# Patient Record
Sex: MALE | Race: WHITE | NOT HISPANIC OR LATINO | URBAN - METROPOLITAN AREA
[De-identification: names, ages, dates, MRNs, and addresses within clinical notes are randomized per-mention and may not be internally consistent; named-entity substitution may affect disease eponyms.]

---

## 2019-09-14 ENCOUNTER — EMERGENCY (EMERGENCY)
Facility: HOSPITAL | Age: 74
LOS: 0 days | Discharge: HOME | End: 2019-09-14
Attending: EMERGENCY MEDICINE
Payer: SELF-PAY

## 2019-09-14 VITALS
RESPIRATION RATE: 18 BRPM | DIASTOLIC BLOOD PRESSURE: 71 MMHG | OXYGEN SATURATION: 97 % | SYSTOLIC BLOOD PRESSURE: 145 MMHG | HEART RATE: 74 BPM | TEMPERATURE: 98 F

## 2019-09-14 VITALS — HEIGHT: 68 IN | WEIGHT: 220.02 LBS

## 2019-09-14 DIAGNOSIS — S80.02XA CONTUSION OF LEFT KNEE, INITIAL ENCOUNTER: ICD-10-CM

## 2019-09-14 DIAGNOSIS — Z88.0 ALLERGY STATUS TO PENICILLIN: ICD-10-CM

## 2019-09-14 DIAGNOSIS — M54.2 CERVICALGIA: ICD-10-CM
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
## 2019-09-14 DIAGNOSIS — I10 ESSENTIAL (PRIMARY) HYPERTENSION: ICD-10-CM

## 2019-09-14 DIAGNOSIS — M25.512 PAIN IN LEFT SHOULDER: ICD-10-CM
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
## 2019-09-14 DIAGNOSIS — S50.02XA CONTUSION OF LEFT ELBOW, INITIAL ENCOUNTER: ICD-10-CM

## 2019-09-14 PROCEDURE — 99284 EMERGENCY DEPT VISIT MOD MDM: CPT | Mod: 25

## 2019-09-14 PROCEDURE — 99283 EMERGENCY DEPT VISIT LOW MDM: CPT

## 2019-09-14 PROCEDURE — 73562 X-RAY EXAM OF KNEE 3: CPT | Mod: LT

## 2019-09-14 PROCEDURE — 73562 X-RAY EXAM OF KNEE 3: CPT | Mod: 26,LT

## 2019-09-14 PROCEDURE — 73080 X-RAY EXAM OF ELBOW: CPT | Mod: LT

## 2019-09-14 PROCEDURE — 73080 X-RAY EXAM OF ELBOW: CPT | Mod: 26,LT

## 2019-09-14 NOTE — ED STATDOCS - PATIENT PORTAL LINK FT
You can access the FollowMyHealth Patient Portal offered by [facility] by registering at the following website: [email or web address]. By joining [email or web address]’s FollowMyHealth portal, you will also be able to view your health information using other applications (apps) compatible with our system.
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
## 2019-09-14 NOTE — ED ADULT TRIAGE NOTE - NS ED TRIAGE CLINICAL UPGRADE
Pain - Patient was clinically upgraded due to pain.
(3) unable to speak (not related to language barrier)

## 2019-09-14 NOTE — ED STATDOCS - CARE PROVIDER_API CALL
Carlos Darby (DO)  Orthopaedic Surgery  [address] [place] [address]  Phone: [phone]  Fax: [phone]  Follow Up Time:
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
## 2020-10-16 NOTE — ED ADULT NURSE NOTE - NSIMPLEMENTINTERV_GEN_ALL_ED
Implemented All Universal Safety Interventions:  [place] to call system. Call bell, personal items and telephone within reach. Instruct patient to call for assistance. Room bathroom lighting operational. Non-slip footwear when patient is off stretcher. Physically safe environment: no spills, clutter or unnecessary equipment. Stretcher in lowest position, wheels locked, appropriate side rails in place.
1

## 2023-08-14 NOTE — ED STATDOCS - PRO INTERPRETER NEED 2
You can access the FollowMyHealth Patient Portal offered by [facility] by registering at the following website: [email or web address]. By joining [employer]’s FollowMyHealth portal, you will also be able to view your health information using other applications (apps) compatible with our system. English

## 2023-12-12 ENCOUNTER — INPATIENT (INPATIENT)
Facility: HOSPITAL | Age: 78
LOS: 3 days | Discharge: HOME | DRG: 441 | End: 2023-12-16
Attending: INTERNAL MEDICINE | Admitting: INTERNAL MEDICINE
Payer: OTHER GOVERNMENT

## 2023-12-12 VITALS
HEART RATE: 74 BPM | SYSTOLIC BLOOD PRESSURE: 109 MMHG | TEMPERATURE: 98 F | DIASTOLIC BLOOD PRESSURE: 45 MMHG | OXYGEN SATURATION: 100 % | RESPIRATION RATE: 18 BRPM

## 2023-12-12 DIAGNOSIS — E72.20 DISORDER OF UREA CYCLE METABOLISM, UNSPECIFIED: ICD-10-CM

## 2023-12-12 PROBLEM — I10 ESSENTIAL (PRIMARY) HYPERTENSION: Chronic | Status: ACTIVE | Noted: 2019-09-14

## 2023-12-12 LAB
ABO RH CONFIRMATION: SIGNIFICANT CHANGE UP
ABO RH CONFIRMATION: SIGNIFICANT CHANGE UP
ALBUMIN SERPL ELPH-MCNC: 2.8 G/DL — LOW (ref 3.3–5)
ALBUMIN SERPL ELPH-MCNC: 2.8 G/DL — LOW (ref 3.3–5)
ALP SERPL-CCNC: 118 U/L — SIGNIFICANT CHANGE UP (ref 40–120)
ALP SERPL-CCNC: 118 U/L — SIGNIFICANT CHANGE UP (ref 40–120)
ALT FLD-CCNC: 23 U/L — SIGNIFICANT CHANGE UP (ref 12–78)
ALT FLD-CCNC: 23 U/L — SIGNIFICANT CHANGE UP (ref 12–78)
AMMONIA BLD-MCNC: 108 UMOL/L — HIGH (ref 11–32)
AMMONIA BLD-MCNC: 108 UMOL/L — HIGH (ref 11–32)
ANION GAP SERPL CALC-SCNC: 8 MMOL/L — SIGNIFICANT CHANGE UP (ref 5–17)
ANION GAP SERPL CALC-SCNC: 8 MMOL/L — SIGNIFICANT CHANGE UP (ref 5–17)
ANISOCYTOSIS BLD QL: SIGNIFICANT CHANGE UP
ANISOCYTOSIS BLD QL: SIGNIFICANT CHANGE UP
APPEARANCE UR: CLEAR — SIGNIFICANT CHANGE UP
APPEARANCE UR: CLEAR — SIGNIFICANT CHANGE UP
APTT BLD: 36.6 SEC — HIGH (ref 24.5–35.6)
APTT BLD: 36.6 SEC — HIGH (ref 24.5–35.6)
AST SERPL-CCNC: 40 U/L — HIGH (ref 15–37)
AST SERPL-CCNC: 40 U/L — HIGH (ref 15–37)
BASOPHILS # BLD AUTO: 0.03 K/UL — SIGNIFICANT CHANGE UP (ref 0–0.2)
BASOPHILS # BLD AUTO: 0.03 K/UL — SIGNIFICANT CHANGE UP (ref 0–0.2)
BASOPHILS NFR BLD AUTO: 0.5 % — SIGNIFICANT CHANGE UP (ref 0–2)
BASOPHILS NFR BLD AUTO: 0.5 % — SIGNIFICANT CHANGE UP (ref 0–2)
BILIRUB SERPL-MCNC: 1.2 MG/DL — SIGNIFICANT CHANGE UP (ref 0.2–1.2)
BILIRUB SERPL-MCNC: 1.2 MG/DL — SIGNIFICANT CHANGE UP (ref 0.2–1.2)
BILIRUB UR-MCNC: NEGATIVE — SIGNIFICANT CHANGE UP
BILIRUB UR-MCNC: NEGATIVE — SIGNIFICANT CHANGE UP
BLD GP AB SCN SERPL QL: SIGNIFICANT CHANGE UP
BLD GP AB SCN SERPL QL: SIGNIFICANT CHANGE UP
BUN SERPL-MCNC: 45 MG/DL — HIGH (ref 7–23)
BUN SERPL-MCNC: 45 MG/DL — HIGH (ref 7–23)
CALCIUM SERPL-MCNC: 8.9 MG/DL — SIGNIFICANT CHANGE UP (ref 8.5–10.1)
CALCIUM SERPL-MCNC: 8.9 MG/DL — SIGNIFICANT CHANGE UP (ref 8.5–10.1)
CHLORIDE SERPL-SCNC: 111 MMOL/L — HIGH (ref 96–108)
CHLORIDE SERPL-SCNC: 111 MMOL/L — HIGH (ref 96–108)
CO2 SERPL-SCNC: 21 MMOL/L — LOW (ref 22–31)
CO2 SERPL-SCNC: 21 MMOL/L — LOW (ref 22–31)
COLOR SPEC: YELLOW — SIGNIFICANT CHANGE UP
COLOR SPEC: YELLOW — SIGNIFICANT CHANGE UP
CREAT SERPL-MCNC: 1.59 MG/DL — HIGH (ref 0.5–1.3)
CREAT SERPL-MCNC: 1.59 MG/DL — HIGH (ref 0.5–1.3)
DIFF PNL FLD: NEGATIVE — SIGNIFICANT CHANGE UP
DIFF PNL FLD: NEGATIVE — SIGNIFICANT CHANGE UP
EGFR: 44 ML/MIN/1.73M2 — LOW
EGFR: 44 ML/MIN/1.73M2 — LOW
EOSINOPHIL # BLD AUTO: 0.36 K/UL — SIGNIFICANT CHANGE UP (ref 0–0.5)
EOSINOPHIL # BLD AUTO: 0.36 K/UL — SIGNIFICANT CHANGE UP (ref 0–0.5)
EOSINOPHIL NFR BLD AUTO: 5.7 % — SIGNIFICANT CHANGE UP (ref 0–6)
EOSINOPHIL NFR BLD AUTO: 5.7 % — SIGNIFICANT CHANGE UP (ref 0–6)
ETHANOL SERPL-MCNC: <10 MG/DL — SIGNIFICANT CHANGE UP (ref 0–10)
ETHANOL SERPL-MCNC: <10 MG/DL — SIGNIFICANT CHANGE UP (ref 0–10)
FLUAV AG NPH QL: SIGNIFICANT CHANGE UP
FLUAV AG NPH QL: SIGNIFICANT CHANGE UP
FLUBV AG NPH QL: SIGNIFICANT CHANGE UP
FLUBV AG NPH QL: SIGNIFICANT CHANGE UP
GLUCOSE SERPL-MCNC: 120 MG/DL — HIGH (ref 70–99)
GLUCOSE SERPL-MCNC: 120 MG/DL — HIGH (ref 70–99)
GLUCOSE UR QL: NEGATIVE MG/DL — SIGNIFICANT CHANGE UP
GLUCOSE UR QL: NEGATIVE MG/DL — SIGNIFICANT CHANGE UP
HCT VFR BLD CALC: 25.3 % — LOW (ref 39–50)
HCT VFR BLD CALC: 25.3 % — LOW (ref 39–50)
HGB BLD-MCNC: 8.5 G/DL — LOW (ref 13–17)
HGB BLD-MCNC: 8.5 G/DL — LOW (ref 13–17)
IMM GRANULOCYTES NFR BLD AUTO: 0.3 % — SIGNIFICANT CHANGE UP (ref 0–0.9)
IMM GRANULOCYTES NFR BLD AUTO: 0.3 % — SIGNIFICANT CHANGE UP (ref 0–0.9)
INR BLD: 1.16 RATIO — SIGNIFICANT CHANGE UP (ref 0.85–1.18)
INR BLD: 1.16 RATIO — SIGNIFICANT CHANGE UP (ref 0.85–1.18)
KETONES UR-MCNC: NEGATIVE MG/DL — SIGNIFICANT CHANGE UP
KETONES UR-MCNC: NEGATIVE MG/DL — SIGNIFICANT CHANGE UP
LACTATE SERPL-SCNC: 2.9 MMOL/L — HIGH (ref 0.7–2)
LACTATE SERPL-SCNC: 2.9 MMOL/L — HIGH (ref 0.7–2)
LACTATE SERPL-SCNC: 3.4 MMOL/L — HIGH (ref 0.7–2)
LACTATE SERPL-SCNC: 3.4 MMOL/L — HIGH (ref 0.7–2)
LEUKOCYTE ESTERASE UR-ACNC: NEGATIVE — SIGNIFICANT CHANGE UP
LEUKOCYTE ESTERASE UR-ACNC: NEGATIVE — SIGNIFICANT CHANGE UP
LYMPHOCYTES # BLD AUTO: 1.89 K/UL — SIGNIFICANT CHANGE UP (ref 1–3.3)
LYMPHOCYTES # BLD AUTO: 1.89 K/UL — SIGNIFICANT CHANGE UP (ref 1–3.3)
LYMPHOCYTES # BLD AUTO: 29.8 % — SIGNIFICANT CHANGE UP (ref 13–44)
LYMPHOCYTES # BLD AUTO: 29.8 % — SIGNIFICANT CHANGE UP (ref 13–44)
MACROCYTES BLD QL: SLIGHT — SIGNIFICANT CHANGE UP
MACROCYTES BLD QL: SLIGHT — SIGNIFICANT CHANGE UP
MANUAL SMEAR VERIFICATION: SIGNIFICANT CHANGE UP
MANUAL SMEAR VERIFICATION: SIGNIFICANT CHANGE UP
MCHC RBC-ENTMCNC: 33.6 GM/DL — SIGNIFICANT CHANGE UP (ref 32–36)
MCHC RBC-ENTMCNC: 33.6 GM/DL — SIGNIFICANT CHANGE UP (ref 32–36)
MCHC RBC-ENTMCNC: 36.3 PG — HIGH (ref 27–34)
MCHC RBC-ENTMCNC: 36.3 PG — HIGH (ref 27–34)
MCV RBC AUTO: 108.1 FL — HIGH (ref 80–100)
MCV RBC AUTO: 108.1 FL — HIGH (ref 80–100)
MONOCYTES # BLD AUTO: 1.11 K/UL — HIGH (ref 0–0.9)
MONOCYTES # BLD AUTO: 1.11 K/UL — HIGH (ref 0–0.9)
MONOCYTES NFR BLD AUTO: 17.5 % — HIGH (ref 2–14)
MONOCYTES NFR BLD AUTO: 17.5 % — HIGH (ref 2–14)
NEUTROPHILS # BLD AUTO: 2.94 K/UL — SIGNIFICANT CHANGE UP (ref 1.8–7.4)
NEUTROPHILS # BLD AUTO: 2.94 K/UL — SIGNIFICANT CHANGE UP (ref 1.8–7.4)
NEUTROPHILS NFR BLD AUTO: 46.2 % — SIGNIFICANT CHANGE UP (ref 43–77)
NEUTROPHILS NFR BLD AUTO: 46.2 % — SIGNIFICANT CHANGE UP (ref 43–77)
NITRITE UR-MCNC: NEGATIVE — SIGNIFICANT CHANGE UP
NITRITE UR-MCNC: NEGATIVE — SIGNIFICANT CHANGE UP
OVALOCYTES BLD QL SMEAR: SLIGHT — SIGNIFICANT CHANGE UP
OVALOCYTES BLD QL SMEAR: SLIGHT — SIGNIFICANT CHANGE UP
PH UR: 7 — SIGNIFICANT CHANGE UP (ref 5–8)
PH UR: 7 — SIGNIFICANT CHANGE UP (ref 5–8)
PLAT MORPH BLD: NORMAL — SIGNIFICANT CHANGE UP
PLAT MORPH BLD: NORMAL — SIGNIFICANT CHANGE UP
PLATELET # BLD AUTO: 162 K/UL — SIGNIFICANT CHANGE UP (ref 150–400)
PLATELET # BLD AUTO: 162 K/UL — SIGNIFICANT CHANGE UP (ref 150–400)
POTASSIUM SERPL-MCNC: 4.5 MMOL/L — SIGNIFICANT CHANGE UP (ref 3.5–5.3)
POTASSIUM SERPL-MCNC: 4.5 MMOL/L — SIGNIFICANT CHANGE UP (ref 3.5–5.3)
POTASSIUM SERPL-SCNC: 4.5 MMOL/L — SIGNIFICANT CHANGE UP (ref 3.5–5.3)
POTASSIUM SERPL-SCNC: 4.5 MMOL/L — SIGNIFICANT CHANGE UP (ref 3.5–5.3)
PROT SERPL-MCNC: 7.1 GM/DL — SIGNIFICANT CHANGE UP (ref 6–8.3)
PROT SERPL-MCNC: 7.1 GM/DL — SIGNIFICANT CHANGE UP (ref 6–8.3)
PROT UR-MCNC: NEGATIVE MG/DL — SIGNIFICANT CHANGE UP
PROT UR-MCNC: NEGATIVE MG/DL — SIGNIFICANT CHANGE UP
PROTHROM AB SERPL-ACNC: 13.1 SEC — HIGH (ref 9.5–13)
PROTHROM AB SERPL-ACNC: 13.1 SEC — HIGH (ref 9.5–13)
RBC # BLD: 2.34 M/UL — LOW (ref 4.2–5.8)
RBC # BLD: 2.34 M/UL — LOW (ref 4.2–5.8)
RBC # FLD: 18 % — HIGH (ref 10.3–14.5)
RBC # FLD: 18 % — HIGH (ref 10.3–14.5)
RBC BLD AUTO: SIGNIFICANT CHANGE UP
RBC BLD AUTO: SIGNIFICANT CHANGE UP
RSV RNA NPH QL NAA+NON-PROBE: SIGNIFICANT CHANGE UP
RSV RNA NPH QL NAA+NON-PROBE: SIGNIFICANT CHANGE UP
SARS-COV-2 RNA SPEC QL NAA+PROBE: SIGNIFICANT CHANGE UP
SARS-COV-2 RNA SPEC QL NAA+PROBE: SIGNIFICANT CHANGE UP
SODIUM SERPL-SCNC: 140 MMOL/L — SIGNIFICANT CHANGE UP (ref 135–145)
SODIUM SERPL-SCNC: 140 MMOL/L — SIGNIFICANT CHANGE UP (ref 135–145)
SP GR SPEC: 1.01 — SIGNIFICANT CHANGE UP (ref 1–1.03)
SP GR SPEC: 1.01 — SIGNIFICANT CHANGE UP (ref 1–1.03)
TROPONIN I, HIGH SENSITIVITY RESULT: 8 NG/L — SIGNIFICANT CHANGE UP
TROPONIN I, HIGH SENSITIVITY RESULT: 8 NG/L — SIGNIFICANT CHANGE UP
UROBILINOGEN FLD QL: 0.2 MG/DL — SIGNIFICANT CHANGE UP (ref 0.2–1)
UROBILINOGEN FLD QL: 0.2 MG/DL — SIGNIFICANT CHANGE UP (ref 0.2–1)
WBC # BLD: 6.35 K/UL — SIGNIFICANT CHANGE UP (ref 3.8–10.5)
WBC # BLD: 6.35 K/UL — SIGNIFICANT CHANGE UP (ref 3.8–10.5)
WBC # FLD AUTO: 6.35 K/UL — SIGNIFICANT CHANGE UP (ref 3.8–10.5)
WBC # FLD AUTO: 6.35 K/UL — SIGNIFICANT CHANGE UP (ref 3.8–10.5)

## 2023-12-12 PROCEDURE — 99222 1ST HOSP IP/OBS MODERATE 55: CPT

## 2023-12-12 PROCEDURE — 85027 COMPLETE CBC AUTOMATED: CPT

## 2023-12-12 PROCEDURE — 70450 CT HEAD/BRAIN W/O DYE: CPT | Mod: 26,MA

## 2023-12-12 PROCEDURE — P9016: CPT

## 2023-12-12 PROCEDURE — 36430 TRANSFUSION BLD/BLD COMPNT: CPT

## 2023-12-12 PROCEDURE — 85025 COMPLETE CBC W/AUTO DIFF WBC: CPT

## 2023-12-12 PROCEDURE — 36415 COLL VENOUS BLD VENIPUNCTURE: CPT

## 2023-12-12 PROCEDURE — 72125 CT NECK SPINE W/O DYE: CPT | Mod: 26,MA

## 2023-12-12 PROCEDURE — 82140 ASSAY OF AMMONIA: CPT

## 2023-12-12 PROCEDURE — 80053 COMPREHEN METABOLIC PANEL: CPT

## 2023-12-12 PROCEDURE — 82962 GLUCOSE BLOOD TEST: CPT

## 2023-12-12 PROCEDURE — C9113: CPT

## 2023-12-12 PROCEDURE — 82607 VITAMIN B-12: CPT

## 2023-12-12 PROCEDURE — 99285 EMERGENCY DEPT VISIT HI MDM: CPT

## 2023-12-12 PROCEDURE — 86920 COMPATIBILITY TEST SPIN: CPT

## 2023-12-12 PROCEDURE — 82746 ASSAY OF FOLIC ACID SERUM: CPT

## 2023-12-12 PROCEDURE — 93010 ELECTROCARDIOGRAM REPORT: CPT

## 2023-12-12 PROCEDURE — P9040: CPT

## 2023-12-12 PROCEDURE — 71260 CT THORAX DX C+: CPT | Mod: 26,MA

## 2023-12-12 PROCEDURE — 71045 X-RAY EXAM CHEST 1 VIEW: CPT | Mod: 26

## 2023-12-12 PROCEDURE — 74177 CT ABD & PELVIS W/CONTRAST: CPT | Mod: 26,MA

## 2023-12-12 PROCEDURE — 80048 BASIC METABOLIC PNL TOTAL CA: CPT

## 2023-12-12 RX ORDER — LACTULOSE 10 G/15ML
200 SOLUTION ORAL ONCE
Refills: 0 | Status: COMPLETED | OUTPATIENT
Start: 2023-12-12 | End: 2023-12-12

## 2023-12-12 RX ORDER — AMLODIPINE BESYLATE 2.5 MG/1
10 TABLET ORAL DAILY
Refills: 0 | Status: DISCONTINUED | OUTPATIENT
Start: 2023-12-12 | End: 2023-12-16

## 2023-12-12 RX ORDER — FUROSEMIDE 40 MG
40 TABLET ORAL DAILY
Refills: 0 | Status: DISCONTINUED | OUTPATIENT
Start: 2023-12-12 | End: 2023-12-13

## 2023-12-12 RX ORDER — LACTULOSE 10 G/15ML
15 SOLUTION ORAL ONCE
Refills: 0 | Status: DISCONTINUED | OUTPATIENT
Start: 2023-12-12 | End: 2023-12-12

## 2023-12-12 RX ORDER — CEFTRIAXONE 500 MG/1
1000 INJECTION, POWDER, FOR SOLUTION INTRAMUSCULAR; INTRAVENOUS ONCE
Refills: 0 | Status: COMPLETED | OUTPATIENT
Start: 2023-12-12 | End: 2023-12-12

## 2023-12-12 RX ORDER — CEFTRIAXONE 500 MG/1
1000 INJECTION, POWDER, FOR SOLUTION INTRAMUSCULAR; INTRAVENOUS ONCE
Refills: 0 | Status: DISCONTINUED | OUTPATIENT
Start: 2023-12-12 | End: 2023-12-12

## 2023-12-12 RX ORDER — SPIRONOLACTONE 25 MG/1
100 TABLET, FILM COATED ORAL DAILY
Refills: 0 | Status: DISCONTINUED | OUTPATIENT
Start: 2023-12-12 | End: 2023-12-14

## 2023-12-12 RX ORDER — KETOROLAC TROMETHAMINE 0.5 %
1 DROPS OPHTHALMIC (EYE) THREE TIMES A DAY
Refills: 0 | Status: DISCONTINUED | OUTPATIENT
Start: 2023-12-12 | End: 2023-12-16

## 2023-12-12 RX ORDER — MONTELUKAST 4 MG/1
1 TABLET, CHEWABLE ORAL
Refills: 0 | DISCHARGE

## 2023-12-12 RX ORDER — KETOROLAC TROMETHAMINE 0.5 %
1 DROPS OPHTHALMIC (EYE)
Refills: 0 | DISCHARGE

## 2023-12-12 RX ORDER — MONTELUKAST 4 MG/1
10 TABLET, CHEWABLE ORAL AT BEDTIME
Refills: 0 | Status: DISCONTINUED | OUTPATIENT
Start: 2023-12-12 | End: 2023-12-16

## 2023-12-12 RX ORDER — AMLODIPINE BESYLATE 2.5 MG/1
1 TABLET ORAL
Refills: 0 | DISCHARGE

## 2023-12-12 RX ORDER — FOLIC ACID 0.8 MG
1 TABLET ORAL
Refills: 0 | DISCHARGE

## 2023-12-12 RX ORDER — ACETAMINOPHEN 500 MG
650 TABLET ORAL EVERY 6 HOURS
Refills: 0 | Status: DISCONTINUED | OUTPATIENT
Start: 2023-12-12 | End: 2023-12-16

## 2023-12-12 RX ORDER — LANOLIN ALCOHOL/MO/W.PET/CERES
3 CREAM (GRAM) TOPICAL AT BEDTIME
Refills: 0 | Status: DISCONTINUED | OUTPATIENT
Start: 2023-12-12 | End: 2023-12-16

## 2023-12-12 RX ORDER — FERROUS SULFATE 325(65) MG
325 TABLET ORAL DAILY
Refills: 0 | Status: DISCONTINUED | OUTPATIENT
Start: 2023-12-12 | End: 2023-12-16

## 2023-12-12 RX ORDER — PANTOPRAZOLE SODIUM 20 MG/1
40 TABLET, DELAYED RELEASE ORAL
Refills: 0 | Status: DISCONTINUED | OUTPATIENT
Start: 2023-12-12 | End: 2023-12-13

## 2023-12-12 RX ORDER — PANTOPRAZOLE SODIUM 20 MG/1
80 TABLET, DELAYED RELEASE ORAL ONCE
Refills: 0 | Status: COMPLETED | OUTPATIENT
Start: 2023-12-12 | End: 2023-12-12

## 2023-12-12 RX ORDER — LORATADINE 10 MG/1
1 TABLET ORAL
Refills: 0 | DISCHARGE

## 2023-12-12 RX ORDER — RIFAXIMIN 200 MG/1
1 TABLET ORAL
Refills: 0 | DISCHARGE

## 2023-12-12 RX ORDER — PANTOPRAZOLE SODIUM 20 MG/1
1 TABLET, DELAYED RELEASE ORAL
Refills: 0 | DISCHARGE

## 2023-12-12 RX ORDER — ONDANSETRON 8 MG/1
4 TABLET, FILM COATED ORAL ONCE
Refills: 0 | Status: COMPLETED | OUTPATIENT
Start: 2023-12-12 | End: 2023-12-12

## 2023-12-12 RX ORDER — PREDNISOLONE SODIUM PHOSPHATE 1 %
1 DROPS OPHTHALMIC (EYE) THREE TIMES A DAY
Refills: 0 | Status: DISCONTINUED | OUTPATIENT
Start: 2023-12-12 | End: 2023-12-16

## 2023-12-12 RX ORDER — PREDNISOLONE SODIUM PHOSPHATE 1 %
1 DROPS OPHTHALMIC (EYE)
Refills: 0 | DISCHARGE

## 2023-12-12 RX ORDER — FOLIC ACID 0.8 MG
1 TABLET ORAL DAILY
Refills: 0 | Status: DISCONTINUED | OUTPATIENT
Start: 2023-12-12 | End: 2023-12-16

## 2023-12-12 RX ORDER — LACTULOSE 10 G/15ML
10 SOLUTION ORAL
Refills: 0 | DISCHARGE

## 2023-12-12 RX ORDER — PANTOPRAZOLE SODIUM 20 MG/1
8 TABLET, DELAYED RELEASE ORAL
Qty: 80 | Refills: 0 | Status: DISCONTINUED | OUTPATIENT
Start: 2023-12-12 | End: 2023-12-12

## 2023-12-12 RX ORDER — ASCORBIC ACID 60 MG
500 TABLET,CHEWABLE ORAL DAILY
Refills: 0 | Status: DISCONTINUED | OUTPATIENT
Start: 2023-12-12 | End: 2023-12-16

## 2023-12-12 RX ORDER — ASCORBIC ACID 60 MG
1 TABLET,CHEWABLE ORAL
Refills: 0 | DISCHARGE

## 2023-12-12 RX ORDER — ONDANSETRON 8 MG/1
4 TABLET, FILM COATED ORAL EVERY 6 HOURS
Refills: 0 | Status: DISCONTINUED | OUTPATIENT
Start: 2023-12-12 | End: 2023-12-16

## 2023-12-12 RX ORDER — SODIUM CHLORIDE 9 MG/ML
1000 INJECTION INTRAMUSCULAR; INTRAVENOUS; SUBCUTANEOUS ONCE
Refills: 0 | Status: COMPLETED | OUTPATIENT
Start: 2023-12-12 | End: 2023-12-12

## 2023-12-12 RX ADMIN — PANTOPRAZOLE SODIUM 80 MILLIGRAM(S): 20 TABLET, DELAYED RELEASE ORAL at 11:40

## 2023-12-12 RX ADMIN — Medication 1 MILLIGRAM(S): at 15:35

## 2023-12-12 RX ADMIN — PANTOPRAZOLE SODIUM 10 MG/HR: 20 TABLET, DELAYED RELEASE ORAL at 12:41

## 2023-12-12 RX ADMIN — ONDANSETRON 4 MILLIGRAM(S): 8 TABLET, FILM COATED ORAL at 11:35

## 2023-12-12 RX ADMIN — CEFTRIAXONE 1000 MILLIGRAM(S): 500 INJECTION, POWDER, FOR SOLUTION INTRAMUSCULAR; INTRAVENOUS at 12:48

## 2023-12-12 RX ADMIN — LACTULOSE 200 GRAM(S): 10 SOLUTION ORAL at 16:13

## 2023-12-12 RX ADMIN — SODIUM CHLORIDE 1000 MILLILITER(S): 9 INJECTION INTRAMUSCULAR; INTRAVENOUS; SUBCUTANEOUS at 12:15

## 2023-12-12 NOTE — ED PROVIDER NOTE - CARE PLAN
Principal Discharge DX:	Hyperammonemia  Secondary Diagnosis:	Hepatic encephalopathy  Secondary Diagnosis:	GI bleed   1
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
## 2023-12-12 NOTE — H&P ADULT - ATTENDING COMMENTS
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     -s/p lactulose with several loose BMs, can hold for now and cont prn with goal 3 BM daily, cont home meds including lasix and aldactone, NPO until improvement in mental status with FS q6 and cautious observation of bmp as unknown baseline kidney function, if worsening will hold diuretics. Restart rifaximin, ativan prn, 1:1 constant observation, GI consult as patient had episode of likely hemoptysis in coughing fit however due to mental status cannot rule out hematemesis, cannot rule out varices and may need screening EGD

## 2023-12-12 NOTE — H&P ADULT - ASSESSMENT
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 in [place], chronic anemia and HTN who was brought in by his daughter for worsening mentation being admitted for:    #Hepatic encephalopathy 2/2 to decompensated liver cirrhosis  - Agent Orange exposure in [place], [facility]  - decompensation 2/2 to not taking Rifaximin since 12/9/23 (patient ran out of meds)  - s/p 1x IV Rocephin in ED   c/w rifaximin 550mg BID  - will start Lactulose BID and titrate to 2-3 BM per day   - underwent Lactulose enema in ED with large BM  - Lactate 3.4 likely falsely elevated 2/2 to liver cirrhosis no clinical suspicion of sepsis and no GI bleed confirmed with CT Angio  - Maintain 1:1 for agitation  - IV Ativan for PRN agitation   - GI consult     #Hemoptysis   - unlikely hematemesis  - last EGD in Sep 2023 notable only for angiectasia no varices seen [facility]  - will make NPO at midnight for potential repeat EGD in AM   - hold AC  - IV PPi BID    #Macrocytic Anemia  - baseline Hb ~9; currently 8.5  - daily CBC  - daily folate and Fe   - hold AC  - transfuse PRN Hb <7.0    #Renal Failure  - Cr 1.59/GFR 44  - received 1L bolus in ED  - unsure of baseline; daily BMP    #RUQ Mass  - 2.0 x1.6cm mas in RUQ mesentery  - outpatient follow up    #VTE PPx  - holding in light of bleed and chronic anemia

## 2023-12-12 NOTE — H&P ADULT - NSHPPHYSICALEXAM_GEN_ALL_CORE
Constitutional: Pt lying in bed, agitated  Neck: Soft and supple, no JVD  Respiratory: CTABL, No wheezing, rales or rhonchi  Cardiovascular: S1S2+, RRR, no M/G/R  Gastrointestinal: BS+, soft, NT/ND, no guarding, no rebound  Extremities: No peripheral edema  Vascular: 2+ peripheral pulses  Neurological: AAOx0  Skin: No rashes +b/l gynecomastia

## 2023-12-12 NOTE — H&P ADULT - NSHPSOCIALHISTORY_GEN_ALL_CORE
Patient lives at home with his wife who has dementia and daughter. He is independent in ADLs/IADLs. Former social drinker, stopped dirnking March 2022 after being diagnosed with cirrhosis. No tobacco or illicit drug use.

## 2023-12-12 NOTE — ED PROVIDER NOTE - PROGRESS NOTE DETAILS
Cristobal Hirsch for attending Dr. Olivier: Rectal Exam: Lot [id number]. Expiration 4/30/24. Brown stool. Guaiac positive. QC passed. Cristobal Hirsch for attending Dr. Olivier: Discussed with daughter. Pt is a  [facility] Pt with hx of HTN, anemia, last hemoglobin 9, and liver cirrhosis on Rifaximin but has been out for 2 days. Pt had a normal day yesterday. This morning, daughter went to wake him up at 9. Pt was in bed still around 10am, was confused and daughter called EMS. Last EtOH use March 2022. d[facility]. no hx of varices. last scope was September of 2023, cauterized several angioectasial bleeds.  Stable since. [facility] unable [facility]. D/W dr mares for admit. ok with med'surg for hepatic encephalopathy,GI Bleed hgB stable at 8.5 (last in Septmeber 2023 9.1).  elevated lactate result of liver failure and likely not sepsis or ischemic bowel (given ct angio abd neg ).  MD FREDIS

## 2023-12-12 NOTE — ED ADULT NURSE NOTE - NSFALLHARMRISKINTERV_ED_ALL_ED
Communicate risk of Fall with Harm to all staff, patient, and family/Provide visual cue: red socks, yellow wristband, yellow gown, etc/Reinforce activity limits and safety measures with patient and family/Bed in lowest position, wheels locked, appropriate side rails in place/Call bell, personal items and telephone in reach/Instruct patient to call for assistance before getting out of bed/chair/stretcher/Non-slip footwear applied when patient is off stretcher/[place] to call system/Physically safe environment - no spills, clutter or unnecessary equipment/Purposeful Proactive Rounding/Room/bathroom lighting operational, light cord in reach Communicate risk of Fall with Harm to all staff, patient, and family/Provide visual cue: red socks, yellow wristband, yellow gown, etc/Reinforce activity limits and safety measures with patient and family/Bed in lowest position, wheels locked, appropriate side rails in place/Call bell, personal items and telephone in reach/Instruct patient to call for assistance before getting out of bed/chair/stretcher/Non-slip footwear applied when patient is off stretcher/[place] to call system/Physically safe environment - no spills, clutter or unnecessary equipment/Purposeful Proactive Rounding/Room/bathroom lighting operational, light cord in reach
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
## 2023-12-12 NOTE — ED PROVIDER NOTE - PHYSICAL EXAMINATION
Constitutional: chronically ill appearing, pale with blood around mouth, AOx3  Eyes: PERRL EOMI  Head: Normocephalic atraumatic  Mouth: MMM  Cardiac: regular rate and rhythm  Resp: Lungs CTAB  GI: Abd s/nt. abd mildly distended  Neuro: CN2-12 grossly intact, SNOW x 4  Skin: No visible rashes

## 2023-12-12 NOTE — ED PROVIDER NOTE - CLINICAL SUMMARY MEDICAL DECISION MAKING FREE TEXT BOX
Pt here for AMS with hemoptysis vs hematemesis. Pt with hx of liver disease. Will get PAN CT , Protonix for GI bleed, labs including ammonia and EtOH level, admit.

## 2023-12-12 NOTE — ED ADULT TRIAGE NOTE - CHIEF COMPLAINT QUOTE
BIBA with AMS since last night around 9pm. Patient has a history of cirrhosis and hepatic encephalopathy. Allergy to PCN. In triage Pt started coughing up blood clots. Taken to trauma room, Dr Olivier at bedside

## 2023-12-12 NOTE — PHARMACOTHERAPY INTERVENTION NOTE - COMMENTS
Medication history complete, reviewed and confirmed medication with patients daughter Nano at [phone].  [facility] and did not take any medication yet today. Medication history complete, reviewed and confirmed medication with patients daughter Nano at [phone].  [facility] and did not take any medication yet today.

## 2023-12-12 NOTE — ED ADULT NURSE NOTE - OBJECTIVE STATEMENT
BIBA with AMS since last night around 9pm. Patient has a history of cirrhosis and hepatic encephalopathy. Allergy to PCN. In triage Pt started coughing up blood clots. Taken to trauma room, Dr Olivier at bedside. As per daughter pt was hard to arouse with ams so she called 911. PIV obtained, labs sent, pt on a cardiac monitor, BGM obtained, daughter bedside, plan of care explained.
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
## 2023-12-13 LAB
ALBUMIN SERPL ELPH-MCNC: 2.8 G/DL — LOW (ref 3.3–5)
ALBUMIN SERPL ELPH-MCNC: 2.8 G/DL — LOW (ref 3.3–5)
ALP SERPL-CCNC: 95 U/L — SIGNIFICANT CHANGE UP (ref 40–120)
ALP SERPL-CCNC: 95 U/L — SIGNIFICANT CHANGE UP (ref 40–120)
ALT FLD-CCNC: 21 U/L — SIGNIFICANT CHANGE UP (ref 12–78)
ALT FLD-CCNC: 21 U/L — SIGNIFICANT CHANGE UP (ref 12–78)
AMMONIA BLD-MCNC: 65 UMOL/L — HIGH (ref 11–32)
AMMONIA BLD-MCNC: 65 UMOL/L — HIGH (ref 11–32)
ANION GAP SERPL CALC-SCNC: 9 MMOL/L — SIGNIFICANT CHANGE UP (ref 5–17)
ANION GAP SERPL CALC-SCNC: 9 MMOL/L — SIGNIFICANT CHANGE UP (ref 5–17)
AST SERPL-CCNC: 37 U/L — SIGNIFICANT CHANGE UP (ref 15–37)
AST SERPL-CCNC: 37 U/L — SIGNIFICANT CHANGE UP (ref 15–37)
BASOPHILS # BLD AUTO: 0.04 K/UL — SIGNIFICANT CHANGE UP (ref 0–0.2)
BASOPHILS # BLD AUTO: 0.04 K/UL — SIGNIFICANT CHANGE UP (ref 0–0.2)
BASOPHILS NFR BLD AUTO: 0.7 % — SIGNIFICANT CHANGE UP (ref 0–2)
BASOPHILS NFR BLD AUTO: 0.7 % — SIGNIFICANT CHANGE UP (ref 0–2)
BILIRUB SERPL-MCNC: 1.1 MG/DL — SIGNIFICANT CHANGE UP (ref 0.2–1.2)
BILIRUB SERPL-MCNC: 1.1 MG/DL — SIGNIFICANT CHANGE UP (ref 0.2–1.2)
BUN SERPL-MCNC: 53 MG/DL — HIGH (ref 7–23)
BUN SERPL-MCNC: 53 MG/DL — HIGH (ref 7–23)
CALCIUM SERPL-MCNC: 9.3 MG/DL — SIGNIFICANT CHANGE UP (ref 8.5–10.1)
CALCIUM SERPL-MCNC: 9.3 MG/DL — SIGNIFICANT CHANGE UP (ref 8.5–10.1)
CHLORIDE SERPL-SCNC: 122 MMOL/L — HIGH (ref 96–108)
CHLORIDE SERPL-SCNC: 122 MMOL/L — HIGH (ref 96–108)
CO2 SERPL-SCNC: 18 MMOL/L — LOW (ref 22–31)
CO2 SERPL-SCNC: 18 MMOL/L — LOW (ref 22–31)
CREAT SERPL-MCNC: 1.79 MG/DL — HIGH (ref 0.5–1.3)
CREAT SERPL-MCNC: 1.79 MG/DL — HIGH (ref 0.5–1.3)
CULTURE RESULTS: SIGNIFICANT CHANGE UP
CULTURE RESULTS: SIGNIFICANT CHANGE UP
EGFR: 38 ML/MIN/1.73M2 — LOW
EGFR: 38 ML/MIN/1.73M2 — LOW
EOSINOPHIL # BLD AUTO: 0.22 K/UL — SIGNIFICANT CHANGE UP (ref 0–0.5)
EOSINOPHIL # BLD AUTO: 0.22 K/UL — SIGNIFICANT CHANGE UP (ref 0–0.5)
EOSINOPHIL NFR BLD AUTO: 3.7 % — SIGNIFICANT CHANGE UP (ref 0–6)
EOSINOPHIL NFR BLD AUTO: 3.7 % — SIGNIFICANT CHANGE UP (ref 0–6)
GLUCOSE SERPL-MCNC: 144 MG/DL — HIGH (ref 70–99)
GLUCOSE SERPL-MCNC: 144 MG/DL — HIGH (ref 70–99)
HCT VFR BLD CALC: 24.6 % — LOW (ref 39–50)
HCT VFR BLD CALC: 24.6 % — LOW (ref 39–50)
HGB BLD-MCNC: 8 G/DL — LOW (ref 13–17)
HGB BLD-MCNC: 8 G/DL — LOW (ref 13–17)
IMM GRANULOCYTES NFR BLD AUTO: 0.5 % — SIGNIFICANT CHANGE UP (ref 0–0.9)
IMM GRANULOCYTES NFR BLD AUTO: 0.5 % — SIGNIFICANT CHANGE UP (ref 0–0.9)
LYMPHOCYTES # BLD AUTO: 1.02 K/UL — SIGNIFICANT CHANGE UP (ref 1–3.3)
LYMPHOCYTES # BLD AUTO: 1.02 K/UL — SIGNIFICANT CHANGE UP (ref 1–3.3)
LYMPHOCYTES # BLD AUTO: 17.1 % — SIGNIFICANT CHANGE UP (ref 13–44)
LYMPHOCYTES # BLD AUTO: 17.1 % — SIGNIFICANT CHANGE UP (ref 13–44)
MCHC RBC-ENTMCNC: 32.5 GM/DL — SIGNIFICANT CHANGE UP (ref 32–36)
MCHC RBC-ENTMCNC: 32.5 GM/DL — SIGNIFICANT CHANGE UP (ref 32–36)
MCHC RBC-ENTMCNC: 36.2 PG — HIGH (ref 27–34)
MCHC RBC-ENTMCNC: 36.2 PG — HIGH (ref 27–34)
MCV RBC AUTO: 111.3 FL — HIGH (ref 80–100)
MCV RBC AUTO: 111.3 FL — HIGH (ref 80–100)
MONOCYTES # BLD AUTO: 0.87 K/UL — SIGNIFICANT CHANGE UP (ref 0–0.9)
MONOCYTES # BLD AUTO: 0.87 K/UL — SIGNIFICANT CHANGE UP (ref 0–0.9)
MONOCYTES NFR BLD AUTO: 14.6 % — HIGH (ref 2–14)
MONOCYTES NFR BLD AUTO: 14.6 % — HIGH (ref 2–14)
NEUTROPHILS # BLD AUTO: 3.78 K/UL — SIGNIFICANT CHANGE UP (ref 1.8–7.4)
NEUTROPHILS # BLD AUTO: 3.78 K/UL — SIGNIFICANT CHANGE UP (ref 1.8–7.4)
NEUTROPHILS NFR BLD AUTO: 63.4 % — SIGNIFICANT CHANGE UP (ref 43–77)
NEUTROPHILS NFR BLD AUTO: 63.4 % — SIGNIFICANT CHANGE UP (ref 43–77)
PLATELET # BLD AUTO: 143 K/UL — LOW (ref 150–400)
PLATELET # BLD AUTO: 143 K/UL — LOW (ref 150–400)
POTASSIUM SERPL-MCNC: 4.2 MMOL/L — SIGNIFICANT CHANGE UP (ref 3.5–5.3)
POTASSIUM SERPL-MCNC: 4.2 MMOL/L — SIGNIFICANT CHANGE UP (ref 3.5–5.3)
POTASSIUM SERPL-SCNC: 4.2 MMOL/L — SIGNIFICANT CHANGE UP (ref 3.5–5.3)
POTASSIUM SERPL-SCNC: 4.2 MMOL/L — SIGNIFICANT CHANGE UP (ref 3.5–5.3)
PROT SERPL-MCNC: 6.8 GM/DL — SIGNIFICANT CHANGE UP (ref 6–8.3)
PROT SERPL-MCNC: 6.8 GM/DL — SIGNIFICANT CHANGE UP (ref 6–8.3)
RBC # BLD: 2.21 M/UL — LOW (ref 4.2–5.8)
RBC # BLD: 2.21 M/UL — LOW (ref 4.2–5.8)
RBC # FLD: 18.4 % — HIGH (ref 10.3–14.5)
RBC # FLD: 18.4 % — HIGH (ref 10.3–14.5)
SODIUM SERPL-SCNC: 149 MMOL/L — HIGH (ref 135–145)
SODIUM SERPL-SCNC: 149 MMOL/L — HIGH (ref 135–145)
SPECIMEN SOURCE: SIGNIFICANT CHANGE UP
SPECIMEN SOURCE: SIGNIFICANT CHANGE UP
WBC # BLD: 5.96 K/UL — SIGNIFICANT CHANGE UP (ref 3.8–10.5)
WBC # BLD: 5.96 K/UL — SIGNIFICANT CHANGE UP (ref 3.8–10.5)
WBC # FLD AUTO: 5.96 K/UL — SIGNIFICANT CHANGE UP (ref 3.8–10.5)
WBC # FLD AUTO: 5.96 K/UL — SIGNIFICANT CHANGE UP (ref 3.8–10.5)

## 2023-12-13 PROCEDURE — 99232 SBSQ HOSP IP/OBS MODERATE 35: CPT

## 2023-12-13 RX ORDER — PANTOPRAZOLE SODIUM 20 MG/1
40 TABLET, DELAYED RELEASE ORAL
Refills: 0 | Status: DISCONTINUED | OUTPATIENT
Start: 2023-12-13 | End: 2023-12-14

## 2023-12-13 RX ORDER — LACTULOSE 10 G/15ML
20 SOLUTION ORAL
Refills: 0 | Status: DISCONTINUED | OUTPATIENT
Start: 2023-12-13 | End: 2023-12-16

## 2023-12-13 RX ORDER — LACTULOSE 10 G/15ML
200 SOLUTION ORAL ONCE
Refills: 0 | Status: COMPLETED | OUTPATIENT
Start: 2023-12-13 | End: 2023-12-13

## 2023-12-13 RX ORDER — SODIUM CHLORIDE 9 MG/ML
1000 INJECTION INTRAMUSCULAR; INTRAVENOUS; SUBCUTANEOUS
Refills: 0 | Status: DISCONTINUED | OUTPATIENT
Start: 2023-12-13 | End: 2023-12-16

## 2023-12-13 RX ADMIN — SPIRONOLACTONE 100 MILLIGRAM(S): 25 TABLET, FILM COATED ORAL at 10:35

## 2023-12-13 RX ADMIN — PANTOPRAZOLE SODIUM 40 MILLIGRAM(S): 20 TABLET, DELAYED RELEASE ORAL at 10:38

## 2023-12-13 RX ADMIN — Medication 325 MILLIGRAM(S): at 10:37

## 2023-12-13 RX ADMIN — LACTULOSE 20 GRAM(S): 10 SOLUTION ORAL at 12:48

## 2023-12-13 RX ADMIN — Medication 500 MILLIGRAM(S): at 10:37

## 2023-12-13 RX ADMIN — Medication 40 MILLIGRAM(S): at 10:37

## 2023-12-13 RX ADMIN — Medication 1 DROP(S): at 16:00

## 2023-12-13 RX ADMIN — SODIUM CHLORIDE 83 MILLILITER(S): 9 INJECTION INTRAMUSCULAR; INTRAVENOUS; SUBCUTANEOUS at 12:48

## 2023-12-13 RX ADMIN — PANTOPRAZOLE SODIUM 40 MILLIGRAM(S): 20 TABLET, DELAYED RELEASE ORAL at 20:21

## 2023-12-13 RX ADMIN — Medication 1 DROP(S): at 06:12

## 2023-12-13 RX ADMIN — MONTELUKAST 10 MILLIGRAM(S): 4 TABLET, CHEWABLE ORAL at 20:21

## 2023-12-13 RX ADMIN — Medication 3 MILLIGRAM(S): at 20:21

## 2023-12-13 RX ADMIN — PANTOPRAZOLE SODIUM 40 MILLIGRAM(S): 20 TABLET, DELAYED RELEASE ORAL at 01:59

## 2023-12-13 RX ADMIN — AMLODIPINE BESYLATE 10 MILLIGRAM(S): 2.5 TABLET ORAL at 10:37

## 2023-12-13 RX ADMIN — Medication 1 DROP(S): at 06:11

## 2023-12-13 RX ADMIN — Medication 1 MILLIGRAM(S): at 10:37

## 2023-12-13 RX ADMIN — Medication 1 DROP(S): at 20:19

## 2023-12-13 RX ADMIN — Medication 1 DROP(S): at 20:20

## 2023-12-13 RX ADMIN — Medication 1 DROP(S): at 16:01

## 2023-12-13 NOTE — CONSULT NOTE ADULT - ASSESSMENT
1. Hepatic encephalopathy  2. Cirrhosis  3. Macrocytic anemia. Do not suspect brisk variceal bleed as no evidence of PH on CT and recent EGD unremarkable  4. Hypernatremia  6. 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 Continue Rifaximin 550 mg BID and lactulose 20 mg BID with goal 3-4 bowel movements daily  2. Check B12 and folate  3. Advance diet as tolerated   4. Monitor for overt bleeding and tranfuse for hgb < 7  5. Continue with diuretics and hold if acute bleeding occurs  6. Consider MRI abdomen for further evaluation of mesentary lesion

## 2023-12-13 NOTE — CONSULT NOTE ADULT - SUBJECTIVE AND OBJECTIVE BOX
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 Patient poor historian so information gathered from charting.  Per chart, daughter reports patient at baseline is fully independent and does all ADLs/IADLs however this morning she could not wake him unless she violently jerked him. She states patient normally takes Rifaximin BID however he ran out of medication [facility]. Patient last took Rifaximin on Saturday 12/9/23. In the ambulance bay patient coughed up blood which daughter states has never occurred before. [facility] was in Sep 2023 and notable only for several angioectasias s/p APC.     Currently hemodynamically stable and afebrile. Labs notable for Hgb 8, , plat 143, Na 149, Cr 1.79, normal LFTs. CT with evidence of cirrhosis without portal hypertension,  2 cm indeterminate mass within the right upper quadrant small bowel mesentery and moderate stool burden.       PAST MEDICAL & SURGICAL HISTORY:  HTN (hypertension)      Hepatic encephalopathy          Home Medications:  amLODIPine 10 mg oral tablet: 1 tab(s) orally once a day (12 Dec 2023 16:39)  ascorbic acid 500 mg oral tablet: 1 tab(s) orally once a day (12 Dec 2023 16:38)  folic acid 1 mg oral tablet: 1 tab(s) orally once a day (12 Dec 2023 16:38)  furosemide 40 mg oral tablet: 1 tab(s) orally once a day (12 Dec 2023 16:38)  ketorolac 0.5% ophthalmic solution: 1 drop(s) in the left eye 3 times a day ***pt was supposed to go to eye [facility] on Thursday- just had surgery*** (12 Dec 2023 16:39)  lactulose 10 g/15 mL oral and rectal liquid: 10 gram(s) orally once a day (12 Dec 2023 16:40)  loratadine 10 mg oral tablet: 1 tab(s) orally once a day (12 Dec 2023 16:38)  montelukast 10 mg oral tablet: 1 tab(s) orally once a day (12 Dec 2023 16:38)  Multiple Vitamins oral tablet: 1 tab(s) orally 2 times a day (12 Dec 2023 16:39)  pantoprazole 40 mg oral delayed release tablet: 1 tab(s) orally 2 times a day (12 Dec 2023 16:38)  prednisoLONE acetate 1% ophthalmic suspension: 1 drop(s) in the left eye 3 times a day ***pt was supposed to go to eye [facility] on Thursday- just had surgery*** (12 Dec 2023 16:39)  rifAXIMin 550 mg oral tablet: 1 tab(s) orally 2 times a day (12 Dec 2023 16:39)  spironolactone 100 mg oral tablet: 1 tab(s) orally once a day (12 Dec 2023 16:39)      MEDICATIONS  (STANDING):  amLODIPine   Tablet 10 milliGRAM(s) Oral daily  ascorbic acid 500 milliGRAM(s) Oral daily  ferrous    sulfate 325 milliGRAM(s) Oral daily  folic acid 1 milliGRAM(s) Oral daily  furosemide    Tablet 40 milliGRAM(s) Oral daily  ketorolac 0.5% Ophthalmic Solution 1 Drop(s) Both EYES three times a day  lactulose Syrup 20 Gram(s) Oral two times a day  LORazepam   Injectable 1 milliGRAM(s) IV Push once  montelukast 10 milliGRAM(s) Oral at bedtime  pantoprazole  Injectable 40 milliGRAM(s) IV Push two times a day  prednisoLONE acetate 1% Suspension 1 Drop(s) Both EYES three times a day  rifAXIMin 550 milliGRAM(s) Oral two times a day  sodium chloride 0.9%. 1000 milliLiter(s) (83 mL/Hr) IV Continuous <Continuous>  spironolactone 100 milliGRAM(s) Oral daily    MEDICATIONS  (PRN):  acetaminophen     Tablet .. 650 milliGRAM(s) Oral every 6 hours PRN Mild Pain (1 - 3)  aluminum hydroxide/magnesium hydroxide/simethicone Suspension 30 milliLiter(s) Oral every 4 hours PRN Dyspepsia  LORazepam   Injectable 1 milliGRAM(s) IV Push once PRN Agitation  melatonin 3 milliGRAM(s) Oral at bedtime PRN Insomnia  ondansetron Injectable 4 milliGRAM(s) IV Push every 6 hours PRN Nausea and/or Vomiting      Allergies    penicillin (Unknown)  Mushrooms (Unknown)    Intolerances        SOCIAL HISTORY:    FAMILY HISTORY:      ROS  As above  Otherwise unremarkable    Vital Signs Last 24 Hrs  T(C): 36.9 (13 Dec 2023 08:38), Max: 36.9 (12 Dec 2023 15:00)  T(F): 98.5 (13 Dec 2023 08:38), Max: 98.5 (13 Dec 2023 02:23)  HR: 98 (13 Dec 2023 08:38) (73 - 98)  BP: 127/68 (13 Dec 2023 08:38) (111/48 - 147/68)  BP(mean): --  RR: 19 (13 Dec 2023 08:38) (16 - 20)  SpO2: 97% (13 Dec 2023 08:38) (97% - 100%)    Parameters below as of 13 Dec 2023 08:38  Patient On (Oxygen Delivery Method): room air        Constitutional: NAD, well-developed  Respiratory: CTAB  Cardiovascular: S1 and S2, RRR  Gastrointestinal: BS+, soft, NT/ND  Extremities: No peripheral edema  Psychiatric: Normal mood, normal affect, AAO x 2, asterixis    LABS:                        8.0    5.96  )-----------( 143      ( 13 Dec 2023 10:02 )             24.6     12-13    149<H>  |  122<H>  |  53<H>  ----------------------------<  144<H>  4.2   |  18<L>  |  1.79<H>    Ca    9.3      13 Dec 2023 10:02    TPro  6.8  /  Alb  2.8<L>  /  TBili  1.1  /  DBili  x   /  AST  37  /  ALT  21  /  AlkPhos  95  12-13    PT/INR - ( 12 Dec 2023 11:10 )   PT: 13.1 sec;   INR: 1.16 ratio         PTT - ( 12 Dec 2023 11:10 )  PTT:36.6 sec  LIVER FUNCTIONS - ( 13 Dec 2023 10:02 )  Alb: 2.8 g/dL / Pro: 6.8 gm/dL / ALK PHOS: 95 U/L / ALT: 21 U/L / AST: 37 U/L / GGT: x             RADIOLOGY & ADDITIONAL STUDIES:    ACC: [id number] EXAM:  CT ABDOMEN AND PELVIS IC   ORDERED BY: LEIA JIM     ACC: [id number] EXAM:  CT CHEST IC   ORDERED BY: LEIA JIM     PROCEDURE DATE:  12/12/2023          INTERPRETATION:  CLINICAL INFORMATION: Altered mental status. Coughing or   vomiting up blood.    COMPARISON: None.    CONTRAST/COMPLICATIONS:  IV Contrast: Omnipaque 350 (accession [id number]), IV contrast documented   in unlinked concurrent exam (accession [id number])  90 cc administered   10   cc discarded  Oral Contrast: NONE  Complications: None reported at time of study completion    PROCEDURE:  CT of the Chest, Abdomen and Pelvis was performed.  Sagittal and coronal reformats were performed.    FINDINGS:  CHEST:  LUNGS AND LARGE AIRWAYS: Patent central airways. No lung consolidation.   Few tiny bilateral pulmonary nodules measuring up to 3 mm.  PLEURA: No pleural effusion.  VESSELS: Within normal limits.  HEART: Enlarged. Coronary artery, aortic valve and mitral annular   calcification. No pericardial effusion.  MEDIASTINUM AND JUAN: No lymphadenopathy.  CHEST WALL AND LOWER NECK: Bilateral gynecomastia. Left supraclavicular   calcification, likely calcified lymph nodes.    ABDOMEN AND PELVIS:  LIVER: Cirrhosis. No evidence of a liver mass on this single phase exam.  BILE DUCTS: Normal caliber.  GALLBLADDER: Distended. Cholelithiasis.  SPLEEN: Within normal limits.  PANCREAS: Within normal limits.  ADRENALS: Within normal limits.  KIDNEYS/URETERS: No hydronephrosis. Hypodense left renal lesion, likely a   cyst.    BLADDER: Distended.  REPRODUCTIVE ORGANS: Prostate within normal limits.    BOWEL: Small hiatal hernia. No bowel obstruction. Moderate volume stool   within the rectum.  PERITONEUM/MESENTERY: Minimal perihepatic fluid. Indeterminate mass   within the right upper quadrant small bowel mesentery measuring 2.0 x 1.6   cm (2, 94).  VESSELS: Atherosclerotic change.  RETROPERITONEUM/LYMPH NODES: Mildly enlarged periportal lymph nodes,   likely reactive to liver disease.  ABDOMINAL WALL: Small left inguinal hernia containing fat and trace fluid.  BONES: Degenerative changes of the spine.    IMPRESSION:  *  No pneumonia or lung mass.  *  Cirrhosis. No definite evidence for portal hypertension.  *  2 cm indeterminate mass within the right upper quadrant small bowel   mesentery.  *  Distended bladder. Moderate volume stool within the rectum.        --- End of Report ---            ENIO WELLS DO; Attending Radiologist  This document has been electronically signed. 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 Patient poor historian so information gathered from charting.  Per chart, daughter reports patient at baseline is fully independent and does all ADLs/IADLs however this morning she could not wake him unless she violently jerked him. She states patient normally takes Rifaximin BID however he ran out of medication [facility]. Patient last took Rifaximin on Saturday 12/9/23. In the ambulance bay patient coughed up blood which daughter states has never occurred before. [facility] was in Sep 2023 and notable only for several angioectasias s/p APC.     Currently hemodynamically stable and afebrile. Labs notable for Hgb 8, , plat 143, Na 149, Cr 1.79, normal LFTs. CT with evidence of cirrhosis without portal hypertension,  2 cm indeterminate mass within the right upper quadrant small bowel mesentery and moderate stool burden.       PAST MEDICAL & SURGICAL HISTORY:  HTN (hypertension)      Hepatic encephalopathy          Home Medications:  amLODIPine 10 mg oral tablet: 1 tab(s) orally once a day (12 Dec 2023 16:39)  ascorbic acid 500 mg oral tablet: 1 tab(s) orally once a day (12 Dec 2023 16:38)  folic acid 1 mg oral tablet: 1 tab(s) orally once a day (12 Dec 2023 16:38)  furosemide 40 mg oral tablet: 1 tab(s) orally once a day (12 Dec 2023 16:38)  ketorolac 0.5% ophthalmic solution: 1 drop(s) in the left eye 3 times a day ***pt was supposed to go to eye [facility] on Thursday- just had surgery*** (12 Dec 2023 16:39)  lactulose 10 g/15 mL oral and rectal liquid: 10 gram(s) orally once a day (12 Dec 2023 16:40)  loratadine 10 mg oral tablet: 1 tab(s) orally once a day (12 Dec 2023 16:38)  montelukast 10 mg oral tablet: 1 tab(s) orally once a day (12 Dec 2023 16:38)  Multiple Vitamins oral tablet: 1 tab(s) orally 2 times a day (12 Dec 2023 16:39)  pantoprazole 40 mg oral delayed release tablet: 1 tab(s) orally 2 times a day (12 Dec 2023 16:38)  prednisoLONE acetate 1% ophthalmic suspension: 1 drop(s) in the left eye 3 times a day ***pt was supposed to go to eye [facility] on Thursday- just had surgery*** (12 Dec 2023 16:39)  rifAXIMin 550 mg oral tablet: 1 tab(s) orally 2 times a day (12 Dec 2023 16:39)  spironolactone 100 mg oral tablet: 1 tab(s) orally once a day (12 Dec 2023 16:39)      MEDICATIONS  (STANDING):  amLODIPine   Tablet 10 milliGRAM(s) Oral daily  ascorbic acid 500 milliGRAM(s) Oral daily  ferrous    sulfate 325 milliGRAM(s) Oral daily  folic acid 1 milliGRAM(s) Oral daily  furosemide    Tablet 40 milliGRAM(s) Oral daily  ketorolac 0.5% Ophthalmic Solution 1 Drop(s) Both EYES three times a day  lactulose Syrup 20 Gram(s) Oral two times a day  LORazepam   Injectable 1 milliGRAM(s) IV Push once  montelukast 10 milliGRAM(s) Oral at bedtime  pantoprazole  Injectable 40 milliGRAM(s) IV Push two times a day  prednisoLONE acetate 1% Suspension 1 Drop(s) Both EYES three times a day  rifAXIMin 550 milliGRAM(s) Oral two times a day  sodium chloride 0.9%. 1000 milliLiter(s) (83 mL/Hr) IV Continuous <Continuous>  spironolactone 100 milliGRAM(s) Oral daily    MEDICATIONS  (PRN):  acetaminophen     Tablet .. 650 milliGRAM(s) Oral every 6 hours PRN Mild Pain (1 - 3)  aluminum hydroxide/magnesium hydroxide/simethicone Suspension 30 milliLiter(s) Oral every 4 hours PRN Dyspepsia  LORazepam   Injectable 1 milliGRAM(s) IV Push once PRN Agitation  melatonin 3 milliGRAM(s) Oral at bedtime PRN Insomnia  ondansetron Injectable 4 milliGRAM(s) IV Push every 6 hours PRN Nausea and/or Vomiting      Allergies    penicillin (Unknown)  Mushrooms (Unknown)    Intolerances        SOCIAL HISTORY:    FAMILY HISTORY:      ROS  As above  Otherwise unremarkable    Vital Signs Last 24 Hrs  T(C): 36.9 (13 Dec 2023 08:38), Max: 36.9 (12 Dec 2023 15:00)  T(F): 98.5 (13 Dec 2023 08:38), Max: 98.5 (13 Dec 2023 02:23)  HR: 98 (13 Dec 2023 08:38) (73 - 98)  BP: 127/68 (13 Dec 2023 08:38) (111/48 - 147/68)  BP(mean): --  RR: 19 (13 Dec 2023 08:38) (16 - 20)  SpO2: 97% (13 Dec 2023 08:38) (97% - 100%)    Parameters below as of 13 Dec 2023 08:38  Patient On (Oxygen Delivery Method): room air        Constitutional: NAD, well-developed  Respiratory: CTAB  Cardiovascular: S1 and S2, RRR  Gastrointestinal: BS+, soft, NT/ND  Extremities: No peripheral edema  Psychiatric: Normal mood, normal affect, AAO x 2, asterixis    LABS:                        8.0    5.96  )-----------( 143      ( 13 Dec 2023 10:02 )             24.6     12-13    149<H>  |  122<H>  |  53<H>  ----------------------------<  144<H>  4.2   |  18<L>  |  1.79<H>    Ca    9.3      13 Dec 2023 10:02    TPro  6.8  /  Alb  2.8<L>  /  TBili  1.1  /  DBili  x   /  AST  37  /  ALT  21  /  AlkPhos  95  12-13    PT/INR - ( 12 Dec 2023 11:10 )   PT: 13.1 sec;   INR: 1.16 ratio         PTT - ( 12 Dec 2023 11:10 )  PTT:36.6 sec  LIVER FUNCTIONS - ( 13 Dec 2023 10:02 )  Alb: 2.8 g/dL / Pro: 6.8 gm/dL / ALK PHOS: 95 U/L / ALT: 21 U/L / AST: 37 U/L / GGT: x             RADIOLOGY & ADDITIONAL STUDIES:    ACC: [id number] EXAM:  CT ABDOMEN AND PELVIS IC   ORDERED BY: LEIA JIM     ACC: [id number] EXAM:  CT CHEST IC   ORDERED BY: LEIA JIM     PROCEDURE DATE:  12/12/2023          INTERPRETATION:  CLINICAL INFORMATION: Altered mental status. Coughing or   vomiting up blood.    COMPARISON: None.    CONTRAST/COMPLICATIONS:  IV Contrast: Omnipaque 350 (accession [id number]), IV contrast documented   in unlinked concurrent exam (accession [id number])  90 cc administered   10   cc discarded  Oral Contrast: NONE  Complications: None reported at time of study completion    PROCEDURE:  CT of the Chest, Abdomen and Pelvis was performed.  Sagittal and coronal reformats were performed.    FINDINGS:  CHEST:  LUNGS AND LARGE AIRWAYS: Patent central airways. No lung consolidation.   Few tiny bilateral pulmonary nodules measuring up to 3 mm.  PLEURA: No pleural effusion.  VESSELS: Within normal limits.  HEART: Enlarged. Coronary artery, aortic valve and mitral annular   calcification. No pericardial effusion.  MEDIASTINUM AND JUAN: No lymphadenopathy.  CHEST WALL AND LOWER NECK: Bilateral gynecomastia. Left supraclavicular   calcification, likely calcified lymph nodes.    ABDOMEN AND PELVIS:  LIVER: Cirrhosis. No evidence of a liver mass on this single phase exam.  BILE DUCTS: Normal caliber.  GALLBLADDER: Distended. Cholelithiasis.  SPLEEN: Within normal limits.  PANCREAS: Within normal limits.  ADRENALS: Within normal limits.  KIDNEYS/URETERS: No hydronephrosis. Hypodense left renal lesion, likely a   cyst.    BLADDER: Distended.  REPRODUCTIVE ORGANS: Prostate within normal limits.    BOWEL: Small hiatal hernia. No bowel obstruction. Moderate volume stool   within the rectum.  PERITONEUM/MESENTERY: Minimal perihepatic fluid. Indeterminate mass   within the right upper quadrant small bowel mesentery measuring 2.0 x 1.6   cm (2, 94).  VESSELS: Atherosclerotic change.  RETROPERITONEUM/LYMPH NODES: Mildly enlarged periportal lymph nodes,   likely reactive to liver disease.  ABDOMINAL WALL: Small left inguinal hernia containing fat and trace fluid.  BONES: Degenerative changes of the spine.    IMPRESSION:  *  No pneumonia or lung mass.  *  Cirrhosis. No definite evidence for portal hypertension.  *  2 cm indeterminate mass within the right upper quadrant small bowel   mesentery.  *  Distended bladder. Moderate volume stool within the rectum.        --- End of Report ---            ENIO WELLS DO; Attending Radiologist  This document has been electronically signed. Dec 12 35537:28PM

## 2023-12-13 NOTE — PROGRESS NOTE ADULT - SUBJECTIVE AND OBJECTIVE BOX
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 Patient poor historian so information gathered from charting.  Per chart, daughter reports patient at baseline is fully independent and does all ADLs/IADLs however this morning she could not wake him unless she violently jerked him. She states patient normally takes Rifaximin BID however he ran out of medication and [facility]. Patient last took Rifaximin on Saturday 12/9/23. In the ambulance bay patient coughed up blood which daughter states has never occurred before. [facility] was in Sep 2023 and notable only for several angioectasias s/p APC.     Currently hemodynamically stable and afebrile. Labs notable for Hgb 8, , plat 143, Na 149, Cr 1.79, normal LFTs. CT with evidence of cirrhosis without portal hypertension,  2 cm indeterminate mass within the right upper quadrant small bowel mesentery and moderate stool burden.     12.13: more alert today, able to tolerate diet, ambulated w assistance            REVIEW OF SYSTEMS:    CONSTITUTIONAL: No weakness, No fevers or chills  ENT: No ear ache, No sorethroat  NECK: No pain, No stiffness  RESPIRATORY: No cough, No wheezing, No hemoptysis; No dyspnea  CARDIOVASCULAR: No chest pain, No palpitations  GASTROINTESTINAL: No abd pain, No nausea, No vomiting, No hematemesis, No diarrhea or constipation. No melena, No hematochezia.  GENITOURINARY: No dysuria, No  hematuria  NEUROLOGICAL: No diplopia, No paresthesia, No motor dysfunction  MUSCULOSKELETAL: No arthralgia, No myalgia  SKIN: No rashes, or lesions   PSYCH: no anxiety, no suicidal ideation    All other review of systems is negative unless indicated above    Vital Signs Last 24 Hrs  T(C): 36.6 (13 Dec 2023 16:58), Max: 36.9 (13 Dec 2023 02:23)  T(F): 97.9 (13 Dec 2023 16:58), Max: 98.5 (13 Dec 2023 02:23)  HR: 98 (13 Dec 2023 16:58) (92 - 98)  BP: 119/52 (13 Dec 2023 16:58) (117/57 - 147/68)  BP(mean): --  RR: 19 (13 Dec 2023 16:58) (19 - 20)  SpO2: 100% (13 Dec 2023 16:58) (97% - 100%)    Parameters below as of 13 Dec 2023 16:58  Patient On (Oxygen Delivery Method): room air        PHYSICAL EXAM:    GENERAL: NAD  HEENT:  NC/AT, EOMI, PERRLA, No scleral icterus, Moist mucous membranes  NECK: Supple, No JVD  CNS:  Alert & Oriented X3, Motor Strength 5/5 B/L upper and lower extremities; DTRs 2+ intact   LUNG: Normal Breath sounds, Clear to auscultation bilaterally, No rales, No rhonchi, No wheezing  HEART: RRR; No murmurs, No rubs  ABDOMEN: +BS, ST/ND/NT  GENITOURINARY: Voiding, Bladder not distended  EXTREMITIES:  2+ Peripheral Pulses, No clubbing, No cyanosis, No tibial edema  MUSCULOSKELTAL: Joints normal ROM, No TTP, No effusion  VAGINAL: deferred  SKIN: no rashes  RECTAL: deferred, not indicated  BREAST: deferred                          8.0    5.96  )-----------( 143      ( 13 Dec 2023 10:02 )             24.6     12-13    149<H>  |  122<H>  |  53<H>  ----------------------------<  144<H>  4.2   |  18<L>  |  1.79<H>    Ca    9.3      13 Dec 2023 10:02    TPro  6.8  /  Alb  2.8<L>  /  TBili  1.1  /  DBili  x   /  AST  37  /  ALT  21  /  AlkPhos  95  12-13    Vancomycin levels:   Cultures:     MEDICATIONS  (STANDING):  amLODIPine   Tablet 10 milliGRAM(s) Oral daily  ascorbic acid 500 milliGRAM(s) Oral daily  ferrous    sulfate 325 milliGRAM(s) Oral daily  folic acid 1 milliGRAM(s) Oral daily  furosemide    Tablet 40 milliGRAM(s) Oral daily  ketorolac 0.5% Ophthalmic Solution 1 Drop(s) Both EYES three times a day  lactulose Syrup 20 Gram(s) Oral two times a day  LORazepam   Injectable 1 milliGRAM(s) IV Push once  montelukast 10 milliGRAM(s) Oral at bedtime  pantoprazole  Injectable 40 milliGRAM(s) IV Push two times a day  prednisoLONE acetate 1% Suspension 1 Drop(s) Both EYES three times a day  rifAXIMin 550 milliGRAM(s) Oral two times a day  sodium chloride 0.9%. 1000 milliLiter(s) (83 mL/Hr) IV Continuous <Continuous>  spironolactone 100 milliGRAM(s) Oral daily    MEDICATIONS  (PRN):  acetaminophen     Tablet .. 650 milliGRAM(s) Oral every 6 hours PRN Mild Pain (1 - 3)  aluminum hydroxide/magnesium hydroxide/simethicone Suspension 30 milliLiter(s) Oral every 4 hours PRN Dyspepsia  LORazepam   Injectable 1 milliGRAM(s) IV Push once PRN Agitation  melatonin 3 milliGRAM(s) Oral at bedtime PRN Insomnia  ondansetron Injectable 4 milliGRAM(s) IV Push every 6 hours PRN Nausea and/or Vomiting      all labs reviewed  all imaging reviewed    a/p:      1. Hepatic Encephalopathy:  due to medical noncompliance  restart Rifaximin, Lactulose Bid    2. Cirrhosis:  c/w Aldactone  stop Lasix due to BRENNAN    3. h/o UGIB:  s/p recent EGD w cauterisation of angiectasia   c/w PPI orally     4. BRENNAN:  prerenal azotemia?  stop Lasix  IVF    5. Chronic Anemia:  on Feosol

## 2023-12-13 NOTE — PATIENT PROFILE ADULT - FALL HARM RISK - HARM RISK INTERVENTIONS
Assistance with ambulation/Assistance OOB with selected safe patient handling equipment/Communicate Risk of Fall with Harm to all staff/Discuss with provider need for PT consult/Monitor gait and stability/Reinforce activity limits and safety measures with patient and family/Tailored Fall Risk Interventions/Visual Cue: Yellow wristband and red socks/Bed in lowest position, wheels locked, appropriate side rails in place/Call bell, personal items and telephone in reach/Instruct patient to call for assistance before getting out of bed or chair/Non-slip footwear when patient is out of bed/[place] to call system/Physically safe environment - no spills, clutter or unnecessary equipment/Purposeful Proactive Rounding/Room/bathroom lighting operational, light cord in reach Assistance with ambulation/Assistance OOB with selected safe patient handling equipment/Communicate Risk of Fall with Harm to all staff/Discuss with provider need for PT consult/Monitor gait and stability/Reinforce activity limits and safety measures with patient and family/Tailored Fall Risk Interventions/Visual Cue: Yellow wristband and red socks/Bed in lowest position, wheels locked, appropriate side rails in place/Call bell, personal items and telephone in reach/Instruct patient to call for assistance before getting out of bed or chair/Non-slip footwear when patient is out of bed/[place] to call system/Physically safe environment - no spills, clutter or unnecessary equipment/Purposeful Proactive Rounding/Room/bathroom lighting operational, light cord in reach

## 2023-12-13 NOTE — PATIENT PROFILE ADULT - FALL HARM RISK - CONCLUSION
[employer]air Preamble Text (Leave Blank If You Do Not Want): Undermining was performed with blunt dissection. Fall with Harm Risk

## 2023-12-14 LAB
ANION GAP SERPL CALC-SCNC: 9 MMOL/L — SIGNIFICANT CHANGE UP (ref 5–17)
ANION GAP SERPL CALC-SCNC: 9 MMOL/L — SIGNIFICANT CHANGE UP (ref 5–17)
BUN SERPL-MCNC: 53 MG/DL — HIGH (ref 7–23)
BUN SERPL-MCNC: 53 MG/DL — HIGH (ref 7–23)
CALCIUM SERPL-MCNC: 8.6 MG/DL — SIGNIFICANT CHANGE UP (ref 8.5–10.1)
CALCIUM SERPL-MCNC: 8.6 MG/DL — SIGNIFICANT CHANGE UP (ref 8.5–10.1)
CHLORIDE SERPL-SCNC: 111 MMOL/L — HIGH (ref 96–108)
CHLORIDE SERPL-SCNC: 111 MMOL/L — HIGH (ref 96–108)
CO2 SERPL-SCNC: 19 MMOL/L — LOW (ref 22–31)
CO2 SERPL-SCNC: 19 MMOL/L — LOW (ref 22–31)
CREAT SERPL-MCNC: 1.96 MG/DL — HIGH (ref 0.5–1.3)
CREAT SERPL-MCNC: 1.96 MG/DL — HIGH (ref 0.5–1.3)
EGFR: 34 ML/MIN/1.73M2 — LOW
EGFR: 34 ML/MIN/1.73M2 — LOW
FOLATE SERPL-MCNC: >20 NG/ML — SIGNIFICANT CHANGE UP
FOLATE SERPL-MCNC: >20 NG/ML — SIGNIFICANT CHANGE UP
GLUCOSE BLDC GLUCOMTR-MCNC: 122 MG/DL — HIGH (ref 70–99)
GLUCOSE BLDC GLUCOMTR-MCNC: 122 MG/DL — HIGH (ref 70–99)
GLUCOSE SERPL-MCNC: 194 MG/DL — HIGH (ref 70–99)
GLUCOSE SERPL-MCNC: 194 MG/DL — HIGH (ref 70–99)
HCT VFR BLD CALC: 22 % — LOW (ref 39–50)
HCT VFR BLD CALC: 22 % — LOW (ref 39–50)
HGB BLD-MCNC: 6.9 G/DL — CRITICAL LOW (ref 13–17)
HGB BLD-MCNC: 6.9 G/DL — CRITICAL LOW (ref 13–17)
MCHC RBC-ENTMCNC: 31.4 GM/DL — LOW (ref 32–36)
MCHC RBC-ENTMCNC: 31.4 GM/DL — LOW (ref 32–36)
MCHC RBC-ENTMCNC: 35.9 PG — HIGH (ref 27–34)
MCHC RBC-ENTMCNC: 35.9 PG — HIGH (ref 27–34)
MCV RBC AUTO: 114.6 FL — HIGH (ref 80–100)
MCV RBC AUTO: 114.6 FL — HIGH (ref 80–100)
PLATELET # BLD AUTO: 127 K/UL — LOW (ref 150–400)
PLATELET # BLD AUTO: 127 K/UL — LOW (ref 150–400)
POTASSIUM SERPL-MCNC: 4.3 MMOL/L — SIGNIFICANT CHANGE UP (ref 3.5–5.3)
POTASSIUM SERPL-MCNC: 4.3 MMOL/L — SIGNIFICANT CHANGE UP (ref 3.5–5.3)
POTASSIUM SERPL-SCNC: 4.3 MMOL/L — SIGNIFICANT CHANGE UP (ref 3.5–5.3)
POTASSIUM SERPL-SCNC: 4.3 MMOL/L — SIGNIFICANT CHANGE UP (ref 3.5–5.3)
RBC # BLD: 1.92 M/UL — LOW (ref 4.2–5.8)
RBC # BLD: 1.92 M/UL — LOW (ref 4.2–5.8)
RBC # FLD: 18 % — HIGH (ref 10.3–14.5)
RBC # FLD: 18 % — HIGH (ref 10.3–14.5)
SODIUM SERPL-SCNC: 139 MMOL/L — SIGNIFICANT CHANGE UP (ref 135–145)
SODIUM SERPL-SCNC: 139 MMOL/L — SIGNIFICANT CHANGE UP (ref 135–145)
VIT B12 SERPL-MCNC: 1804 PG/ML — HIGH (ref 232–1245)
VIT B12 SERPL-MCNC: 1804 PG/ML — HIGH (ref 232–1245)
WBC # BLD: 5.65 K/UL — SIGNIFICANT CHANGE UP (ref 3.8–10.5)
WBC # BLD: 5.65 K/UL — SIGNIFICANT CHANGE UP (ref 3.8–10.5)
WBC # FLD AUTO: 5.65 K/UL — SIGNIFICANT CHANGE UP (ref 3.8–10.5)
WBC # FLD AUTO: 5.65 K/UL — SIGNIFICANT CHANGE UP (ref 3.8–10.5)

## 2023-12-14 PROCEDURE — 99233 SBSQ HOSP IP/OBS HIGH 50: CPT

## 2023-12-14 RX ORDER — PANTOPRAZOLE SODIUM 20 MG/1
40 TABLET, DELAYED RELEASE ORAL EVERY 12 HOURS
Refills: 0 | Status: DISCONTINUED | OUTPATIENT
Start: 2023-12-14 | End: 2023-12-16

## 2023-12-14 RX ADMIN — SODIUM CHLORIDE 83 MILLILITER(S): 9 INJECTION INTRAMUSCULAR; INTRAVENOUS; SUBCUTANEOUS at 21:48

## 2023-12-14 RX ADMIN — Medication 500 MILLIGRAM(S): at 09:09

## 2023-12-14 RX ADMIN — Medication 1 DROP(S): at 21:14

## 2023-12-14 RX ADMIN — Medication 1 MILLIGRAM(S): at 09:08

## 2023-12-14 RX ADMIN — PANTOPRAZOLE SODIUM 40 MILLIGRAM(S): 20 TABLET, DELAYED RELEASE ORAL at 21:12

## 2023-12-14 RX ADMIN — Medication 1 DROP(S): at 14:45

## 2023-12-14 RX ADMIN — PANTOPRAZOLE SODIUM 40 MILLIGRAM(S): 20 TABLET, DELAYED RELEASE ORAL at 09:09

## 2023-12-14 RX ADMIN — PANTOPRAZOLE SODIUM 40 MILLIGRAM(S): 20 TABLET, DELAYED RELEASE ORAL at 12:41

## 2023-12-14 RX ADMIN — MONTELUKAST 10 MILLIGRAM(S): 4 TABLET, CHEWABLE ORAL at 21:11

## 2023-12-14 RX ADMIN — SPIRONOLACTONE 100 MILLIGRAM(S): 25 TABLET, FILM COATED ORAL at 09:08

## 2023-12-14 RX ADMIN — Medication 1 DROP(S): at 05:04

## 2023-12-14 RX ADMIN — LACTULOSE 20 GRAM(S): 10 SOLUTION ORAL at 09:09

## 2023-12-14 RX ADMIN — LACTULOSE 20 GRAM(S): 10 SOLUTION ORAL at 21:14

## 2023-12-14 RX ADMIN — SODIUM CHLORIDE 83 MILLILITER(S): 9 INJECTION INTRAMUSCULAR; INTRAVENOUS; SUBCUTANEOUS at 05:05

## 2023-12-14 RX ADMIN — Medication 325 MILLIGRAM(S): at 09:08

## 2023-12-14 RX ADMIN — AMLODIPINE BESYLATE 10 MILLIGRAM(S): 2.5 TABLET ORAL at 09:08

## 2023-12-14 RX ADMIN — Medication 1 DROP(S): at 21:48

## 2023-12-14 NOTE — PROGRESS NOTE ADULT - SUBJECTIVE AND OBJECTIVE BOX
Patient is a 78y old  Male who presents with a chief complaint of AMS (13 Dec 2023 19:50)      Subective: Seen and examined at bedside. Mentation improved. Denies abdominal pain, nausea or vomiting. Having bowel movements which are darker in color but not consistent with overt melena per nurse.       PAST MEDICAL & SURGICAL HISTORY:  HTN (hypertension)      Hepatic encephalopathy          MEDICATIONS  (STANDING):  amLODIPine   Tablet 10 milliGRAM(s) Oral daily  ascorbic acid 500 milliGRAM(s) Oral daily  ferrous    sulfate 325 milliGRAM(s) Oral daily  folic acid 1 milliGRAM(s) Oral daily  ketorolac 0.5% Ophthalmic Solution 1 Drop(s) Both EYES three times a day  lactulose Syrup 20 Gram(s) Oral two times a day  LORazepam   Injectable 1 milliGRAM(s) IV Push once  montelukast 10 milliGRAM(s) Oral at bedtime  pantoprazole  Injectable 40 milliGRAM(s) IV Push every 12 hours  prednisoLONE acetate 1% Suspension 1 Drop(s) Both EYES three times a day  rifAXIMin 550 milliGRAM(s) Oral two times a day  sodium chloride 0.9%. 1000 milliLiter(s) (83 mL/Hr) IV Continuous <Continuous>  spironolactone 100 milliGRAM(s) Oral daily    MEDICATIONS  (PRN):  acetaminophen     Tablet .. 650 milliGRAM(s) Oral every 6 hours PRN Mild Pain (1 - 3)  aluminum hydroxide/magnesium hydroxide/simethicone Suspension 30 milliLiter(s) Oral every 4 hours PRN Dyspepsia  LORazepam   Injectable 1 milliGRAM(s) IV Push once PRN Agitation  melatonin 3 milliGRAM(s) Oral at bedtime PRN Insomnia  ondansetron Injectable 4 milliGRAM(s) IV Push every 6 hours PRN Nausea and/or Vomiting      REVIEW OF SYSTEMS:    RESPIRATORY: No shortness of breath  CARDIOVASCULAR: No chest pain  All other review of systems is negative unless indicated above.    Vital Signs Last 24 Hrs  T(C): 36.9 (14 Dec 2023 08:06), Max: 37.2 (13 Dec 2023 22:13)  T(F): 98.4 (14 Dec 2023 08:06), Max: 98.9 (13 Dec 2023 22:13)  HR: 72 (14 Dec 2023 08:06) (72 - 98)  BP: 119/70 (14 Dec 2023 08:06) (119/52 - 127/58)  BP(mean): --  RR: 18 (14 Dec 2023 08:06) (18 - 19)  SpO2: 100% (14 Dec 2023 08:06) (100% - 100%)    Parameters below as of 14 Dec 2023 08:06  Patient On (Oxygen Delivery Method): room air        PHYSICAL EXAM:    Constitutional: NAD  Respiratory: CTAB  Cardiovascular: S1 and S2, RRR  Gastrointestinal: BS+, soft, NT/ND  Extremities: No peripheral edema  Psychiatric: Normal mood, normal affect, AAO x 2, no asterixis    LABS:                        6.9    5.65  )-----------( 127      ( 14 Dec 2023 09:26 )             22.0     12-14    139  |  111<H>  |  53<H>  ----------------------------<  194<H>  4.3   |  19<L>  |  1.96<H>    Ca    8.6      14 Dec 2023 09:26    TPro  6.8  /  Alb  2.8<L>  /  TBili  1.1  /  DBili  x   /  AST  37  /  ALT  21  /  AlkPhos  95  12-13      LIVER FUNCTIONS - ( 13 Dec 2023 10:02 )  Alb: 2.8 g/dL / Pro: 6.8 gm/dL / ALK PHOS: 95 U/L / ALT: 21 U/L / AST: 37 U/L / GGT: x             RADIOLOGY & ADDITIONAL STUDIES

## 2023-12-14 NOTE — DIETITIAN INITIAL EVALUATION ADULT - NS FNS DIET ORDER
Diet, NPO after Midnight:      NPO Start Date: 14-Dec-2023,   NPO Start Time: 23:59 (12-14-23 @ 12:00)

## 2023-12-14 NOTE — DIETITIAN INITIAL EVALUATION ADULT - ORAL INTAKE PTA/DIET HISTORY
Lives at home w/ his dtr who assists w/ cooking and grocery shopping. Reports of normally fair appetite PTA, consumes 3 meals per day. Noted w/ AMS however improving. Lives at home w/ his dtr who assists w/ cooking and grocery shopping. Reports of normally fair appetite PTA, consumes 3 meals per day. Noted w/ AMS however improving. Mushroom allergy noted in chart and confirmed by pt.

## 2023-12-14 NOTE — DIETITIAN INITIAL EVALUATION ADULT - OTHER INFO
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 Daughter reports patient at baseline is fully independent and does all ADLs/IADLs however this morning she could not wake him unless she violently jerked him. She states patient normally takes Rifaximin BID however he ran out of medication [facility]. Patient last took Rifaximin on Saturday 12/9/23. In the ambulance bay patient coughed up blood which daughter states has never occurred before. [facility] was in Sep 2023 and notable only for several angioectasal bleeds which were cauterized.  GOC: full code.    Was NPO however now advanced to FLD. Mentation status improving upon RD visit. Stated UBW at ~260# and endorses wt loss over "a long period of time" (did not specify), stating current wt at 198#. Bed scale wt of 183# taken by RD on 12/14; 77# wt loss/ 29.61% x unknown time frame. NFPE reveals varying degrees of muscle/fat wasting. GI following, plan for EGD tomorrow and to c/w lactulose w/ goal 3-4 BMs per day; no signs of bleeding. See recommendations below.

## 2023-12-14 NOTE — DIETITIAN INITIAL EVALUATION ADULT - NSFNSGIIOFT_GEN_A_CORE
I&O's Detail    14 Dec 2023 07:01  -  14 Dec 2023 13:36  --------------------------------------------------------  IN:  Total IN: 0 mL    OUT:    Voided (mL): 275 mL  Total OUT: 275 mL    Total NET: -275 mL

## 2023-12-14 NOTE — DIETITIAN NUTRITION RISK NOTIFICATION - TREATMENT: THE FOLLOWING DIET HAS BEEN RECOMMENDED
Diet, NPO after Midnight:      NPO Start Date: 14-Dec-2023,   NPO Start Time: 23:59 (12-14-23 @ 12:00) [Active]  Diet, NPO:   NPO for Procedure/Test     NPO Start Date: 13-Dec-2023,   NPO Start Time: 00:01  Except Medications  With Ice Chips/Sips of Water (12-12-23 @ 20:55) [Active]

## 2023-12-14 NOTE — PROGRESS NOTE ADULT - SUBJECTIVE AND OBJECTIVE BOX
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 Patient poor historian so information gathered from charting.  Per chart, daughter reports patient at baseline is fully independent and does all ADLs/IADLs however this morning she could not wake him unless she violently jerked him. She states patient normally takes Rifaximin BID however he ran out of medication and [facility]. Patient last took Rifaximin on Saturday 12/9/23. In the ambulance bay patient coughed up blood which daughter states has never occurred before. [facility] his [facility] was in Sep 2023 and notable only for several angioectasias s/p APC.     Currently hemodynamically stable and afebrile. Labs notable for Hgb 8, , plat 143, Na 149, Cr 1.79, normal LFTs. CT with evidence of cirrhosis without portal hypertension,  2 cm indeterminate mass within the right upper quadrant small bowel mesentery and moderate stool burden.     12.13: more alert today, able to tolerate diet, ambulated w assistance  12.14: mentation normal, +dark stool, no cp, no n/v          REVIEW OF SYSTEMS:    CONSTITUTIONAL: No weakness, No fevers or chills  ENT: No ear ache, No sorethroat  NECK: No pain, No stiffness  RESPIRATORY: No cough, No wheezing, No hemoptysis; No dyspnea  CARDIOVASCULAR: No chest pain, No palpitations  GASTROINTESTINAL: No abd pain, No nausea, No vomiting, No hematemesis, No diarrhea or constipation. + melena, No hematochezia.  GENITOURINARY: No dysuria, No  hematuria  NEUROLOGICAL: No diplopia, No paresthesia, No motor dysfunction  MUSCULOSKELETAL: No arthralgia, No myalgia  SKIN: No rashes, or lesions   PSYCH: no anxiety, no suicidal ideation    All other review of systems is negative unless indicated above    Vital Signs Last 24 Hrs  T(C): 36.9 (14 Dec 2023 14:50), Max: 37.2 (13 Dec 2023 22:13)  T(F): 98.4 (14 Dec 2023 14:50), Max: 98.9 (13 Dec 2023 22:13)  HR: 78 (14 Dec 2023 14:50) (72 - 98)  BP: 122/72 (14 Dec 2023 14:50) (115/31 - 127/58)  RR: 18 (14 Dec 2023 14:50) (18 - 19)  SpO2: 100% (14 Dec 2023 14:50) (99% - 100%)    Parameters below as of 14 Dec 2023 14:50  Patient On (Oxygen Delivery Method): room air          PHYSICAL EXAM:    GENERAL: NAD  HEENT:  NC/AT, EOMI, PERRLA, No scleral icterus, Moist mucous membranes  NECK: Supple, No JVD  CNS:  Alert & Oriented X3, Motor Strength 5/5 B/L upper and lower extremities; DTRs 2+ intact   LUNG: Normal Breath sounds, Clear to auscultation bilaterally, No rales, No rhonchi, No wheezing  HEART: RRR; No murmurs, No rubs  ABDOMEN: +BS, ST/ND/NT  GENITOURINARY: Voiding, Bladder not distended  EXTREMITIES:  2+ Peripheral Pulses, No clubbing, No cyanosis, No tibial edema  MUSCULOSKELTAL: Joints normal ROM, No TTP, No effusion  SKIN: no rashes  RECTAL: deferred, not indicated  BREAST: deferred               Labs:                        6.9    5.65  )-----------( 127      ( 14 Dec 2023 09:26 )             22.0     12-14    139  |  111<H>  |  53<H>  ----------------------------<  194<H>  4.3   |  19<L>  |  1.96<H>    Ca    8.6      14 Dec 2023 09:26    TPro  6.8  /  Alb  2.8<L>  /  TBili  1.1  /  DBili  x   /  AST  37  /  ALT  21  /  AlkPhos  95  12-13         MEDICATIONS  (STANDING):  amLODIPine   Tablet 10 milliGRAM(s) Oral daily  ascorbic acid 500 milliGRAM(s) Oral daily  ferrous    sulfate 325 milliGRAM(s) Oral daily  folic acid 1 milliGRAM(s) Oral daily  ketorolac 0.5% Ophthalmic Solution 1 Drop(s) Both EYES three times a day  lactulose Syrup 20 Gram(s) Oral two times a day  LORazepam   Injectable 1 milliGRAM(s) IV Push once  montelukast 10 milliGRAM(s) Oral at bedtime  pantoprazole  Injectable 40 milliGRAM(s) IV Push every 12 hours  prednisoLONE acetate 1% Suspension 1 Drop(s) Both EYES three times a day  rifAXIMin 550 milliGRAM(s) Oral two times a day  sodium chloride 0.9%. 1000 milliLiter(s) (83 mL/Hr) IV Continuous <Continuous>        all labs reviewed  all imaging reviewed    a/p:      1. Hepatic Encephalopathy: improved  due to medical noncompliance  restart Rifaximin, Lactulose Bid    2. Cirrhosis:  diuretic stopped due to BRENNAN    3. h/o UGIB:  Anemia: r/o acute blood loss   s/p recent EGD w cauterisation of angiectasia   c/w PPI   transfuse 1u RBC    4. BRENNAN:  prerenal azotemia?  stop diuretic   IVF

## 2023-12-14 NOTE — DIETITIAN INITIAL EVALUATION ADULT - PERTINENT LABORATORY DATA
12-14    139  |  111<H>  |  53<H>  ----------------------------<  194<H>  4.3   |  19<L>  |  1.96<H>    Ca    8.6      14 Dec 2023 09:26    TPro  6.8  /  Alb  2.8<L>  /  TBili  1.1  /  DBili  x   /  AST  37  /  ALT  21  /  AlkPhos  95  12-13

## 2023-12-14 NOTE — DIETITIAN INITIAL EVALUATION ADULT - ADD RECOMMEND
1) ADAT as per GI   2) Encourage protein-rich foods, maximize food preferences when diet advances  3) Monitor bowel movements, c/w current bowel regimen.   4) MVI w/ minerals daily to ensure 100% RDA met   5) In v/o malnutrition, consider adding 100mg thiamine daily  6) Obtain weekly wt to track/trend changes   7) Monitor daily lytes/min and replete prn   8) Confirm goals of care regarding nutrition support   RD will continue to monitor PO intake, labs, hydration, and wt prn.

## 2023-12-14 NOTE — DIETITIAN INITIAL EVALUATION ADULT - PERTINENT MEDS FT
MEDICATIONS  (STANDING):  amLODIPine   Tablet 10 milliGRAM(s) Oral daily  ascorbic acid 500 milliGRAM(s) Oral daily  ferrous    sulfate 325 milliGRAM(s) Oral daily  folic acid 1 milliGRAM(s) Oral daily  ketorolac 0.5% Ophthalmic Solution 1 Drop(s) Both EYES three times a day  lactulose Syrup 20 Gram(s) Oral two times a day  LORazepam   Injectable 1 milliGRAM(s) IV Push once  montelukast 10 milliGRAM(s) Oral at bedtime  pantoprazole  Injectable 40 milliGRAM(s) IV Push every 12 hours  prednisoLONE acetate 1% Suspension 1 Drop(s) Both EYES three times a day  rifAXIMin 550 milliGRAM(s) Oral two times a day  sodium chloride 0.9%. 1000 milliLiter(s) (83 mL/Hr) IV Continuous <Continuous>    MEDICATIONS  (PRN):  acetaminophen     Tablet .. 650 milliGRAM(s) Oral every 6 hours PRN Mild Pain (1 - 3)  aluminum hydroxide/magnesium hydroxide/simethicone Suspension 30 milliLiter(s) Oral every 4 hours PRN Dyspepsia  LORazepam   Injectable 1 milliGRAM(s) IV Push once PRN Agitation  melatonin 3 milliGRAM(s) Oral at bedtime PRN Insomnia  ondansetron Injectable 4 milliGRAM(s) IV Push every 6 hours PRN Nausea and/or Vomiting

## 2023-12-15 LAB
ANION GAP SERPL CALC-SCNC: 6 MMOL/L — SIGNIFICANT CHANGE UP (ref 5–17)
ANION GAP SERPL CALC-SCNC: 6 MMOL/L — SIGNIFICANT CHANGE UP (ref 5–17)
BUN SERPL-MCNC: 43 MG/DL — HIGH (ref 7–23)
BUN SERPL-MCNC: 43 MG/DL — HIGH (ref 7–23)
CALCIUM SERPL-MCNC: 8.3 MG/DL — LOW (ref 8.5–10.1)
CALCIUM SERPL-MCNC: 8.3 MG/DL — LOW (ref 8.5–10.1)
CHLORIDE SERPL-SCNC: 113 MMOL/L — HIGH (ref 96–108)
CHLORIDE SERPL-SCNC: 113 MMOL/L — HIGH (ref 96–108)
CO2 SERPL-SCNC: 20 MMOL/L — LOW (ref 22–31)
CO2 SERPL-SCNC: 20 MMOL/L — LOW (ref 22–31)
CREAT SERPL-MCNC: 1.64 MG/DL — HIGH (ref 0.5–1.3)
CREAT SERPL-MCNC: 1.64 MG/DL — HIGH (ref 0.5–1.3)
EGFR: 42 ML/MIN/1.73M2 — LOW
EGFR: 42 ML/MIN/1.73M2 — LOW
GLUCOSE BLDC GLUCOMTR-MCNC: 121 MG/DL — HIGH (ref 70–99)
GLUCOSE BLDC GLUCOMTR-MCNC: 121 MG/DL — HIGH (ref 70–99)
GLUCOSE SERPL-MCNC: 114 MG/DL — HIGH (ref 70–99)
GLUCOSE SERPL-MCNC: 114 MG/DL — HIGH (ref 70–99)
HCT VFR BLD CALC: 21.2 % — LOW (ref 39–50)
HCT VFR BLD CALC: 21.2 % — LOW (ref 39–50)
HGB BLD-MCNC: 7.2 G/DL — LOW (ref 13–17)
HGB BLD-MCNC: 7.2 G/DL — LOW (ref 13–17)
MCHC RBC-ENTMCNC: 34 GM/DL — SIGNIFICANT CHANGE UP (ref 32–36)
MCHC RBC-ENTMCNC: 34 GM/DL — SIGNIFICANT CHANGE UP (ref 32–36)
MCHC RBC-ENTMCNC: 35.6 PG — HIGH (ref 27–34)
MCHC RBC-ENTMCNC: 35.6 PG — HIGH (ref 27–34)
MCV RBC AUTO: 105 FL — HIGH (ref 80–100)
MCV RBC AUTO: 105 FL — HIGH (ref 80–100)
PLATELET # BLD AUTO: 102 K/UL — LOW (ref 150–400)
PLATELET # BLD AUTO: 102 K/UL — LOW (ref 150–400)
POTASSIUM SERPL-MCNC: 4.5 MMOL/L — SIGNIFICANT CHANGE UP (ref 3.5–5.3)
POTASSIUM SERPL-MCNC: 4.5 MMOL/L — SIGNIFICANT CHANGE UP (ref 3.5–5.3)
POTASSIUM SERPL-SCNC: 4.5 MMOL/L — SIGNIFICANT CHANGE UP (ref 3.5–5.3)
POTASSIUM SERPL-SCNC: 4.5 MMOL/L — SIGNIFICANT CHANGE UP (ref 3.5–5.3)
RBC # BLD: 2.02 M/UL — LOW (ref 4.2–5.8)
RBC # BLD: 2.02 M/UL — LOW (ref 4.2–5.8)
RBC # FLD: 19.7 % — HIGH (ref 10.3–14.5)
RBC # FLD: 19.7 % — HIGH (ref 10.3–14.5)
SODIUM SERPL-SCNC: 139 MMOL/L — SIGNIFICANT CHANGE UP (ref 135–145)
SODIUM SERPL-SCNC: 139 MMOL/L — SIGNIFICANT CHANGE UP (ref 135–145)
WBC # BLD: 4.35 K/UL — SIGNIFICANT CHANGE UP (ref 3.8–10.5)
WBC # BLD: 4.35 K/UL — SIGNIFICANT CHANGE UP (ref 3.8–10.5)
WBC # FLD AUTO: 4.35 K/UL — SIGNIFICANT CHANGE UP (ref 3.8–10.5)
WBC # FLD AUTO: 4.35 K/UL — SIGNIFICANT CHANGE UP (ref 3.8–10.5)

## 2023-12-15 PROCEDURE — 99233 SBSQ HOSP IP/OBS HIGH 50: CPT

## 2023-12-15 RX ADMIN — Medication 500 MILLIGRAM(S): at 18:18

## 2023-12-15 RX ADMIN — Medication 1 DROP(S): at 18:15

## 2023-12-15 RX ADMIN — MONTELUKAST 10 MILLIGRAM(S): 4 TABLET, CHEWABLE ORAL at 21:39

## 2023-12-15 RX ADMIN — Medication 1 DROP(S): at 21:42

## 2023-12-15 RX ADMIN — Medication 1 MILLIGRAM(S): at 18:19

## 2023-12-15 RX ADMIN — Medication 1 DROP(S): at 05:48

## 2023-12-15 RX ADMIN — PANTOPRAZOLE SODIUM 40 MILLIGRAM(S): 20 TABLET, DELAYED RELEASE ORAL at 21:40

## 2023-12-15 RX ADMIN — AMLODIPINE BESYLATE 10 MILLIGRAM(S): 2.5 TABLET ORAL at 18:18

## 2023-12-15 RX ADMIN — SODIUM CHLORIDE 83 MILLILITER(S): 9 INJECTION INTRAMUSCULAR; INTRAVENOUS; SUBCUTANEOUS at 21:43

## 2023-12-15 RX ADMIN — LACTULOSE 20 GRAM(S): 10 SOLUTION ORAL at 21:40

## 2023-12-15 RX ADMIN — SODIUM CHLORIDE 83 MILLILITER(S): 9 INJECTION INTRAMUSCULAR; INTRAVENOUS; SUBCUTANEOUS at 13:05

## 2023-12-15 RX ADMIN — Medication 1 DROP(S): at 21:54

## 2023-12-15 RX ADMIN — PANTOPRAZOLE SODIUM 40 MILLIGRAM(S): 20 TABLET, DELAYED RELEASE ORAL at 10:40

## 2023-12-15 RX ADMIN — Medication 325 MILLIGRAM(S): at 18:18

## 2023-12-15 RX ADMIN — Medication 1 DROP(S): at 05:26

## 2023-12-15 NOTE — PROGRESS NOTE ADULT - NUTRITIONAL ASSESSMENT
This patient has been assessed with a concern for Malnutrition and has been determined to have a diagnosis/diagnoses of Moderate protein-calorie malnutrition.    This patient is being managed with:   Diet NPO after Midnight-     NPO Start Date: 14-Dec-2023   NPO Start Time: 23:59  Entered: Dec 14 2023 12:00PM    Diet Full Liquid-  Entered: Dec 13 2023 11:38AM    Diet NPO-  NPO for Procedure/Test     NPO Start Date: 13-Dec-2023   NPO Start Time: 00:01  Except Medications  With Ice Chips/Sips of Water  Entered: Dec 12 2023  8:52PM  
This patient has been assessed with a concern for Malnutrition and has been determined to have a diagnosis/diagnoses of Moderate protein-calorie malnutrition.    This patient is being managed with:   Diet NPO after Midnight-     NPO Start Date: 14-Dec-2023   NPO Start Time: 23:59  Entered: Dec 14 2023 12:00PM    Diet Full Liquid-  Entered: Dec 13 2023 11:38AM    Diet NPO-  NPO for Procedure/Test     NPO Start Date: 13-Dec-2023   NPO Start Time: 00:01  Except Medications  With Ice Chips/Sips of Water  Entered: Dec 12 2023  8:52PM

## 2023-12-15 NOTE — CHART NOTE - NSCHARTNOTEFT_GEN_A_CORE
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

## 2023-12-15 NOTE — PROGRESS NOTE ADULT - SUBJECTIVE AND OBJECTIVE BOX
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 Patient poor historian so information gathered from charting.  Per chart, daughter reports patient at baseline is fully independent and does all ADLs/IADLs however this morning she could not wake him unless she violently jerked him. She states patient normally takes Rifaximin BID however he ran out of medication [facility]. Patient last took Rifaximin on Saturday 12/9/23. In the ambulance bay patient coughed up blood which daughter states has never occurred before. [facility] was in Sep 2023 and notable only for several angioectasias s/p APC.     Currently hemodynamically stable and afebrile. Labs notable for Hgb 8, , plat 143, Na 149, Cr 1.79, normal LFTs. CT with evidence of cirrhosis without portal hypertension,  2 cm indeterminate mass within the right upper quadrant small bowel mesentery and moderate stool burden.     12.13: more alert today, able to tolerate diet, ambulated w assistance  12.14: mentation normal, +dark stool, no cp, no n/v  12.15: mentation normal, no confusion, s/p EGD  no cp, no sob           REVIEW OF SYSTEMS:    CONSTITUTIONAL: No weakness, No fevers or chills  ENT: No ear ache, No sorethroat  NECK: No pain, No stiffness  RESPIRATORY: No cough, No wheezing, No hemoptysis; No dyspnea  CARDIOVASCULAR: No chest pain, No palpitations  GASTROINTESTINAL: No abd pain, No nausea, No vomiting, No hematemesis, No diarrhea or constipation. + melena, No hematochezia.  GENITOURINARY: No dysuria, No  hematuria  NEUROLOGICAL: No diplopia, No paresthesia, No motor dysfunction  MUSCULOSKELETAL: No arthralgia, No myalgia  SKIN: No rashes, or lesions   PSYCH: no anxiety, no suicidal ideation    All other review of systems is negative unless indicated above    Vital Signs Last 24 Hrs  T(C): 37.2 (15 Dec 2023 07:58), Max: 37.2 (15 Dec 2023 00:00)  T(F): 98.9 (15 Dec 2023 07:58), Max: 98.9 (15 Dec 2023 00:00)  HR: 72 (15 Dec 2023 07:58) (72 - 74)  BP: 110/42 (15 Dec 2023 07:58) (110/42 - 113/53)  RR: 18 (15 Dec 2023 07:58) (18 - 18)  SpO2: 99% (15 Dec 2023 07:58) (99% - 99%)    Parameters below as of 15 Dec 2023 07:58  Patient On (Oxygen Delivery Method): room air            PHYSICAL EXAM:    GENERAL: NAD  HEENT:  NC/AT, EOMI, PERRLA, No scleral icterus, Moist mucous membranes  NECK: Supple, No JVD  CNS:  Alert & Oriented X3, Motor Strength 5/5 B/L upper and lower extremities; DTRs 2+ intact   LUNG: Normal Breath sounds, Clear to auscultation bilaterally, No rales, No rhonchi, No wheezing  HEART: RRR; No murmurs, No rubs  ABDOMEN: +BS, ST/ND/NT  GENITOURINARY: Voiding, Bladder not distended  EXTREMITIES:  2+ Peripheral Pulses, No clubbing, No cyanosis, No tibial edema  MUSCULOSKELTAL: Joints normal ROM, No TTP, No effusion  SKIN: no rashes  RECTAL: deferred, not indicated  BREAST: deferred               Labs:                        7.2    4.35  )-----------( 102      ( 15 Dec 2023 08:02 )             21.2     12-15    139  |  113<H>  |  43<H>  ----------------------------<  114<H>  4.5   |  20<L>  |  1.64<H>    Ca    8.3<L>      15 Dec 2023 08:02        MEDICATIONS  (STANDING):  amLODIPine   Tablet 10 milliGRAM(s) Oral daily  ascorbic acid 500 milliGRAM(s) Oral daily  ferrous    sulfate 325 milliGRAM(s) Oral daily  folic acid 1 milliGRAM(s) Oral daily  ketorolac 0.5% Ophthalmic Solution 1 Drop(s) Both EYES three times a day  lactulose Syrup 20 Gram(s) Oral two times a day  LORazepam   Injectable 1 milliGRAM(s) IV Push once  montelukast 10 milliGRAM(s) Oral at bedtime  pantoprazole  Injectable 40 milliGRAM(s) IV Push every 12 hours  prednisoLONE acetate 1% Suspension 1 Drop(s) Both EYES three times a day  rifAXIMin 550 milliGRAM(s) Oral two times a day  sodium chloride 0.9%. 1000 milliLiter(s) (83 mL/Hr) IV Continuous <Continuous>      a/p:      1. Hepatic Encephalopathy: improved  due to medical noncompliance  restart Rifaximin, Lactulose Bid    2. Cirrhosis:  diuretic stopped due to BRENNAN; will restart low dose on d/c    3. h/o UGIB:  Anemia: of acute blood loss   s/p EGD: s/p cauterisation of AVMs  c/w PPI   transfuse another  1u RBC    4. 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 Patient poor historian so information gathered from charting.  Per chart, daughter reports patient at baseline is fully independent and does all ADLs/IADLs however this morning she could not wake him unless she violently jerked him. She states patient normally takes Rifaximin BID however he ran out of medication and [facility]. Patient last took Rifaximin on Saturday 12/9/23. In the ambulance bay patient coughed up blood which daughter states has never occurred before. [facility] was in Sep 2023 and notable only for several angioectasias s/p APC.     Currently hemodynamically stable and afebrile. Labs notable for Hgb 8, , plat 143, Na 149, Cr 1.79, normal LFTs. CT with evidence of cirrhosis without portal hypertension,  2 cm indeterminate mass within the right upper quadrant small bowel mesentery and moderate stool burden.     12.13: more alert today, able to tolerate diet, ambulated w assistance  12.14: mentation normal, +dark stool, no cp, no n/v  12.15: mentation normal, no confusion, s/p EGD  no cp, no sob           REVIEW OF SYSTEMS:    CONSTITUTIONAL: No weakness, No fevers or chills  ENT: No ear ache, No sorethroat  NECK: No pain, No stiffness  RESPIRATORY: No cough, No wheezing, No hemoptysis; No dyspnea  CARDIOVASCULAR: No chest pain, No palpitations  GASTROINTESTINAL: No abd pain, No nausea, No vomiting, No hematemesis, No diarrhea or constipation. + melena, No hematochezia.  GENITOURINARY: No dysuria, No  hematuria  NEUROLOGICAL: No diplopia, No paresthesia, No motor dysfunction  MUSCULOSKELETAL: No arthralgia, No myalgia  SKIN: No rashes, or lesions   PSYCH: no anxiety, no suicidal ideation    All other review of systems is negative unless indicated above    Vital Signs Last 24 Hrs  T(C): 37.2 (15 Dec 2023 07:58), Max: 37.2 (15 Dec 2023 00:00)  T(F): 98.9 (15 Dec 2023 07:58), Max: 98.9 (15 Dec 2023 00:00)  HR: 72 (15 Dec 2023 07:58) (72 - 74)  BP: 110/42 (15 Dec 2023 07:58) (110/42 - 113/53)  RR: 18 (15 Dec 2023 07:58) (18 - 18)  SpO2: 99% (15 Dec 2023 07:58) (99% - 99%)    Parameters below as of 15 Dec 2023 07:58  Patient On (Oxygen Delivery Method): room air            PHYSICAL EXAM:    GENERAL: NAD  HEENT:  NC/AT, EOMI, PERRLA, No scleral icterus, Moist mucous membranes  NECK: Supple, No JVD  CNS:  Alert & Oriented X3, Motor Strength 5/5 B/L upper and lower extremities; DTRs 2+ intact   LUNG: Normal Breath sounds, Clear to auscultation bilaterally, No rales, No rhonchi, No wheezing  HEART: RRR; No murmurs, No rubs  ABDOMEN: +BS, ST/ND/NT  GENITOURINARY: Voiding, Bladder not distended  EXTREMITIES:  2+ Peripheral Pulses, No clubbing, No cyanosis, No tibial edema  MUSCULOSKELTAL: Joints normal ROM, No TTP, No effusion  SKIN: no rashes  RECTAL: deferred, not indicated  BREAST: deferred               Labs:                        7.2    4.35  )-----------( 102      ( 15 Dec 2023 08:02 )             21.2     12-15    139  |  113<H>  |  43<H>  ----------------------------<  114<H>  4.5   |  20<L>  |  1.64<H>    Ca    8.3<L>      15 Dec 2023 08:02        MEDICATIONS  (STANDING):  amLODIPine   Tablet 10 milliGRAM(s) Oral daily  ascorbic acid 500 milliGRAM(s) Oral daily  ferrous    sulfate 325 milliGRAM(s) Oral daily  folic acid 1 milliGRAM(s) Oral daily  ketorolac 0.5% Ophthalmic Solution 1 Drop(s) Both EYES three times a day  lactulose Syrup 20 Gram(s) Oral two times a day  LORazepam   Injectable 1 milliGRAM(s) IV Push once  montelukast 10 milliGRAM(s) Oral at bedtime  pantoprazole  Injectable 40 milliGRAM(s) IV Push every 12 hours  prednisoLONE acetate 1% Suspension 1 Drop(s) Both EYES three times a day  rifAXIMin 550 milliGRAM(s) Oral two times a day  sodium chloride 0.9%. 1000 milliLiter(s) (83 mL/Hr) IV Continuous <Continuous>      a/p:      1. Hepatic Encephalopathy: improved  due to medical noncompliance  restart Rifaximin, Lactulose Bid    2. Cirrhosis:  diuretic stopped due to BRENNAN; will restart low dose on d/c    3. UGIB:  s/p EGD: s/p cauterisation of AVMs  c/w PPI     Anemia of acute blood loss   transfuse another  1u RBC    4. BRENNAN:  prerenal azotemia?: improving after diuretics stopped and volume replenished   stop diuretic for now     d/w GI  time 51 min

## 2023-12-16 VITALS
OXYGEN SATURATION: 100 % | DIASTOLIC BLOOD PRESSURE: 81 MMHG | RESPIRATION RATE: 20 BRPM | HEART RATE: 70 BPM | SYSTOLIC BLOOD PRESSURE: 114 MMHG

## 2023-12-16 LAB
ANION GAP SERPL CALC-SCNC: 7 MMOL/L — SIGNIFICANT CHANGE UP (ref 5–17)
ANION GAP SERPL CALC-SCNC: 7 MMOL/L — SIGNIFICANT CHANGE UP (ref 5–17)
BUN SERPL-MCNC: 33 MG/DL — HIGH (ref 7–23)
BUN SERPL-MCNC: 33 MG/DL — HIGH (ref 7–23)
CALCIUM SERPL-MCNC: 8.4 MG/DL — LOW (ref 8.5–10.1)
CALCIUM SERPL-MCNC: 8.4 MG/DL — LOW (ref 8.5–10.1)
CHLORIDE SERPL-SCNC: 114 MMOL/L — HIGH (ref 96–108)
CHLORIDE SERPL-SCNC: 114 MMOL/L — HIGH (ref 96–108)
CO2 SERPL-SCNC: 19 MMOL/L — LOW (ref 22–31)
CO2 SERPL-SCNC: 19 MMOL/L — LOW (ref 22–31)
CREAT SERPL-MCNC: 1.4 MG/DL — HIGH (ref 0.5–1.3)
CREAT SERPL-MCNC: 1.4 MG/DL — HIGH (ref 0.5–1.3)
EGFR: 51 ML/MIN/1.73M2 — LOW
EGFR: 51 ML/MIN/1.73M2 — LOW
GLUCOSE SERPL-MCNC: 99 MG/DL — SIGNIFICANT CHANGE UP (ref 70–99)
GLUCOSE SERPL-MCNC: 99 MG/DL — SIGNIFICANT CHANGE UP (ref 70–99)
HCT VFR BLD CALC: 24.8 % — LOW (ref 39–50)
HCT VFR BLD CALC: 24.8 % — LOW (ref 39–50)
HGB BLD-MCNC: 8.4 G/DL — LOW (ref 13–17)
HGB BLD-MCNC: 8.4 G/DL — LOW (ref 13–17)
MCHC RBC-ENTMCNC: 33.9 GM/DL — SIGNIFICANT CHANGE UP (ref 32–36)
MCHC RBC-ENTMCNC: 33.9 GM/DL — SIGNIFICANT CHANGE UP (ref 32–36)
MCHC RBC-ENTMCNC: 34.9 PG — HIGH (ref 27–34)
MCHC RBC-ENTMCNC: 34.9 PG — HIGH (ref 27–34)
MCV RBC AUTO: 102.9 FL — HIGH (ref 80–100)
MCV RBC AUTO: 102.9 FL — HIGH (ref 80–100)
PLATELET # BLD AUTO: 105 K/UL — LOW (ref 150–400)
PLATELET # BLD AUTO: 105 K/UL — LOW (ref 150–400)
POTASSIUM SERPL-MCNC: 4.6 MMOL/L — SIGNIFICANT CHANGE UP (ref 3.5–5.3)
POTASSIUM SERPL-MCNC: 4.6 MMOL/L — SIGNIFICANT CHANGE UP (ref 3.5–5.3)
POTASSIUM SERPL-SCNC: 4.6 MMOL/L — SIGNIFICANT CHANGE UP (ref 3.5–5.3)
POTASSIUM SERPL-SCNC: 4.6 MMOL/L — SIGNIFICANT CHANGE UP (ref 3.5–5.3)
RBC # BLD: 2.41 M/UL — LOW (ref 4.2–5.8)
RBC # BLD: 2.41 M/UL — LOW (ref 4.2–5.8)
RBC # FLD: 19.6 % — HIGH (ref 10.3–14.5)
RBC # FLD: 19.6 % — HIGH (ref 10.3–14.5)
SODIUM SERPL-SCNC: 140 MMOL/L — SIGNIFICANT CHANGE UP (ref 135–145)
SODIUM SERPL-SCNC: 140 MMOL/L — SIGNIFICANT CHANGE UP (ref 135–145)
WBC # BLD: 3.16 K/UL — LOW (ref 3.8–10.5)
WBC # BLD: 3.16 K/UL — LOW (ref 3.8–10.5)
WBC # FLD AUTO: 3.16 K/UL — LOW (ref 3.8–10.5)
WBC # FLD AUTO: 3.16 K/UL — LOW (ref 3.8–10.5)

## 2023-12-16 PROCEDURE — 99239 HOSP IP/OBS DSCHRG MGMT >30: CPT

## 2023-12-16 RX ORDER — FUROSEMIDE 40 MG
1 TABLET ORAL
Refills: 0 | DISCHARGE

## 2023-12-16 RX ORDER — FUROSEMIDE 40 MG
1 TABLET ORAL
Qty: 30 | Refills: 0
Start: 2023-12-16

## 2023-12-16 RX ORDER — SPIRONOLACTONE 25 MG/1
1 TABLET, FILM COATED ORAL
Refills: 0 | DISCHARGE

## 2023-12-16 RX ORDER — FERROUS SULFATE 325(65) MG
1 TABLET ORAL
Qty: 30 | Refills: 0
Start: 2023-12-16

## 2023-12-16 RX ORDER — SPIRONOLACTONE 25 MG/1
1 TABLET, FILM COATED ORAL
Qty: 30 | Refills: 0
Start: 2023-12-16

## 2023-12-16 RX ADMIN — Medication 1 DROP(S): at 05:02

## 2023-12-16 RX ADMIN — Medication 500 MILLIGRAM(S): at 08:35

## 2023-12-16 RX ADMIN — Medication 1 MILLIGRAM(S): at 08:35

## 2023-12-16 RX ADMIN — Medication 1 DROP(S): at 05:13

## 2023-12-16 RX ADMIN — AMLODIPINE BESYLATE 10 MILLIGRAM(S): 2.5 TABLET ORAL at 08:35

## 2023-12-16 RX ADMIN — Medication 325 MILLIGRAM(S): at 08:34

## 2023-12-16 RX ADMIN — PANTOPRAZOLE SODIUM 40 MILLIGRAM(S): 20 TABLET, DELAYED RELEASE ORAL at 08:35

## 2023-12-16 RX ADMIN — LACTULOSE 20 GRAM(S): 10 SOLUTION ORAL at 08:33

## 2023-12-16 NOTE — DISCHARGE NOTE PROVIDER - NSDCCPCAREPLAN_GEN_ALL_CORE_FT
PRINCIPAL DISCHARGE DIAGNOSIS  Diagnosis: Hepatic encephalopathy  Assessment and Plan of Treatment:       SECONDARY DISCHARGE DIAGNOSES  Diagnosis: GI bleed  Assessment and Plan of Treatment: AVM cauterized

## 2023-12-16 NOTE — PROGRESS NOTE ADULT - ASSESSMENT
1. Hepatic encephalopathy  2. Cirrhosis  3. Macrocytic anemia. Do not suspect brisk variceal bleed as no evidence of PH on CT and recent EGD unremarkable  4. Hypernatremia  6. 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 Continue Rifaximin 550 mg BID and lactulose 20 mg BID with goal 3-4 bowel movements daily  2. Check B12 and folate  3. Plan for EGD on 12/15.   4. NPO after mindight  5. Continue PPI  6. Monitor for overt bleeding and tranfuse for hgb < 7  7. Hold diuretics at this time.   8. Consider MRI abdomen for further evaluation of mesentary lesion
1. Hepatic encephalopathy resolved  2. Cirrhosis  3. UGIB 2/2 to bleeding avms s/p cautery  4. Hypernatremia resolved  5. 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 Continue Rifaximin 550 mg BID and lactulose 20 mg BID with goal 3-4 bowel movements daily  2. PPI 40 mg PO daily  3. Okay to restart diuretics  4. Advance diet as tolerated  5. Recommend outpatient MRI abdomen for further evaluation of mesentary lesion  6. [facility]  7. No contraindications to discharge

## 2023-12-16 NOTE — DISCHARGE NOTE NURSING/CASE MANAGEMENT/SOCIAL WORK - NSDCPEFALRISK_GEN_ALL_CORE
For information on Fall & Injury Prevention, visit: [email or web address] OR  [email or web address] OR  https://www.cdc.gov/steadi/patient.html For information on Fall & Injury Prevention, visit: [email or web address]-[email or web address] OR  [email or web address]/[email or web address]-[email or web address]-[email or web address]-avoid-injury OR  https://www.cdc.gov/steadi/patient.html

## 2023-12-16 NOTE — PROGRESS NOTE ADULT - SUBJECTIVE AND OBJECTIVE BOX
Patient is a 78y old  Male who presents with a chief complaint of AMS (15 Dec 2023 15:53)      Subective: Seen and examined at bedside. No overnight events. Denies abdominal pain, nausea vomiting , fever or chills. Tolerating diet.       PAST MEDICAL & SURGICAL HISTORY:  HTN (hypertension)      Hepatic encephalopathy          MEDICATIONS  (STANDING):  amLODIPine   Tablet 10 milliGRAM(s) Oral daily  ascorbic acid 500 milliGRAM(s) Oral daily  ferrous    sulfate 325 milliGRAM(s) Oral daily  folic acid 1 milliGRAM(s) Oral daily  ketorolac 0.5% Ophthalmic Solution 1 Drop(s) Both EYES three times a day  lactulose Syrup 20 Gram(s) Oral two times a day  LORazepam   Injectable 1 milliGRAM(s) IV Push once  montelukast 10 milliGRAM(s) Oral at bedtime  pantoprazole  Injectable 40 milliGRAM(s) IV Push every 12 hours  prednisoLONE acetate 1% Suspension 1 Drop(s) Both EYES three times a day  rifAXIMin 550 milliGRAM(s) Oral two times a day  sodium chloride 0.9%. 1000 milliLiter(s) (83 mL/Hr) IV Continuous <Continuous>    MEDICATIONS  (PRN):  acetaminophen     Tablet .. 650 milliGRAM(s) Oral every 6 hours PRN Mild Pain (1 - 3)  aluminum hydroxide/magnesium hydroxide/simethicone Suspension 30 milliLiter(s) Oral every 4 hours PRN Dyspepsia  LORazepam   Injectable 1 milliGRAM(s) IV Push once PRN Agitation  melatonin 3 milliGRAM(s) Oral at bedtime PRN Insomnia  ondansetron Injectable 4 milliGRAM(s) IV Push every 6 hours PRN Nausea and/or Vomiting      REVIEW OF SYSTEMS:    RESPIRATORY: No shortness of breath  CARDIOVASCULAR: No chest pain  All other review of systems is negative unless indicated above.    Vital Signs Last 24 Hrs  T(C): 36.7 (16 Dec 2023 07:51), Max: 36.8 (15 Dec 2023 17:11)  T(F): 98.1 (16 Dec 2023 07:51), Max: 98.2 (15 Dec 2023 17:11)  HR: 70 (16 Dec 2023 08:27) (60 - 74)  BP: 114/81 (16 Dec 2023 08:27) (104/40 - 116/56)  BP(mean): --  RR: 20 (16 Dec 2023 08:27) (18 - 20)  SpO2: 100% (16 Dec 2023 08:27) (98% - 100%)    Parameters below as of 16 Dec 2023 08:27  Patient On (Oxygen Delivery Method): room air        PHYSICAL EXAM:    Constitutional: NAD, well-developed  Respiratory: CTAB  Cardiovascular: S1 and S2, RRR  Gastrointestinal: BS+, soft, NT/ND  Extremities: No peripheral edema  Psychiatric: Normal mood, normal affect    LABS:                        8.4    3.16  )-----------( 105      ( 16 Dec 2023 06:59 )             24.8     12-16    140  |  114<H>  |  33<H>  ----------------------------<  99  4.6   |  19<L>  |  1.40<H>    Ca    8.4<L>      16 Dec 2023 06:59            RADIOLOGY & ADDITIONAL STUDIES:

## 2023-12-16 NOTE — DISCHARGE NOTE NURSING/CASE MANAGEMENT/SOCIAL WORK - PATIENT PORTAL LINK FT
You can access the FollowMyHealth Patient Portal offered by [facility] by registering at the following website: [email or web address]. By joining [employer]’s FollowMyHealth portal, you will also be able to view your health information using other applications (apps) compatible with our system. You can access the FollowMyHealth Patient Portal offered by [facility] by registering at the following website: [email or web address]. By joining [employer]’s FollowMyHealth portal, you will also be able to view your health information using other applications (apps) compatible with our system.

## 2023-12-16 NOTE — DISCHARGE NOTE PROVIDER - NSDCMRMEDTOKEN_GEN_ALL_CORE_FT
amLODIPine 10 mg oral tablet: 1 tab(s) orally once a day  ascorbic acid 500 mg oral tablet: 1 tab(s) orally once a day  ferrous sulfate 325 mg (65 mg elemental iron) oral tablet: 1 tab(s) orally once a day  folic acid 1 mg oral tablet: 1 tab(s) orally once a day  ketorolac 0.5% ophthalmic solution: 1 drop(s) in the left eye 3 times a day ***pt was supposed to go to eye doc on Thursday- just had surgery***  lactulose 10 g/15 mL oral and rectal liquid: 10 gram(s) orally once a day  Lasix 20 mg oral tablet: 1 tab(s) orally  loratadine 10 mg oral tablet: 1 tab(s) orally once a day  montelukast 10 mg oral tablet: 1 tab(s) orally once a day  Multiple Vitamins oral tablet: 1 tab(s) orally 2 times a day  pantoprazole 40 mg oral delayed release tablet: 1 tab(s) orally 2 times a day  prednisoLONE acetate 1% ophthalmic suspension: 1 drop(s) in the left eye 3 times a day ***pt was supposed to go to eye doc on Thursday- just had surgery***  rifAXIMin 550 mg oral tablet: 1 tab(s) orally 2 times a day  spironolactone 50 mg oral tablet: 1 tab(s) orally

## 2023-12-16 NOTE — DISCHARGE NOTE PROVIDER - HOSPITAL COURSE
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 Patient poor historian so information gathered from charting.  Per chart, daughter reports patient at baseline is fully independent and does all ADLs/IADLs however this morning she could not wake him unless she violently jerked him. She states patient normally takes Rifaximin BID however he [facility] out of [facility]. Patient last took Rifaximin on Saturday 12/9/23. In the ambulance bay patient coughed up blood which daughter states has never occurred before. [facility] his last EGD was in Sep 2023 and notable only for several angioectasias s/p APC.     Currently hemodynamically stable and afebrile. Labs notable for Hgb 8, , plat 143, Na 149, Cr 1.79, normal LFTs. CT with evidence of cirrhosis without portal hypertension,  2 cm indeterminate mass within the right upper quadrant small bowel mesentery and moderate stool burden.     12.13: more alert today, able to tolerate diet, ambulated w assistance  12.14: mentation normal, +dark stool, no cp, no n/v  12.15: mentation normal, no confusion, s/p EGD  no cp, no sob   12.16: doing well, no melena, no bleeding, no abd pain        REVIEW OF SYSTEMS:    CONSTITUTIONAL: No weakness, No fevers or chills  ENT: No ear ache, No sorethroat  NECK: No pain, No stiffness  RESPIRATORY: No cough, No wheezing, No hemoptysis; No dyspnea  CARDIOVASCULAR: No chest pain, No palpitations  GASTROINTESTINAL: No abd pain, No nausea, No vomiting, No hematemesis, No diarrhea or constipation. + melena, No hematochezia.  GENITOURINARY: No dysuria, No  hematuria  NEUROLOGICAL: No diplopia, No paresthesia, No motor dysfunction  MUSCULOSKELETAL: No arthralgia, No myalgia  SKIN: No rashes, or lesions   PSYCH: no anxiety, no suicidal ideation    All other review of systems is negative unless indicated above    Vital Signs Last 24 Hrs  T(C): 36.7 (16 Dec 2023 07:51), Max: 36.8 (15 Dec 2023 17:11)  T(F): 98.1 (16 Dec 2023 07:51), Max: 98.2 (15 Dec 2023 17:11)  HR: 70 (16 Dec 2023 08:27) (60 - 74)  BP: 114/81 (16 Dec 2023 08:27) (104/40 - 116/56)  RR: 20 (16 Dec 2023 08:27) (18 - 20)  SpO2: 100% (16 Dec 2023 08:27) (98% - 100%)    Parameters below as of 16 Dec 2023 08:27  Patient On (Oxygen Delivery Method): room air      PHYSICAL EXAM:    GENERAL: NAD  HEENT:  NC/AT, EOMI, PERRLA, No scleral icterus, Moist mucous membranes  NECK: Supple, No JVD  CNS:  Alert & Oriented X3, Motor Strength 5/5 B/L upper and lower extremities; DTRs 2+ intact   LUNG: Normal Breath sounds, Clear to auscultation bilaterally, No rales, No rhonchi, No wheezing  HEART: RRR; No murmurs, No rubs  ABDOMEN: +BS, ST/ND/NT  GENITOURINARY: Voiding, Bladder not distended  EXTREMITIES:  2+ Peripheral Pulses, No clubbing, No cyanosis, No tibial edema  MUSCULOSKELTAL: Joints normal ROM, No TTP, No effusion  SKIN: no rashes  RECTAL: deferred, not indicated  BREAST: deferred      a/p:      1. Hepatic Encephalopathy: improved  due to medical noncompliance  restart Rifaximin, Lactulose Bid    2. Cirrhosis:  diuretic stopped due to BRENNAN; will restart low dose on d/c    3. UGIB due to AVMs:  s/p EGD: s/p cauterisation of AVMs  c/w PPI     Anemia of acute blood loss   s/p 2u RBC , Hb stable 8.4 today, no evidence of bleeding  advanced diet   GI f/u as outpatient     4. BRENNAN:  prerenal azotemia?: improving after diuretics stopped and volume replenished   stop diuretic for now     d/w GI

## 2023-12-17 LAB
CULTURE RESULTS: SIGNIFICANT CHANGE UP
SPECIMEN SOURCE: SIGNIFICANT CHANGE UP

## 2023-12-28 DIAGNOSIS — R93.3 ABNORMAL FINDINGS ON DIAGNOSTIC IMAGING OF OTHER PARTS OF DIGESTIVE TRACT: ICD-10-CM

## 2023-12-28 DIAGNOSIS — K31.811 ANGIODYSPLASIA OF STOMACH AND DUODENUM WITH BLEEDING: ICD-10-CM

## 2023-12-28 DIAGNOSIS — I10 ESSENTIAL (PRIMARY) HYPERTENSION: ICD-10-CM

## 2023-12-28 DIAGNOSIS — Z91.148 PATIENT'S OTHER NONCOMPLIANCE WITH MEDICATION REGIMEN FOR OTHER REASON: ICD-10-CM

## 2023-12-28 DIAGNOSIS — E87.0 HYPEROSMOLALITY AND HYPERNATREMIA: ICD-10-CM

## 2023-12-28 DIAGNOSIS — K72.10 CHRONIC HEPATIC FAILURE WITHOUT COMA: ICD-10-CM

## 2023-12-28 DIAGNOSIS — E44.0 MODERATE PROTEIN-CALORIE MALNUTRITION: ICD-10-CM

## 2023-12-28 DIAGNOSIS — K74.69 OTHER CIRRHOSIS OF LIVER: ICD-10-CM

## 2023-12-28 DIAGNOSIS — Z57.5 OCCUPATIONAL EXPOSURE TO TOXIC AGENTS IN OTHER INDUSTRIES: ICD-10-CM

## 2023-12-28 DIAGNOSIS — Z79.899 OTHER LONG TERM (CURRENT) DRUG THERAPY: ICD-10-CM

## 2023-12-28 DIAGNOSIS — D62 ACUTE POSTHEMORRHAGIC ANEMIA: ICD-10-CM

## 2023-12-28 DIAGNOSIS — Z91.018 ALLERGY TO OTHER FOODS: ICD-10-CM

## 2023-12-28 DIAGNOSIS — K76.82 HEPATIC ENCEPHALOPATHY: ICD-10-CM

## 2023-12-28 DIAGNOSIS — Z88.0 ALLERGY STATUS TO PENICILLIN: ICD-10-CM

## 2023-12-28 DIAGNOSIS — N17.9 ACUTE KIDNEY FAILURE, UNSPECIFIED: ICD-10-CM

## 2023-12-28 DIAGNOSIS — E72.20 DISORDER OF UREA CYCLE METABOLISM, UNSPECIFIED: ICD-10-CM

## 2023-12-28 SDOH — HEALTH STABILITY - PHYSICAL HEALTH: OCCUPATIONAL EXPOSURE TO TOXIC AGENTS IN OTHER INDUSTRIES: Z57.5

## 2024-09-06 ENCOUNTER — INPATIENT (INPATIENT)
Facility: HOSPITAL | Age: 79
LOS: 2 days | Discharge: HOME | DRG: 812 | End: 2024-09-09
Attending: HOSPITALIST | Admitting: FAMILY MEDICINE
Payer: OTHER GOVERNMENT

## 2024-09-06 VITALS
DIASTOLIC BLOOD PRESSURE: 62 MMHG | WEIGHT: 188.05 LBS | HEIGHT: 61 IN | SYSTOLIC BLOOD PRESSURE: 110 MMHG | HEART RATE: 77 BPM | OXYGEN SATURATION: 97 % | TEMPERATURE: 98 F | RESPIRATION RATE: 20 BRPM

## 2024-09-06 DIAGNOSIS — D64.9 ANEMIA, UNSPECIFIED: ICD-10-CM

## 2024-09-06 PROBLEM — K76.82 HEPATIC ENCEPHALOPATHY: Chronic | Status: ACTIVE | Noted: 2023-12-12

## 2024-09-06 LAB
ALBUMIN SERPL ELPH-MCNC: 2.9 G/DL — LOW (ref 3.3–5)
ALP SERPL-CCNC: 105 U/L — SIGNIFICANT CHANGE UP (ref 40–120)
ALT FLD-CCNC: 23 U/L — SIGNIFICANT CHANGE UP (ref 12–78)
ANION GAP SERPL CALC-SCNC: 6 MMOL/L — SIGNIFICANT CHANGE UP (ref 5–17)
APTT BLD: 29 SEC — SIGNIFICANT CHANGE UP (ref 24.5–35.6)
AST SERPL-CCNC: 51 U/L — HIGH (ref 15–37)
BILIRUB SERPL-MCNC: 1 MG/DL — SIGNIFICANT CHANGE UP (ref 0.2–1.2)
BLD GP AB SCN SERPL QL: SIGNIFICANT CHANGE UP
BUN SERPL-MCNC: 51 MG/DL — HIGH (ref 7–23)
CALCIUM SERPL-MCNC: 9.5 MG/DL — SIGNIFICANT CHANGE UP (ref 8.5–10.1)
CHLORIDE SERPL-SCNC: 104 MMOL/L — SIGNIFICANT CHANGE UP (ref 96–108)
CO2 SERPL-SCNC: 22 MMOL/L — SIGNIFICANT CHANGE UP (ref 22–31)
CREAT SERPL-MCNC: 1.51 MG/DL — HIGH (ref 0.5–1.3)
EGFR: 46 ML/MIN/1.73M2 — LOW
GLUCOSE SERPL-MCNC: 106 MG/DL — HIGH (ref 70–99)
HCT VFR BLD CALC: 19.9 % — CRITICAL LOW (ref 39–50)
HGB BLD-MCNC: 6.2 G/DL — CRITICAL LOW (ref 13–17)
INR BLD: 1.12 RATIO — SIGNIFICANT CHANGE UP (ref 0.85–1.18)
MCHC RBC-ENTMCNC: 31.2 GM/DL — LOW (ref 32–36)
MCHC RBC-ENTMCNC: 34.1 PG — HIGH (ref 27–34)
MCV RBC AUTO: 109.3 FL — HIGH (ref 80–100)
PLATELET # BLD AUTO: 153 K/UL — SIGNIFICANT CHANGE UP (ref 150–400)
POTASSIUM SERPL-MCNC: 5.2 MMOL/L — SIGNIFICANT CHANGE UP (ref 3.5–5.3)
POTASSIUM SERPL-SCNC: 5.2 MMOL/L — SIGNIFICANT CHANGE UP (ref 3.5–5.3)
PROT SERPL-MCNC: 6.9 GM/DL — SIGNIFICANT CHANGE UP (ref 6–8.3)
PROTHROM AB SERPL-ACNC: 12.6 SEC — SIGNIFICANT CHANGE UP (ref 9.5–13)
RBC # BLD: 1.82 M/UL — LOW (ref 4.2–5.8)
RBC # FLD: 15.8 % — HIGH (ref 10.3–14.5)
SODIUM SERPL-SCNC: 132 MMOL/L — LOW (ref 135–145)
TROPONIN I, HIGH SENSITIVITY RESULT: 11.01 NG/L — SIGNIFICANT CHANGE UP
TROPONIN I, HIGH SENSITIVITY RESULT: 11.35 NG/L — SIGNIFICANT CHANGE UP
WBC # BLD: 5.68 K/UL — SIGNIFICANT CHANGE UP (ref 3.8–10.5)
WBC # FLD AUTO: 5.68 K/UL — SIGNIFICANT CHANGE UP (ref 3.8–10.5)

## 2024-09-06 PROCEDURE — 85025 COMPLETE CBC W/AUTO DIFF WBC: CPT

## 2024-09-06 PROCEDURE — 99291 CRITICAL CARE FIRST HOUR: CPT

## 2024-09-06 PROCEDURE — 83735 ASSAY OF MAGNESIUM: CPT

## 2024-09-06 PROCEDURE — 80048 BASIC METABOLIC PNL TOTAL CA: CPT

## 2024-09-06 PROCEDURE — 36415 COLL VENOUS BLD VENIPUNCTURE: CPT

## 2024-09-06 PROCEDURE — 36430 TRANSFUSION BLD/BLD COMPNT: CPT

## 2024-09-06 PROCEDURE — 83615 LACTATE (LD) (LDH) ENZYME: CPT

## 2024-09-06 PROCEDURE — 83540 ASSAY OF IRON: CPT

## 2024-09-06 PROCEDURE — 85027 COMPLETE CBC AUTOMATED: CPT

## 2024-09-06 PROCEDURE — 83550 IRON BINDING TEST: CPT

## 2024-09-06 PROCEDURE — 71045 X-RAY EXAM CHEST 1 VIEW: CPT | Mod: 26

## 2024-09-06 PROCEDURE — 82728 ASSAY OF FERRITIN: CPT

## 2024-09-06 PROCEDURE — 93005 ELECTROCARDIOGRAM TRACING: CPT

## 2024-09-06 PROCEDURE — 83036 HEMOGLOBIN GLYCOSYLATED A1C: CPT

## 2024-09-06 PROCEDURE — 99222 1ST HOSP IP/OBS MODERATE 55: CPT

## 2024-09-06 PROCEDURE — 85045 AUTOMATED RETICULOCYTE COUNT: CPT

## 2024-09-06 PROCEDURE — 82746 ASSAY OF FOLIC ACID SERUM: CPT

## 2024-09-06 PROCEDURE — 80053 COMPREHEN METABOLIC PANEL: CPT

## 2024-09-06 PROCEDURE — P9016: CPT

## 2024-09-06 PROCEDURE — 86923 COMPATIBILITY TEST ELECTRIC: CPT

## 2024-09-06 PROCEDURE — 93010 ELECTROCARDIOGRAM REPORT: CPT

## 2024-09-06 PROCEDURE — 82607 VITAMIN B-12: CPT

## 2024-09-06 PROCEDURE — 82962 GLUCOSE BLOOD TEST: CPT

## 2024-09-06 RX ORDER — METOPROLOL TARTRATE 100 MG/1
1 TABLET ORAL
Refills: 0 | DISCHARGE

## 2024-09-06 RX ORDER — SPIRONOLACTONE 25 MG/1
1 TABLET, FILM COATED ORAL
Refills: 0 | DISCHARGE

## 2024-09-06 RX ORDER — FUROSEMIDE 40 MG
1 TABLET ORAL
Refills: 0 | DISCHARGE

## 2024-09-06 RX ADMIN — Medication 100 GRAM(S): at 16:40

## 2024-09-06 NOTE — ED ADULT TRIAGE NOTE - CHIEF COMPLAINT QUOTE
Pt presents to er with complaints of having a witnessed syncopal episode while walking, injured his left elbow, denies head strike, use of blood thinners, hr decreasing into the 40's. Pt denies chest pain, sob.

## 2024-09-06 NOTE — H&P ADULT - NSHPREVIEWOFSYSTEMS_GEN_ALL_CORE
HPI from pt, pt's daughter and pt[facility] Physician [facility]:    	79yoM with hepatic encephalopathy from agent orange as a [place] , HTN, recent diagnosis of afib not on AC, abdominal telengiectasias, hx of syncope due to GIB and transfusions coming to ED for evaluation of syncopal episode. Pt reports he was walking at a store with his daughter, felt dizzy/lightheaded followed by syncopal event. Did not strike head, daughter caught his head before the fall. No rectal bleeding or dark stools. No AC or AP use.     	Denies CP, SOB, ab pain, urinary sx's, URI sx's, fevers, chills.    	Last Tdap within the past year.

## 2024-09-06 NOTE — ED PROVIDER NOTE - PHYSICAL EXAMINATION
Constitutional: NAD AAOx3  Eyes: EOMI, pupils equal  Head: Normocephalic atraumatic  Mouth: no airway obstruction  Cardiac: regular rate   Resp: Lungs CTAB  GI: Abd s/nt/nd  Skin: pallor noted

## 2024-09-06 NOTE — ED ADULT NURSE NOTE - AVIAN FLU EXPOSURE
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 Little change from last time. Opening items are still difficult. Have been trying to do more such as doing dishes, helping with laundry.      OBJECTIVE                                                                                                                                     Treatment     Therapeutic Exercise          Neuromuscular Re-Education  Finger abduction/adduction  MP flexion/extension assisted  Blocked MP flexion with PIP/DIP flexion/extension  MP flexion to hook fist  Hook fist  Opposition patterns  Tan theraputty. Gripping alternating hands, palmar pinch, opposition putty balls, log rolling verbal and tactile cues needed for quality of movement with thumbs  Right lateral pinch with paper with emphasis on not flexing the IP joint.      Skilled input: tactile instruction/cues    Writer verbally educated and received verbal consent for hand placement, positioning of patient, and techniques to be performed today from patient for therapist position for techniques as described above and how they are pertinent to the patient's plan of care.  Home Exercise Program  Finger abd/adduction  Finger flexion/extension on Kleenex grading amount of flexion  Lifting forearm/hand off table holding 3 seconds with emphasis on keeping digits and wrist straight  Using small ball lifting off table  Assisted retroposition with right thumb  Lumbrical position  Ulnar nerve glide 1-2  Rotating cube in hand  Using light cylinder for strengthening to  wrists  Trial robert strapping  Taylor putty, alternating gripping, palmar pinch opposition putty balls, lateral pinch      ASSESSMENT                                                                                                            Patient continues to make slow gains. Did note improvement in opposition patterns with the left better than the right. He does fatigue easily with repetition. He does better with forearms supported on the table. Continued skilled OT is recommended to progress with neuro re-ed to allow pt to resume his daily activities.  Education:   - Results of above outlined education: Verbalizes understanding    PLAN                                                                                                                           Suggestions for next session as indicated: Progress per plan of care. Hand strengthening/coordination       Therapy procedure time and total treatment time can be found documented on the Time Entry flowsheet   No

## 2024-09-06 NOTE — ED PROVIDER NOTE - CLINICAL SUMMARY MEDICAL DECISION MAKING FREE TEXT BOX
will obtain labs, ECG, CXR, admit for high risk syncope.    Labs with Hb of 6.2-->2 units prbc's given.  ECG with afib--disagree with reading of prolonged QTc.    Case discussed w/ hospitalist, admitted to their service for ongoing care.

## 2024-09-06 NOTE — ED ADULT NURSE NOTE - HIV OFFER
Left pt a vm to go oa questionnaire ref in chart
pt is scheduled at [facility] with dr Genesis Yu on 11/18/19 for oa colon, I went over miralax on phone with pt and mailed out to pt home  Patient is aware she will need a  to and from   She will get a call the day before with an exact time for arrival 
Previously Declined (within the last year)

## 2024-09-06 NOTE — ED ADULT NURSE NOTE - NSFALLHARMRISKINTERV_ED_ALL_ED
Assistance OOB with selected safe patient handling equipment if applicable/Assistance with ambulation/Communicate risk of Fall with Harm to all staff, patient, and family/Monitor gait and stability/Provide visual cue: red socks, yellow wristband, yellow gown, etc/Reinforce activity limits and safety measures with patient and family/Bed in lowest position, wheels locked, appropriate side rails in place/Call bell, personal items and telephone in reach/Instruct patient to call for assistance before getting out of bed/chair/stretcher/Non-slip footwear applied when patient is off stretcher/[place] to call system/Physically safe environment - no spills, clutter or unnecessary equipment/Purposeful Proactive Rounding/Room/bathroom lighting operational, light cord in reach

## 2024-09-06 NOTE — H&P ADULT - NSHPPHYSICALEXAM_GEN_ALL_CORE
ICU Vital Signs Last 24 Hrs  T(C): 36.6 (06 Sep 2024 22:25), Max: 36.7 (06 Sep 2024 13:57)  T(F): 97.9 (06 Sep 2024 22:25), Max: 98.1 (06 Sep 2024 13:57)  HR: 54 (06 Sep 2024 22:25) (54 - 77)  BP: 115/54 (06 Sep 2024 22:25) (105/43 - 121/86)  BP(mean): 72 (06 Sep 2024 19:13) (72 - 72)  ABP: --  ABP(mean): --  RR: 18 (06 Sep 2024 22:25) (17 - 20)  SpO2: 100% (06 Sep 2024 22:25) (97% - 100%)    O2 Parameters below as of 06 Sep 2024 22:25  Patient On (Oxygen Delivery Method): room air

## 2024-09-06 NOTE — PATIENT PROFILE ADULT - FALL HARM RISK - HARM RISK INTERVENTIONS

## 2024-09-06 NOTE — H&P ADULT - CONVERSATION DETAILS
Pt is Full Code with no limitations on resuscitation.    Pt's daughter , Светлана Wang at bedside is HCP.

## 2024-09-06 NOTE — PATIENT PROFILE ADULT - HEALTH LITERACY
LM for patient informing her of normal test results per Dr. Wilson. RN instructed patient to call the office back with any further questions or concerns.    no

## 2024-09-06 NOTE — PATIENT PROFILE ADULT - FUNCTIONAL ASSESSMENT - BASIC MOBILITY 6.
3-calculated by average/Not able to assess (calculate score using [facility] averaging method)

## 2024-09-06 NOTE — H&P ADULT - ASSESSMENT
Pt is admitted w/    Symptomatic Anemia  Presyncope  Dizziness and collapse    Recent hx of atrial fibrillation, > 1 month  Chads vasc of at least 3  Hx of hepatic encephalopathy  Hx of allergies  Hx of Hepatic Disease, family report due to Agent Orange     - s/p 2 units PRBCS started in the ED  - check stool occult blood  - check CBC post transfusion  - Continue lasix, metoprolol  - cont lactulose, Rifaxamin  - pt and daughter counseled re: risk of stroke/morbidity  without anticoagulation.  - pt and daughter declines eliquis, heparin, xaralto, heparin, etc for CVA prevention  - [facility], [facility] consult  - DVT proph lovenox  - Full Code

## 2024-09-06 NOTE — H&P ADULT - HISTORY OF PRESENT ILLNESS
Pt is a pleasant 80yo M with hepatic encephalopathy from agent orange as a [place] , HTN, recent diagnosis of afib not on AC, abdominal telengiectasias, hx of syncope due to GIB and transfusions coming to [place]  ED for evaluation of pre syncope.   Pt reports he was walking at a store with his daughter, felt dizzy/lightheaded and collapsed without LOC.  Pt's  daughter who witnessed the incident,  reports he did not strike his head, as daughter caught his head before the fall.   Pt has had dizziness with bending over during the last 3-4 days.   No rectal bleeding or dark stools. No AC or AP use.   Pt denies CP, SOB, ab pain, no n/v/d, no urinary sx's, URI sx's, no fevers, chills, no edema.

## 2024-09-06 NOTE — ED ADULT NURSE NOTE - OBJECTIVE STATEMENT
Pt bibems s/p witnessed syncopal episode while walking. skin tear to L elbow. -head strike -blood thinners,

## 2024-09-07 LAB
ANION GAP SERPL CALC-SCNC: 8 MMOL/L — SIGNIFICANT CHANGE UP (ref 5–17)
BUN SERPL-MCNC: 46 MG/DL — HIGH (ref 7–23)
CALCIUM SERPL-MCNC: 9.4 MG/DL — SIGNIFICANT CHANGE UP (ref 8.5–10.1)
CHLORIDE SERPL-SCNC: 108 MMOL/L — SIGNIFICANT CHANGE UP (ref 96–108)
CO2 SERPL-SCNC: 22 MMOL/L — SIGNIFICANT CHANGE UP (ref 22–31)
CREAT SERPL-MCNC: 1.4 MG/DL — HIGH (ref 0.5–1.3)
EGFR: 51 ML/MIN/1.73M2 — LOW
GLUCOSE BLDC GLUCOMTR-MCNC: 102 MG/DL — HIGH (ref 70–99)
GLUCOSE BLDC GLUCOMTR-MCNC: 113 MG/DL — HIGH (ref 70–99)
GLUCOSE BLDC GLUCOMTR-MCNC: 124 MG/DL — HIGH (ref 70–99)
GLUCOSE BLDC GLUCOMTR-MCNC: 137 MG/DL — HIGH (ref 70–99)
GLUCOSE SERPL-MCNC: 104 MG/DL — HIGH (ref 70–99)
HCT VFR BLD CALC: 24.9 % — LOW (ref 39–50)
HCT VFR BLD CALC: 25.9 % — LOW (ref 39–50)
HGB BLD-MCNC: 8.1 G/DL — LOW (ref 13–17)
HGB BLD-MCNC: 8.3 G/DL — LOW (ref 13–17)
MCHC RBC-ENTMCNC: 31.9 PG — SIGNIFICANT CHANGE UP (ref 27–34)
MCHC RBC-ENTMCNC: 32 GM/DL — SIGNIFICANT CHANGE UP (ref 32–36)
MCHC RBC-ENTMCNC: 32.1 PG — SIGNIFICANT CHANGE UP (ref 27–34)
MCHC RBC-ENTMCNC: 32.5 GM/DL — SIGNIFICANT CHANGE UP (ref 32–36)
MCV RBC AUTO: 98.8 FL — SIGNIFICANT CHANGE UP (ref 80–100)
MCV RBC AUTO: 99.6 FL — SIGNIFICANT CHANGE UP (ref 80–100)
PLATELET # BLD AUTO: 128 K/UL — LOW (ref 150–400)
PLATELET # BLD AUTO: 132 K/UL — LOW (ref 150–400)
POTASSIUM SERPL-MCNC: 4.4 MMOL/L — SIGNIFICANT CHANGE UP (ref 3.5–5.3)
POTASSIUM SERPL-SCNC: 4.4 MMOL/L — SIGNIFICANT CHANGE UP (ref 3.5–5.3)
RBC # BLD: 2.52 M/UL — LOW (ref 4.2–5.8)
RBC # BLD: 2.6 M/UL — LOW (ref 4.2–5.8)
RBC # FLD: 21.6 % — HIGH (ref 10.3–14.5)
RBC # FLD: 21.7 % — HIGH (ref 10.3–14.5)
SODIUM SERPL-SCNC: 138 MMOL/L — SIGNIFICANT CHANGE UP (ref 135–145)
WBC # BLD: 4.24 K/UL — SIGNIFICANT CHANGE UP (ref 3.8–10.5)
WBC # BLD: 4.34 K/UL — SIGNIFICANT CHANGE UP (ref 3.8–10.5)
WBC # FLD AUTO: 4.24 K/UL — SIGNIFICANT CHANGE UP (ref 3.8–10.5)
WBC # FLD AUTO: 4.34 K/UL — SIGNIFICANT CHANGE UP (ref 3.8–10.5)

## 2024-09-07 PROCEDURE — 99233 SBSQ HOSP IP/OBS HIGH 50: CPT

## 2024-09-07 PROCEDURE — 93010 ELECTROCARDIOGRAM REPORT: CPT

## 2024-09-07 RX ORDER — PANTOPRAZOLE SODIUM 40 MG
40 TABLET, DELAYED RELEASE (ENTERIC COATED) ORAL
Refills: 0 | Status: DISCONTINUED | OUTPATIENT
Start: 2024-09-07 | End: 2024-09-09

## 2024-09-07 RX ORDER — ASCORBIC ACID/ASCORBATE SODIUM 500 MG
500 TABLET,CHEWABLE ORAL DAILY
Refills: 0 | Status: DISCONTINUED | OUTPATIENT
Start: 2024-09-07 | End: 2024-09-09

## 2024-09-07 RX ORDER — DEXTROSE 15 G/33 G
25 GEL IN PACKET (GRAM) ORAL ONCE
Refills: 0 | Status: DISCONTINUED | OUTPATIENT
Start: 2024-09-07 | End: 2024-09-09

## 2024-09-07 RX ORDER — ACETAMINOPHEN 325 MG/1
650 TABLET ORAL EVERY 6 HOURS
Refills: 0 | Status: DISCONTINUED | OUTPATIENT
Start: 2024-09-07 | End: 2024-09-09

## 2024-09-07 RX ORDER — SPIRONOLACTONE 25 MG/1
25 TABLET, FILM COATED ORAL DAILY
Refills: 0 | Status: DISCONTINUED | OUTPATIENT
Start: 2024-09-07 | End: 2024-09-09

## 2024-09-07 RX ORDER — MONTELUKAST SODIUM 5 MG/1
10 TABLET, CHEWABLE ORAL DAILY
Refills: 0 | Status: DISCONTINUED | OUTPATIENT
Start: 2024-09-07 | End: 2024-09-09

## 2024-09-07 RX ORDER — GLUCAGON INJECTION, SOLUTION 1 MG/.2ML
1 INJECTION, SOLUTION SUBCUTANEOUS ONCE
Refills: 0 | Status: DISCONTINUED | OUTPATIENT
Start: 2024-09-07 | End: 2024-09-09

## 2024-09-07 RX ORDER — METOPROLOL TARTRATE 100 MG/1
25 TABLET ORAL DAILY
Refills: 0 | Status: DISCONTINUED | OUTPATIENT
Start: 2024-09-07 | End: 2024-09-09

## 2024-09-07 RX ORDER — INSULIN GLARGINE 100 [IU]/ML
5 INJECTION, SOLUTION SUBCUTANEOUS EVERY MORNING
Refills: 0 | Status: DISCONTINUED | OUTPATIENT
Start: 2024-09-07 | End: 2024-09-09

## 2024-09-07 RX ORDER — LACTULOSE 10 G
10 PACKET (EA) ORAL AT BEDTIME
Refills: 0 | Status: DISCONTINUED | OUTPATIENT
Start: 2024-09-07 | End: 2024-09-09

## 2024-09-07 RX ORDER — DEXTROSE 15 G/33 G
15 GEL IN PACKET (GRAM) ORAL ONCE
Refills: 0 | Status: DISCONTINUED | OUTPATIENT
Start: 2024-09-07 | End: 2024-09-09

## 2024-09-07 RX ORDER — MAGNESIUM, ALUMINUM HYDROXIDE 200-225/5
30 SUSPENSION, ORAL (FINAL DOSE FORM) ORAL EVERY 4 HOURS
Refills: 0 | Status: DISCONTINUED | OUTPATIENT
Start: 2024-09-07 | End: 2024-09-09

## 2024-09-07 RX ORDER — FOLIC ACID 1 MG
1 TABLET ORAL DAILY
Refills: 0 | Status: DISCONTINUED | OUTPATIENT
Start: 2024-09-07 | End: 2024-09-09

## 2024-09-07 RX ORDER — ENOXAPARIN SODIUM 100 MG/ML
40 INJECTION SUBCUTANEOUS EVERY 12 HOURS
Refills: 0 | Status: DISCONTINUED | OUTPATIENT
Start: 2024-09-07 | End: 2024-09-07

## 2024-09-07 RX ORDER — ENOXAPARIN SODIUM 100 MG/ML
40 INJECTION SUBCUTANEOUS EVERY 24 HOURS
Refills: 0 | Status: DISCONTINUED | OUTPATIENT
Start: 2024-09-07 | End: 2024-09-09

## 2024-09-07 RX ORDER — DEXTROSE 15 G/33 G
12.5 GEL IN PACKET (GRAM) ORAL ONCE
Refills: 0 | Status: DISCONTINUED | OUTPATIENT
Start: 2024-09-07 | End: 2024-09-09

## 2024-09-07 RX ORDER — FUROSEMIDE 40 MG
40 TABLET ORAL DAILY
Refills: 0 | Status: DISCONTINUED | OUTPATIENT
Start: 2024-09-07 | End: 2024-09-09

## 2024-09-07 RX ADMIN — Medication 1 TABLET(S): at 10:37

## 2024-09-07 RX ADMIN — METOPROLOL TARTRATE 25 MILLIGRAM(S): 100 TABLET ORAL at 10:41

## 2024-09-07 RX ADMIN — Medication 500 MILLIGRAM(S): at 10:40

## 2024-09-07 RX ADMIN — INSULIN GLARGINE 5 UNIT(S): 100 INJECTION, SOLUTION SUBCUTANEOUS at 08:26

## 2024-09-07 RX ADMIN — MONTELUKAST SODIUM 10 MILLIGRAM(S): 5 TABLET, CHEWABLE ORAL at 10:41

## 2024-09-07 RX ADMIN — Medication 1 MILLIGRAM(S): at 10:37

## 2024-09-07 RX ADMIN — ENOXAPARIN SODIUM 40 MILLIGRAM(S): 100 INJECTION SUBCUTANEOUS at 10:36

## 2024-09-07 RX ADMIN — Medication 3 MILLIGRAM(S): at 22:31

## 2024-09-07 RX ADMIN — Medication 40 MILLIGRAM(S): at 22:31

## 2024-09-07 RX ADMIN — Medication 10 GRAM(S): at 02:11

## 2024-09-07 RX ADMIN — Medication 40 MILLIGRAM(S): at 10:41

## 2024-09-07 RX ADMIN — Medication 10 GRAM(S): at 22:31

## 2024-09-07 RX ADMIN — Medication 40 MILLIGRAM(S): at 05:05

## 2024-09-07 NOTE — PROGRESS NOTE ADULT - SUBJECTIVE AND OBJECTIVE BOX
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 Patient reports he was walking at a store with his daughter, felt dizzy/lightheaded and collapsed without LOC.  Pt's  daughter who witnessed the incident,  reports he did not strike his head, as daughter caught his head before the fall.   Pt has had dizziness with bending over during the last 3-4 days.   No rectal bleeding or dark stools. No AC or AP use.   Pt denies CP, SOB, ab pain, no n/v/d, no urinary sx's, URI sx's, no fevers, chills, no edema.    In the ED, patient was found to have Hgb 6.2 and was transfused with 2 units pRBCs.       Upon admission, LENNOX performed showing brown stool only. Orthostatics performed, patient not orthostatic. EKG showing atrial fibrillation without RVR, QTc 492 ms.      Chart and meds reviewed.  Patient seen and examined.    CC:    Subjective:    All other systems reviewed and found to be negative with the exception of what has been described above.    MEDICATIONS  (STANDING):  ascorbic acid 500 milliGRAM(s) Oral daily  dextrose 5%. 1000 milliLiter(s) (100 mL/Hr) IV Continuous <Continuous>  dextrose 5%. 1000 milliLiter(s) (50 mL/Hr) IV Continuous <Continuous>  dextrose 50% Injectable 25 Gram(s) IV Push once  dextrose 50% Injectable 25 Gram(s) IV Push once  dextrose 50% Injectable 12.5 Gram(s) IV Push once  enoxaparin Injectable 40 milliGRAM(s) SubCutaneous every 24 hours  folic acid 1 milliGRAM(s) Oral daily  furosemide    Tablet 40 milliGRAM(s) Oral daily  glucagon  Injectable 1 milliGRAM(s) IntraMuscular once  insulin glargine Injectable (LANTUS) 5 Unit(s) SubCutaneous every morning  insulin lispro (ADMELOG) corrective regimen sliding scale   SubCutaneous three times a day before meals  insulin lispro (ADMELOG) corrective regimen sliding scale   SubCutaneous at bedtime  lactulose Syrup 10 Gram(s) Oral at bedtime  metoprolol tartrate 25 milliGRAM(s) Oral daily  montelukast 10 milliGRAM(s) Oral daily  multivitamin 1 Tablet(s) Oral daily  pantoprazole    Tablet 40 milliGRAM(s) Oral two times a day  rifAXIMin 550 milliGRAM(s) Oral two times a day  spironolactone 25 milliGRAM(s) Oral daily    MEDICATIONS  (PRN):  acetaminophen     Tablet .. 650 milliGRAM(s) Oral every 6 hours PRN Temp greater or equal to 38C (100.4F), Mild Pain (1 - 3)  aluminum hydroxide/magnesium hydroxide/simethicone Suspension 30 milliLiter(s) Oral every 4 hours PRN Dyspepsia  dextrose Oral Gel 15 Gram(s) Oral once PRN Blood Glucose LESS THAN 70 milliGRAM(s)/deciliter  melatonin 3 milliGRAM(s) Oral at bedtime PRN Insomnia      VITALS:  T(F): 98.2 (09-07-24 @ 15:13), Max: 98.2 (09-07-24 @ 15:13)  HR: 61 (09-07-24 @ 15:13) (54 - 70)  BP: 119/52 (09-07-24 @ 15:13) (109/52 - 128/70)  RR: 18 (09-07-24 @ 15:13) (17 - 18)  SpO2: 98% (09-07-24 @ 15:13) (97% - 100%)  Wt(kg): --    I&O's Summary    06 Sep 2024 07:01  -  07 Sep 2024 07:00  --------------------------------------------------------  IN: 325 mL / OUT: 0 mL / NET: 325 mL        CAPILLARY BLOOD GLUCOSE      POCT Blood Glucose.: 124 mg/dL (07 Sep 2024 16:43)  POCT Blood Glucose.: 102 mg/dL (07 Sep 2024 12:35)  POCT Blood Glucose.: 113 mg/dL (07 Sep 2024 07:55)      PHYSICAL EXAM:  Gen: NAD  HEENT:  pupils equal and reactive, EOMI, no oropharyngeal lesions, erythema, exudates, oral thrush  NECK:   supple, no carotid bruits, no palpable lymph nodes, no thyromegaly  CV:  +S1, +S2, regular, no murmurs or rubs  RESP:   lungs clear to auscultation bilaterally, no wheezing, rales, rhonchi, good air entry bilaterally  BREAST:  not examined  GI:  abdomen soft, non-tender, non-distended, normal BS, no bruits, no abdominal masses, no palpable masses  RECTAL:  not examined  :  not examined  MSK:   normal muscle tone, no atrophy, no rigidity, no contractions  EXT:  no clubbing, no cyanosis, no edema, no calf pain, swelling or erythema  VASCULAR:  pulses equal and symmetric in the upper and lower extremities  NEURO:  AAOX3, no focal neurological deficits, follows all commands, able to move extremities spontaneously  SKIN:  no ulcers, lesions or rashes    LABS:                            8.3    4.24  )-----------( 128      ( 07 Sep 2024 07:52 )             25.9     09-07    138  |  108  |  46<H>  ----------------------------<  104<H>  4.4   |  22  |  1.40<H>    Ca    9.4      07 Sep 2024 07:52    TPro  6.9  /  Alb  2.9<L>  /  TBili  1.0  /  DBili  x   /  AST  51<H>  /  ALT  23  /  AlkPhos  105  09-06        LIVER FUNCTIONS - ( 06 Sep 2024 15:25 )  Alb: 2.9 g/dL / Pro: 6.9 gm/dL / ALK PHOS: 105 U/L / ALT: 23 U/L / AST: 51 U/L / GGT: x           PT/INR - ( 06 Sep 2024 15:26 )   PT: 12.6 sec;   INR: 1.12 ratio         PTT - ( 06 Sep 2024 15:26 )  PTT:29.0 sec  Urinalysis Basic - ( 07 Sep 2024 07:52 )    Color: x / Appearance: x / SG: x / pH: x  Gluc: 104 mg/dL / Ketone: x  / Bili: x / Urobili: x   Blood: x / Protein: x / Nitrite: x   Leuk Esterase: x / RBC: x / WBC x   Sq Epi: x / Non Sq Epi: x / Bacteria: x              CULTURES:      Additional results/Imaging, I have personally reviewed:    Telemetry, personally reviewed: The necessity of the time spent during the encounter on this date of service was due to:   - Ordering, reviewing, and interpreting labs, testing, and imaging.  - Independently obtaining a review of systems and performing a physical exam  - Reviewing prior hospitalization and where necessary, outpatient records.  - Counselling and educating patient and family regarding interpretation of aforementioned items and plan of care.    Time-based billing (NON-critical care). 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 Patient reports he was walking at a store with his daughter, felt dizzy/lightheaded and collapsed without LOC.  Pt's  daughter who witnessed the incident, reports he did not strike his head, as daughter caught his head before the fall.     Pt has had dizziness with bending over during the last 3-4 days.   No rectal bleeding or dark stools. No AC or AP use.   Pt denies CP, SOB, ab pain, no n/v/d, no urinary sx's, URI sx's, no fevers, chills, no edema.    In the ED, patient was found to have Hgb 6.2 and was transfused with 2 units pRBCs. EKG showing atrial fibrillation without RVR, QTc 492 ms. Upon admission, LENNOX performed by self showing brown stool only. Orthostatics performed, patient not orthostatic. Patient responded appropriately to pRBC with Hgb 8.3.       Chart and meds reviewed.  Patient seen and examined.    CC: syncopal episode     Subjective:    All other systems reviewed and found to be negative with the exception of what has been described above.    MEDICATIONS  (STANDING):  ascorbic acid 500 milliGRAM(s) Oral daily  dextrose 5%. 1000 milliLiter(s) (100 mL/Hr) IV Continuous <Continuous>  dextrose 5%. 1000 milliLiter(s) (50 mL/Hr) IV Continuous <Continuous>  dextrose 50% Injectable 25 Gram(s) IV Push once  dextrose 50% Injectable 25 Gram(s) IV Push once  dextrose 50% Injectable 12.5 Gram(s) IV Push once  enoxaparin Injectable 40 milliGRAM(s) SubCutaneous every 24 hours  folic acid 1 milliGRAM(s) Oral daily  furosemide    Tablet 40 milliGRAM(s) Oral daily  glucagon  Injectable 1 milliGRAM(s) IntraMuscular once  insulin glargine Injectable (LANTUS) 5 Unit(s) SubCutaneous every morning  insulin lispro (ADMELOG) corrective regimen sliding scale   SubCutaneous three times a day before meals  insulin lispro (ADMELOG) corrective regimen sliding scale   SubCutaneous at bedtime  lactulose Syrup 10 Gram(s) Oral at bedtime  metoprolol tartrate 25 milliGRAM(s) Oral daily  montelukast 10 milliGRAM(s) Oral daily  multivitamin 1 Tablet(s) Oral daily  pantoprazole    Tablet 40 milliGRAM(s) Oral two times a day  rifAXIMin 550 milliGRAM(s) Oral two times a day  spironolactone 25 milliGRAM(s) Oral daily    MEDICATIONS  (PRN):  acetaminophen     Tablet .. 650 milliGRAM(s) Oral every 6 hours PRN Temp greater or equal to 38C (100.4F), Mild Pain (1 - 3)  aluminum hydroxide/magnesium hydroxide/simethicone Suspension 30 milliLiter(s) Oral every 4 hours PRN Dyspepsia  dextrose Oral Gel 15 Gram(s) Oral once PRN Blood Glucose LESS THAN 70 milliGRAM(s)/deciliter  melatonin 3 milliGRAM(s) Oral at bedtime PRN Insomnia      VITALS:  T(F): 98.2 (09-07-24 @ 15:13), Max: 98.2 (09-07-24 @ 15:13)  HR: 61 (09-07-24 @ 15:13) (54 - 70)  BP: 119/52 (09-07-24 @ 15:13) (109/52 - 128/70)  RR: 18 (09-07-24 @ 15:13) (17 - 18)  SpO2: 98% (09-07-24 @ 15:13) (97% - 100%)  Wt(kg): --    I&O's Summary    06 Sep 2024 07:01  -  07 Sep 2024 07:00  --------------------------------------------------------  IN: 325 mL / OUT: 0 mL / NET: 325 mL        CAPILLARY BLOOD GLUCOSE      POCT Blood Glucose.: 124 mg/dL (07 Sep 2024 16:43)  POCT Blood Glucose.: 102 mg/dL (07 Sep 2024 12:35)  POCT Blood Glucose.: 113 mg/dL (07 Sep 2024 07:55)      PHYSICAL EXAM:  Gen: NAD  HEENT:  pupils equal and reactive, EOMI, no oropharyngeal lesions, erythema, exudates, oral thrush  NECK:   supple, no carotid bruits, no palpable lymph nodes, no thyromegaly  CV:  +S1, +S2, irreg irreg, no murmurs or rubs  RESP:   lungs clear to auscultation bilaterally, no wheezing, rales, rhonchi, good air entry bilaterally  BREAST:  not examined  GI:  abdomen soft, non-tender, non-distended, normal BS, no bruits, no abdominal masses, no palpable masses  RECTAL: LENNOX with brown stool only   MSK:   normal muscle tone, no atrophy, no rigidity, no contractions  EXT:  no clubbing, no cyanosis, no edema, no calf pain, swelling or erythema  VASCULAR:  pulses equal and symmetric in the upper and lower extremities  NEURO:  AAOX3, no focal neurological deficits, follows all commands, able to move extremities spontaneously  SKIN:  no ulcers, lesions or rashes    LABS:                            8.3    4.24  )-----------( 128      ( 07 Sep 2024 07:52 )             25.9     09-07    138  |  108  |  46<H>  ----------------------------<  104<H>  4.4   |  22  |  1.40<H>    Ca    9.4      07 Sep 2024 07:52    TPro  6.9  /  Alb  2.9<L>  /  TBili  1.0  /  DBili  x   /  AST  51<H>  /  ALT  23  /  AlkPhos  105  09-06        LIVER FUNCTIONS - ( 06 Sep 2024 15:25 )  Alb: 2.9 g/dL / Pro: 6.9 gm/dL / ALK PHOS: 105 U/L / ALT: 23 U/L / AST: 51 U/L / GGT: x           PT/INR - ( 06 Sep 2024 15:26 )   PT: 12.6 sec;   INR: 1.12 ratio         PTT - ( 06 Sep 2024 15:26 )  PTT:29.0 sec  Urinalysis Basic - ( 07 Sep 2024 07:52 )    Color: x / Appearance: x / SG: x / pH: x  Gluc: 104 mg/dL / Ketone: x  / Bili: x / Urobili: x   Blood: x / Protein: x / Nitrite: x   Leuk Esterase: x / RBC: x / WBC x   Sq Epi: x / Non Sq Epi: x / Bacteria: x

## 2024-09-08 LAB
ANION GAP SERPL CALC-SCNC: 8 MMOL/L — SIGNIFICANT CHANGE UP (ref 5–17)
BASOPHILS # BLD AUTO: 0.02 K/UL — SIGNIFICANT CHANGE UP (ref 0–0.2)
BASOPHILS NFR BLD AUTO: 0.4 % — SIGNIFICANT CHANGE UP (ref 0–2)
BUN SERPL-MCNC: 43 MG/DL — HIGH (ref 7–23)
CALCIUM SERPL-MCNC: 9.4 MG/DL — SIGNIFICANT CHANGE UP (ref 8.5–10.1)
CHLORIDE SERPL-SCNC: 110 MMOL/L — HIGH (ref 96–108)
CO2 SERPL-SCNC: 19 MMOL/L — LOW (ref 22–31)
CREAT SERPL-MCNC: 1.35 MG/DL — HIGH (ref 0.5–1.3)
EGFR: 53 ML/MIN/1.73M2 — LOW
EOSINOPHIL # BLD AUTO: 0.34 K/UL — SIGNIFICANT CHANGE UP (ref 0–0.5)
EOSINOPHIL NFR BLD AUTO: 7.2 % — HIGH (ref 0–6)
FERRITIN SERPL-MCNC: 68 NG/ML — SIGNIFICANT CHANGE UP (ref 30–400)
FOLATE SERPL-MCNC: >20 NG/ML — SIGNIFICANT CHANGE UP
GLUCOSE BLDC GLUCOMTR-MCNC: 108 MG/DL — HIGH (ref 70–99)
GLUCOSE BLDC GLUCOMTR-MCNC: 117 MG/DL — HIGH (ref 70–99)
GLUCOSE BLDC GLUCOMTR-MCNC: 147 MG/DL — HIGH (ref 70–99)
GLUCOSE BLDC GLUCOMTR-MCNC: 161 MG/DL — HIGH (ref 70–99)
GLUCOSE SERPL-MCNC: 163 MG/DL — HIGH (ref 70–99)
HCT VFR BLD CALC: 27.4 % — LOW (ref 39–50)
HGB BLD-MCNC: 8.6 G/DL — LOW (ref 13–17)
IMM GRANULOCYTES NFR BLD AUTO: 0.6 % — SIGNIFICANT CHANGE UP (ref 0–0.9)
IRON SATN MFR SERPL: 17 % — SIGNIFICANT CHANGE UP (ref 16–55)
IRON SATN MFR SERPL: 56 UG/DL — SIGNIFICANT CHANGE UP (ref 45–165)
LDH SERPL L TO P-CCNC: 311 U/L — HIGH (ref 84–241)
LYMPHOCYTES # BLD AUTO: 0.85 K/UL — LOW (ref 1–3.3)
LYMPHOCYTES # BLD AUTO: 18 % — SIGNIFICANT CHANGE UP (ref 13–44)
MCHC RBC-ENTMCNC: 31.4 GM/DL — LOW (ref 32–36)
MCHC RBC-ENTMCNC: 32 PG — SIGNIFICANT CHANGE UP (ref 27–34)
MCV RBC AUTO: 101.9 FL — HIGH (ref 80–100)
MONOCYTES # BLD AUTO: 0.52 K/UL — SIGNIFICANT CHANGE UP (ref 0–0.9)
MONOCYTES NFR BLD AUTO: 11 % — SIGNIFICANT CHANGE UP (ref 2–14)
NEUTROPHILS # BLD AUTO: 2.97 K/UL — SIGNIFICANT CHANGE UP (ref 1.8–7.4)
NEUTROPHILS NFR BLD AUTO: 62.8 % — SIGNIFICANT CHANGE UP (ref 43–77)
PLATELET # BLD AUTO: 120 K/UL — LOW (ref 150–400)
POTASSIUM SERPL-MCNC: 4.5 MMOL/L — SIGNIFICANT CHANGE UP (ref 3.5–5.3)
POTASSIUM SERPL-SCNC: 4.5 MMOL/L — SIGNIFICANT CHANGE UP (ref 3.5–5.3)
RBC # BLD: 2.69 M/UL — LOW (ref 4.2–5.8)
RBC # BLD: 2.69 M/UL — LOW (ref 4.2–5.8)
RBC # FLD: 20.5 % — HIGH (ref 10.3–14.5)
RETICS #: 151 K/UL — HIGH (ref 25–125)
RETICS/RBC NFR: 5.7 % — HIGH (ref 0.5–2.5)
SODIUM SERPL-SCNC: 137 MMOL/L — SIGNIFICANT CHANGE UP (ref 135–145)
TIBC SERPL-MCNC: 321 UG/DL — SIGNIFICANT CHANGE UP (ref 220–430)
UIBC SERPL-MCNC: 266 UG/DL — SIGNIFICANT CHANGE UP (ref 110–370)
VIT B12 SERPL-MCNC: >2000 PG/ML — HIGH (ref 232–1245)
WBC # BLD: 4.73 K/UL — SIGNIFICANT CHANGE UP (ref 3.8–10.5)
WBC # FLD AUTO: 4.73 K/UL — SIGNIFICANT CHANGE UP (ref 3.8–10.5)

## 2024-09-08 PROCEDURE — 93010 ELECTROCARDIOGRAM REPORT: CPT

## 2024-09-08 PROCEDURE — 99232 SBSQ HOSP IP/OBS MODERATE 35: CPT

## 2024-09-08 RX ADMIN — Medication 3 MILLIGRAM(S): at 22:25

## 2024-09-08 RX ADMIN — MONTELUKAST SODIUM 10 MILLIGRAM(S): 5 TABLET, CHEWABLE ORAL at 10:49

## 2024-09-08 RX ADMIN — Medication 500 MILLIGRAM(S): at 10:49

## 2024-09-08 RX ADMIN — ENOXAPARIN SODIUM 40 MILLIGRAM(S): 100 INJECTION SUBCUTANEOUS at 10:48

## 2024-09-08 RX ADMIN — Medication 1: at 17:24

## 2024-09-08 RX ADMIN — Medication 40 MILLIGRAM(S): at 10:49

## 2024-09-08 RX ADMIN — INSULIN GLARGINE 5 UNIT(S): 100 INJECTION, SOLUTION SUBCUTANEOUS at 08:31

## 2024-09-08 RX ADMIN — Medication 1 TABLET(S): at 10:48

## 2024-09-08 RX ADMIN — Medication 40 MILLIGRAM(S): at 22:24

## 2024-09-08 RX ADMIN — Medication 10 GRAM(S): at 22:23

## 2024-09-08 RX ADMIN — Medication 1 MILLIGRAM(S): at 10:48

## 2024-09-08 NOTE — PROGRESS NOTE ADULT - SUBJECTIVE AND OBJECTIVE BOX
HOSPITALIST ATTENDING PROGRESS NOTE    Chart and meds reviewed.  Patient seen and examined.    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 Patient reports he was walking at a store with his daughter, felt dizzy/lightheaded and collapsed without LOC.  Pt's  daughter who witnessed the incident, reports he did not strike his head, as daughter caught his head before the fall.     Pt has had dizziness with bending over during the last 3-4 days.   No rectal bleeding or dark stools. No AC or AP use.   Pt denies CP, SOB, ab pain, no n/v/d, no urinary sx's, URI sx's, no fevers, chills, no edema.    In the ED, patient was found to have Hgb 6.2 and was transfused with 2 units pRBCs. EKG showing atrial fibrillation without RVR, QTc 492 ms. Upon admission, LENNOX performed by self showing brown stool only. Orthostatics performed, patient not orthostatic. Patient responded appropriately to pRBC with Hgb 8.3.     Subjective:09/08: Patient feels well.  Denies any lightheadedness dizziness headaches    All other systems reviewed and found to be negative with the exception of what has been described above.    MEDICATIONS  (STANDING):  ascorbic acid 500 milliGRAM(s) Oral daily  dextrose 5%. 1000 milliLiter(s) (50 mL/Hr) IV Continuous <Continuous>  dextrose 5%. 1000 milliLiter(s) (100 mL/Hr) IV Continuous <Continuous>  dextrose 50% Injectable 12.5 Gram(s) IV Push once  dextrose 50% Injectable 25 Gram(s) IV Push once  dextrose 50% Injectable 25 Gram(s) IV Push once  enoxaparin Injectable 40 milliGRAM(s) SubCutaneous every 24 hours  folic acid 1 milliGRAM(s) Oral daily  furosemide    Tablet 40 milliGRAM(s) Oral daily  glucagon  Injectable 1 milliGRAM(s) IntraMuscular once  insulin glargine Injectable (LANTUS) 5 Unit(s) SubCutaneous every morning  insulin lispro (ADMELOG) corrective regimen sliding scale   SubCutaneous three times a day before meals  insulin lispro (ADMELOG) corrective regimen sliding scale   SubCutaneous at bedtime  lactulose Syrup 10 Gram(s) Oral at bedtime  metoprolol tartrate 25 milliGRAM(s) Oral daily  montelukast 10 milliGRAM(s) Oral daily  multivitamin 1 Tablet(s) Oral daily  pantoprazole    Tablet 40 milliGRAM(s) Oral two times a day  rifAXIMin 550 milliGRAM(s) Oral two times a day  spironolactone 25 milliGRAM(s) Oral daily    MEDICATIONS  (PRN):  acetaminophen     Tablet .. 650 milliGRAM(s) Oral every 6 hours PRN Temp greater or equal to 38C (100.4F), Mild Pain (1 - 3)  aluminum hydroxide/magnesium hydroxide/simethicone Suspension 30 milliLiter(s) Oral every 4 hours PRN Dyspepsia  dextrose Oral Gel 15 Gram(s) Oral once PRN Blood Glucose LESS THAN 70 milliGRAM(s)/deciliter  melatonin 3 milliGRAM(s) Oral at bedtime PRN Insomnia      VITALS:  T(F): 97.9 (09-08-24 @ 07:49), Max: 98.2 (09-07-24 @ 15:13)  HR: 56 (09-08-24 @ 10:37) (56 - 66)  BP: 101/41 (09-08-24 @ 10:37) (100/51 - 119/52)  RR: 17 (09-08-24 @ 10:37) (17 - 18)  SpO2: 96% (09-08-24 @ 10:37) (96% - 98%)  Wt(kg): --    I&O's Summary      CAPILLARY BLOOD GLUCOSE      POCT Blood Glucose.: 108 mg/dL (08 Sep 2024 11:47)  POCT Blood Glucose.: 117 mg/dL (08 Sep 2024 08:13)  POCT Blood Glucose.: 137 mg/dL (07 Sep 2024 22:35)  POCT Blood Glucose.: 124 mg/dL (07 Sep 2024 16:43)      PHYSICAL EXAM:  Gen: NAD  HEENT:  pupils equal and reactive, EOMI, no oropharyngeal lesions, erythema, exudates, oral thrush  NECK:   supple, no carotid bruits, no palpable lymph nodes, no thyromegaly  CV:  +S1, +S2, regular, no murmurs or rubs  RESP:   lungs clear to auscultation bilaterally, no wheezing, rales, rhonchi, good air entry bilaterally  GI:  abdomen soft, non-tender, non-distended, normal BS, no bruits, no abdominal masses, no palpable masses  RECTAL:  not examined  :  not examined  MSK:   normal muscle tone, no atrophy, no rigidity, no contractions  EXT:  no clubbing, no cyanosis, no edema, no calf pain, swelling or erythema  VASCULAR:  pulses equal and symmetric in the upper and lower extremities  NEURO:  AAOX3, no focal neurological deficits, follows all commands, able to move extremities spontaneously  SKIN:  no ulcers, lesions or rashes    LABS:                            8.6    4.73  )-----------( 120      ( 08 Sep 2024 08:57 )             27.4     09-08    137  |  110<H>  |  43<H>  ----------------------------<  163<H>  4.5   |  19<L>  |  1.35<H>    Ca    9.4      08 Sep 2024 08:57    TPro  6.9  /  Alb  2.9<L>  /  TBili  1.0  /  DBili  x   /  AST  51<H>  /  ALT  23  /  AlkPhos  105  09-06        LIVER FUNCTIONS - ( 06 Sep 2024 15:25 )  Alb: 2.9 g/dL / Pro: 6.9 gm/dL / ALK PHOS: 105 U/L / ALT: 23 U/L / AST: 51 U/L / GGT: x           PT/INR - ( 06 Sep 2024 15:26 )   PT: 12.6 sec;   INR: 1.12 ratio         PTT - ( 06 Sep 2024 15:26 )  PTT:29.0 sec  Urinalysis Basic - ( 08 Sep 2024 08:57 )    Color: x / Appearance: x / SG: x / pH: x  Gluc: 163 mg/dL / Ketone: x  / Bili: x / Urobili: x   Blood: x / Protein: x / Nitrite: x   Leuk Esterase: x / RBC: x / WBC x   Sq Epi: x / Non Sq Epi: x / Bacteria: x

## 2024-09-08 NOTE — PROGRESS NOTE ADULT - ASSESSMENT
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 Multifactorial anemia, less likely acute GIB.     #Symptomatic Anemia  #Presyncope  #Dizziness and collapse  #sinus bradycardia  - Hgb on admission 6.2, s/p 2pRBC in ED   Hemoglobin this morning 8.6  Hemolytic labs were drawn this morning however unreliable given drawn after transfusion  Iron studies drawn this morning and also after transfusion  Spoke to patient's daughter who is his primary caretaker–states that [facility] and will get transfusions as needed so far he is required to transfusions this year in addition to the ones received during this admission  patient feels well this morning and hemoglobin appears to have appropriately risen after 2 units  Repeat orthostats were negative  However during orthostats nurse noted that patient's heart rate with intermittently go down to 28  Stat EKG  Remote telemetry  Patient will require monitoring on telemetry overnight for further episodes of bradycardia especially given recently diagnosed A-fib    #hx UGIB iso AVMs s/p cautery   - [facility] Dr. Lee Posada [phone]  - LENNOX negative as above, do not suspect GIB   - Per discussion with outside GI, patient is presently on octreotide every month as this may help with AVM's gastropathy   - also being worked up by [facility] heme/onc for possible MDS  [facility] with the [facility] on Wednesday of this week    #compensated cirrhosis   #hx hepatic encephalopathy   - c/w home lactulose and rifaximin   - no evidence of asterixis or abdominal distension, patient AOx3    #HTN  #atrial fibrillation, not on AC   - CHADSVASC > 3 but AC has been deferred in the past given patient's longstanding hx of anemia   - c/w home Metoprolol, home lasix     Dispo  - Patient has [facility] aides ([facility] are [facility]), already active.  - Lives at home with daughter    
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 Multifactorial anemia, less likely acute GIB.     #Symptomatic Anemia  #Presyncope  #Dizziness and collapse  - Hgb on admission 6.2, s/p 2pRBC in ED   - Patient with appropriate response, later 8.1 then 8.3    - Do not suspect GIB given negative LENNOX  - Per daughter, patient receives ESAs and has required blood transfusions in the past   - He has not needed to be transfused since 6/2024 during a recent admission to [place]  - Baseline Hgb approximately 7-8   - f/u hemolytic labs and Fe labs in AM     #hx UGIB iso AVMs s/p cautery   - [facility], Dr. Lee Posada [phone]  - LENNOX negative as above, do not suspect GIB   - Per discussion with outside GI, patient is presently on octreotide every month as this may help with AVM's gastropathy   - also being worked up by [facility] heme/onc for possible MDS  - Ensure patient with good outpatient follow up on d/c     #compensated cirrhosis   #hx hepatic encephalopathy   - c/w home lactulose and rifaximin   - no evidence of asterixis or abdominal distension, patient AOx3    #HTN  #atrial fibrillation, not on AC   - CHADSVASC > 3 but AC has been deferred in the past given patient's longstanding hx of anemia   - c/w home Metoprolol, home lasix   - Patient declined in house cardiology evaluation as he already has care through [facility]         TRACIE garciax  Full Code        HCP: Daughter [phone]  Disposition: home pending stable Hgb     - Patient has [facility] aides (they are set up [facility]), already active. If patient can be discharged by Monday, nothing further to do   - Lives at home with daughter

## 2024-09-09 VITALS
HEART RATE: 62 BPM | DIASTOLIC BLOOD PRESSURE: 54 MMHG | TEMPERATURE: 98 F | RESPIRATION RATE: 19 BRPM | OXYGEN SATURATION: 100 % | SYSTOLIC BLOOD PRESSURE: 112 MMHG

## 2024-09-09 DIAGNOSIS — R55 SYNCOPE AND COLLAPSE: ICD-10-CM

## 2024-09-09 DIAGNOSIS — R00.1 BRADYCARDIA, UNSPECIFIED: ICD-10-CM

## 2024-09-09 DIAGNOSIS — I48.91 UNSPECIFIED ATRIAL FIBRILLATION: ICD-10-CM

## 2024-09-09 LAB
A1C WITH ESTIMATED AVERAGE GLUCOSE RESULT: 4.8 % — SIGNIFICANT CHANGE UP (ref 4–5.6)
ALBUMIN SERPL ELPH-MCNC: 2.7 G/DL — LOW (ref 3.3–5)
ALP SERPL-CCNC: 110 U/L — SIGNIFICANT CHANGE UP (ref 40–120)
ALT FLD-CCNC: 22 U/L — SIGNIFICANT CHANGE UP (ref 12–78)
ANION GAP SERPL CALC-SCNC: 8 MMOL/L — SIGNIFICANT CHANGE UP (ref 5–17)
AST SERPL-CCNC: 38 U/L — HIGH (ref 15–37)
BILIRUB SERPL-MCNC: 1.4 MG/DL — HIGH (ref 0.2–1.2)
BUN SERPL-MCNC: 38 MG/DL — HIGH (ref 7–23)
CALCIUM SERPL-MCNC: 9.3 MG/DL — SIGNIFICANT CHANGE UP (ref 8.5–10.1)
CHLORIDE SERPL-SCNC: 109 MMOL/L — HIGH (ref 96–108)
CO2 SERPL-SCNC: 22 MMOL/L — SIGNIFICANT CHANGE UP (ref 22–31)
CREAT SERPL-MCNC: 1.27 MG/DL — SIGNIFICANT CHANGE UP (ref 0.5–1.3)
EGFR: 57 ML/MIN/1.73M2 — LOW
ESTIMATED AVERAGE GLUCOSE: 91 MG/DL — SIGNIFICANT CHANGE UP (ref 68–114)
GLUCOSE BLDC GLUCOMTR-MCNC: 109 MG/DL — HIGH (ref 70–99)
GLUCOSE BLDC GLUCOMTR-MCNC: 181 MG/DL — HIGH (ref 70–99)
GLUCOSE BLDC GLUCOMTR-MCNC: 195 MG/DL — HIGH (ref 70–99)
GLUCOSE SERPL-MCNC: 106 MG/DL — HIGH (ref 70–99)
HCT VFR BLD CALC: 27 % — LOW (ref 39–50)
HGB BLD-MCNC: 8.2 G/DL — LOW (ref 13–17)
MAGNESIUM SERPL-MCNC: 1.7 MG/DL — SIGNIFICANT CHANGE UP (ref 1.6–2.6)
MCHC RBC-ENTMCNC: 30.4 GM/DL — LOW (ref 32–36)
MCHC RBC-ENTMCNC: 31.2 PG — SIGNIFICANT CHANGE UP (ref 27–34)
MCV RBC AUTO: 102.7 FL — HIGH (ref 80–100)
PLATELET # BLD AUTO: 121 K/UL — LOW (ref 150–400)
POTASSIUM SERPL-MCNC: 4.1 MMOL/L — SIGNIFICANT CHANGE UP (ref 3.5–5.3)
POTASSIUM SERPL-SCNC: 4.1 MMOL/L — SIGNIFICANT CHANGE UP (ref 3.5–5.3)
PROT SERPL-MCNC: 6.3 GM/DL — SIGNIFICANT CHANGE UP (ref 6–8.3)
RBC # BLD: 2.63 M/UL — LOW (ref 4.2–5.8)
RBC # FLD: 19.9 % — HIGH (ref 10.3–14.5)
SODIUM SERPL-SCNC: 139 MMOL/L — SIGNIFICANT CHANGE UP (ref 135–145)
WBC # BLD: 4.17 K/UL — SIGNIFICANT CHANGE UP (ref 3.8–10.5)
WBC # FLD AUTO: 4.17 K/UL — SIGNIFICANT CHANGE UP (ref 3.8–10.5)

## 2024-09-09 PROCEDURE — 99239 HOSP IP/OBS DSCHRG MGMT >30: CPT

## 2024-09-09 PROCEDURE — 99223 1ST HOSP IP/OBS HIGH 75: CPT

## 2024-09-09 RX ADMIN — Medication 40 MILLIGRAM(S): at 05:25

## 2024-09-09 RX ADMIN — Medication 1 MILLIGRAM(S): at 10:22

## 2024-09-09 RX ADMIN — INSULIN GLARGINE 5 UNIT(S): 100 INJECTION, SOLUTION SUBCUTANEOUS at 08:21

## 2024-09-09 RX ADMIN — Medication 40 MILLIGRAM(S): at 10:22

## 2024-09-09 RX ADMIN — MONTELUKAST SODIUM 10 MILLIGRAM(S): 5 TABLET, CHEWABLE ORAL at 10:22

## 2024-09-09 RX ADMIN — Medication 1 TABLET(S): at 10:22

## 2024-09-09 RX ADMIN — Medication 500 MILLIGRAM(S): at 10:23

## 2024-09-09 RX ADMIN — Medication 1: at 12:15

## 2024-09-09 RX ADMIN — Medication 1: at 16:56

## 2024-09-09 RX ADMIN — ENOXAPARIN SODIUM 40 MILLIGRAM(S): 100 INJECTION SUBCUTANEOUS at 10:23

## 2024-09-09 NOTE — DISCHARGE NOTE NURSING/CASE MANAGEMENT/SOCIAL WORK - PATIENT PORTAL LINK FT
You can access the FollowMyHealth Patient Portal offered by [facility] by registering at the following website: [email or web address]. By joining [employer]’s FollowMyHealth portal, you will also be able to view your health information using other applications (apps) compatible with our system.

## 2024-09-09 NOTE — CONSULT NOTE ADULT - PROBLEM SELECTOR RECOMMENDATION 9
-suspect 2/2 anemia Hb 6.2 on admit  -consider transfusion to Hb 8 -suspect 2/2 anemia Hb 6.2 on admit  -consider transfusion to Hb 8  -systolic murmur likely flow related recommend echo.  Could be done as OP.

## 2024-09-09 NOTE — DISCHARGE NOTE PROVIDER - NSDCPNSUBOBJ_GEN_ALL_CORE
HOSPITALIST ATTENDING PROGRESS NOTE    Chart and meds reviewed.  Patient seen and examined.  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 Patient reports he was walking at a store with his daughter, felt dizzy/lightheaded and collapsed without LOC.  Pt's  daughter who witnessed the incident, reports he did not strike his head, as daughter caught his head before the fall.     Pt has had dizziness with bending over during the last 3-4 days.   No rectal bleeding or dark stools. No AC or AP use.   Pt denies CP, SOB, ab pain, no n/v/d, no urinary sx's, URI sx's, no fevers, chills, no edema.    In the ED, patient was found to have Hgb 6.2 and was transfused with 2 units pRBCs. EKG showing atrial fibrillation without RVR, QTc 492 ms. Upon admission, LENNOX performed by self showing brown stool only. Orthostatics performed, patient not orthostatic. Patient responded appropriately to pRBC with Hgb 8.3.     Subjective:09/08: Patient feels well.  Denies any lightheadedness dizziness headaches    09/09: Patient feels well ; until tele sinus rhythm 70s; sinus bradycardia to low 56 while sleeping; runs of A-fib while going to the bathroom; highest rate 130 for 7 seconds  All other systems reviewed and found to be negative with the exception of what has been described above.    MEDICATIONS  (STANDING):  ascorbic acid 500 milliGRAM(s) Oral daily  dextrose 5%. 1000 milliLiter(s) (50 mL/Hr) IV Continuous <Continuous>  dextrose 5%. 1000 milliLiter(s) (100 mL/Hr) IV Continuous <Continuous>  dextrose 50% Injectable 12.5 Gram(s) IV Push once  dextrose 50% Injectable 25 Gram(s) IV Push once  dextrose 50% Injectable 25 Gram(s) IV Push once  enoxaparin Injectable 40 milliGRAM(s) SubCutaneous every 24 hours  folic acid 1 milliGRAM(s) Oral daily  furosemide    Tablet 40 milliGRAM(s) Oral daily  glucagon  Injectable 1 milliGRAM(s) IntraMuscular once  insulin glargine Injectable (LANTUS) 5 Unit(s) SubCutaneous every morning  insulin lispro (ADMELOG) corrective regimen sliding scale   SubCutaneous three times a day before meals  insulin lispro (ADMELOG) corrective regimen sliding scale   SubCutaneous at bedtime  lactulose Syrup 10 Gram(s) Oral at bedtime  metoprolol tartrate 25 milliGRAM(s) Oral daily  montelukast 10 milliGRAM(s) Oral daily  multivitamin 1 Tablet(s) Oral daily  pantoprazole    Tablet 40 milliGRAM(s) Oral two times a day  rifAXIMin 550 milliGRAM(s) Oral two times a day  spironolactone 25 milliGRAM(s) Oral daily    MEDICATIONS  (PRN):  acetaminophen     Tablet .. 650 milliGRAM(s) Oral every 6 hours PRN Temp greater or equal to 38C (100.4F), Mild Pain (1 - 3)  aluminum hydroxide/magnesium hydroxide/simethicone Suspension 30 milliLiter(s) Oral every 4 hours PRN Dyspepsia  dextrose Oral Gel 15 Gram(s) Oral once PRN Blood Glucose LESS THAN 70 milliGRAM(s)/deciliter  melatonin 3 milliGRAM(s) Oral at bedtime PRN Insomnia      VITALS:  T(F): 98 (09-09-24 @ 15:52), Max: 98.2 (09-09-24 @ 14:05)  HR: 62 (09-09-24 @ 15:52) (57 - 100)  BP: 112/54 (09-09-24 @ 15:52) (100/80 - 120/59)  RR: 19 (09-09-24 @ 15:52) (18 - 19)  SpO2: 100% (09-09-24 @ 15:52) (100% - 100%)  Wt(kg): --    I&O's Summary      CAPILLARY BLOOD GLUCOSE      POCT Blood Glucose.: 195 mg/dL (09 Sep 2024 16:53)  POCT Blood Glucose.: 181 mg/dL (09 Sep 2024 11:52)  POCT Blood Glucose.: 109 mg/dL (09 Sep 2024 07:49)  POCT Blood Glucose.: 147 mg/dL (08 Sep 2024 22:21)      PHYSICAL EXAM:  Gen: NAD  HEENT:  pupils equal and reactive, EOMI, no oropharyngeal lesions, erythema, exudates, oral thrush  NECK:   supple, no carotid bruits, no palpable lymph nodes, no thyromegaly  CV:  +S1, +S2, regular, feint 2/6 systolic murmurs or rubs  RESP:   lungs clear to auscultation bilaterally, no wheezing, rales, rhonchi, good air entry bilaterally  GI:  abdomen soft, non-tender, non-distended, normal BS, no bruits, no abdominal masses, no palpable masses  RECTAL:  not examined  :  not examined  MSK:   normal muscle tone, no atrophy, no rigidity, no contractions  EXT:  no clubbing, no cyanosis, no edema, no calf pain, swelling or erythema  VASCULAR:  pulses equal and symmetric in the upper and lower extremities  NEURO:  AAOX3, no focal neurological deficits, follows all commands, able to move extremities spontaneously    LABS:                            8.2    4.17  )-----------( 121      ( 09 Sep 2024 08:17 )             27.0     09-09    139  |  109<H>  |  38<H>  ----------------------------<  106<H>  4.1   |  22  |  1.27    Ca    9.3      09 Sep 2024 08:17  Mg     1.7     09-09    TPro  6.3  /  Alb  2.7<L>  /  TBili  1.4<H>  /  DBili  x   /  AST  38<H>  /  ALT  22  /  AlkPhos  110  09-09        LIVER FUNCTIONS - ( 09 Sep 2024 08:17 )  Alb: 2.7 g/dL / Pro: 6.3 gm/dL / ALK PHOS: 110 U/L / ALT: 22 U/L / AST: 38 U/L / GGT: x             Urinalysis Basic - ( 09 Sep 2024 08:17 )    Color: x / Appearance: x / SG: x / pH: x  Gluc: 106 mg/dL / Ketone: x  / Bili: x / Urobili: x   Blood: x / Protein: x / Nitrite: x   Leuk Esterase: x / RBC: x / WBC x   Sq Epi: x / Non Sq Epi: x / Bacteria: x

## 2024-09-09 NOTE — CONSULT NOTE ADULT - PROBLEM SELECTOR RECOMMENDATION 3
-h/o afib currently NSR  -not on anticoag d/t anemia.    -Cont. to hold anticoag   future anticoag to be discussed w/ OP cardiologist.

## 2024-09-09 NOTE — CONSULT NOTE ADULT - PROBLEM SELECTOR RECOMMENDATION 2
-Tele reviewed sinus jay jay lowest to 56 at 8am likely related to sleep.  -ECG sinus jay jay 57    -no significant arrhythmias unlikely cause of syncope. -Tele reviewed sinus jay jay lowest to 56 at 8am likely related to sleep.  -ECG sinus jay jay 57    -no significant arrhythmias unlikely cause of syncope.  -HR 20s during orthostasis per hospitalist likely incorrectly measured

## 2024-09-09 NOTE — DISCHARGE NOTE PROVIDER - NSDCCPCAREPLAN_GEN_ALL_CORE_FT
PRINCIPAL DISCHARGE DIAGNOSIS  Diagnosis: Symptomatic anemia  Assessment and Plan of Treatment: You are diagnosed with anemia which improved after 2 units of packed red blood cells please follow-up with your outpatient hematologist oncologist.  An outpatient bone marrow biopsy may be considered please discuss this with your hematologist      SECONDARY DISCHARGE DIAGNOSES  Diagnosis: Syncope  Assessment and Plan of Treatment: Please follow-up for an outpatient echocardiogram with your cardiologist for your murmur

## 2024-09-09 NOTE — CONSULT NOTE ADULT - SUBJECTIVE AND OBJECTIVE BOX
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   Pt reports he was walking at a store with his daughter, felt dizzy/lightheaded and collapsed without LOC.  Pt's  daughter who witnessed the incident,  reports he did not strike his head, as daughter caught his head before the fall.   Pt has had dizziness with bending over during the last 3-4 days.   No rectal bleeding or dark stools. No AC or AP use.   Pt denies CP, SOB, ab pain, no n/v/d, no urinary sx's, URI sx's, no fevers, chills, no edema.   (06 Sep 2024 23:57)    consulted for bradycardia & recent afib.  Reviewed Tele sinus afib episodes  5-6 episodes 6 sec 140s in afib  lowest 56 bpm 8am.  Reviewed symptoms. pt denies any palpitations, chest pain, dyspnea prior to LOC  LOC seconds in duration. no postepisode confusion.  No prior episodes.      PAST MEDICAL AND SURGICAL HISTORY:  PAST MEDICAL & SURGICAL HISTORY:  HTN (hypertension)      Hepatic encephalopathy          ALLERGIES:  Allergies    Mushrooms (Unknown)  penicillin (Unknown)    Intolerances        SOCIAL HISTORY:  Social History:  Pt lives with daughter.  He is a .  No tob/ETOH/drug. (06 Sep 2024 23:57)      FAMILY  HISTORY:  FAMILY HISTORY:  FH: HTN (hypertension)        MEDICATIONS:  OUTPATIENT:  Home Medications:  ascorbic acid 500 mg oral tablet: 1 tab(s) orally once a day (06 Sep 2024 18:28)  folic acid 1 mg oral tablet: 1 tab(s) orally once a day (06 Sep 2024 18:28)  furosemide 40 mg oral tablet: 1 tab(s) orally once a day (06 Sep 2024 18:27)  lactulose 10 g/15 mL oral and rectal liquid: 10 gram(s) orally once a day (06 Sep 2024 18:28)  loratadine 10 mg oral tablet: 1 tab(s) orally once a day (06 Sep 2024 18:28)  Metoprolol Tartrate 25 mg oral tablet: 1 tab(s) orally once a day (06 Sep 2024 18:28)  montelukast 10 mg oral tablet: 1 tab(s) orally once a day (06 Sep 2024 18:28)  Multiple Vitamins oral tablet: 1 tab(s) orally 2 times a day (06 Sep 2024 18:28)  pantoprazole 40 mg oral delayed release tablet: 1 tab(s) orally 2 times a day (06 Sep 2024 18:28)  rifAXIMin 550 mg oral tablet: 1 tab(s) orally 2 times a day (06 Sep 2024 18:28)  spironolactone 25 mg oral tablet: 1 tab(s) orally once a day (06 Sep 2024 18:28)      INPATIENT:  MEDICATIONS  (STANDING):  ascorbic acid 500 milliGRAM(s) Oral daily  dextrose 5%. 1000 milliLiter(s) (100 mL/Hr) IV Continuous <Continuous>  dextrose 5%. 1000 milliLiter(s) (50 mL/Hr) IV Continuous <Continuous>  dextrose 50% Injectable 25 Gram(s) IV Push once  dextrose 50% Injectable 12.5 Gram(s) IV Push once  dextrose 50% Injectable 25 Gram(s) IV Push once  enoxaparin Injectable 40 milliGRAM(s) SubCutaneous every 24 hours  folic acid 1 milliGRAM(s) Oral daily  furosemide    Tablet 40 milliGRAM(s) Oral daily  glucagon  Injectable 1 milliGRAM(s) IntraMuscular once  insulin glargine Injectable (LANTUS) 5 Unit(s) SubCutaneous every morning  insulin lispro (ADMELOG) corrective regimen sliding scale   SubCutaneous three times a day before meals  insulin lispro (ADMELOG) corrective regimen sliding scale   SubCutaneous at bedtime  lactulose Syrup 10 Gram(s) Oral at bedtime  metoprolol tartrate 25 milliGRAM(s) Oral daily  montelukast 10 milliGRAM(s) Oral daily  multivitamin 1 Tablet(s) Oral daily  pantoprazole    Tablet 40 milliGRAM(s) Oral two times a day  rifAXIMin 550 milliGRAM(s) Oral two times a day  spironolactone 25 milliGRAM(s) Oral daily    MEDICATIONS  (PRN):  acetaminophen     Tablet .. 650 milliGRAM(s) Oral every 6 hours PRN Temp greater or equal to 38C (100.4F), Mild Pain (1 - 3)  aluminum hydroxide/magnesium hydroxide/simethicone Suspension 30 milliLiter(s) Oral every 4 hours PRN Dyspepsia  dextrose Oral Gel 15 Gram(s) Oral once PRN Blood Glucose LESS THAN 70 milliGRAM(s)/deciliter  melatonin 3 milliGRAM(s) Oral at bedtime PRN Insomnia    MEDICATIONS  (PRN):  acetaminophen     Tablet .. 650 milliGRAM(s) Oral every 6 hours PRN Temp greater or equal to 38C (100.4F), Mild Pain (1 - 3)  aluminum hydroxide/magnesium hydroxide/simethicone Suspension 30 milliLiter(s) Oral every 4 hours PRN Dyspepsia  dextrose Oral Gel 15 Gram(s) Oral once PRN Blood Glucose LESS THAN 70 milliGRAM(s)/deciliter  melatonin 3 milliGRAM(s) Oral at bedtime PRN Insomnia      REVIEW OF SYSTEMS:  ===============================  ===============================  CONSTITUTIONAL: No weakness, fevers or chills  EYES/ENT: No visual changes;  No vertigo or throat pain   NECK: No pain or stiffness  RESPIRATORY: No cough, wheezing, hemoptysis; No shortness of breath  CARDIOVASCULAR: No chest pain or palpitations  GASTROINTESTINAL: No abdominal or epigastric pain. No nausea, vomiting, or hematemesis;   No diarrhea or constipation. No melena or hematochezia.  GENITOURINARY: No dysuria, frequency or hematuria  NEUROLOGICAL: No numbness or weakness  SKIN: No itching, burning, rashes, or lesions   All other review of systems is negative unless indicated above    Vital Signs Last 24 Hrs  T(C): 36.8 (09 Sep 2024 14:05), Max: 36.8 (09 Sep 2024 14:05)  T(F): 98.2 (09 Sep 2024 14:05), Max: 98.2 (09 Sep 2024 14:05)  HR: 66 (09 Sep 2024 14:05) (57 - 100)  BP: 100/80 (09 Sep 2024 14:05) (100/80 - 120/59)  BP(mean): --  RR: 18 (08 Sep 2024 21:56) (18 - 18)  SpO2: 100% (09 Sep 2024 14:05) (100% - 100%)    Parameters below as of 09 Sep 2024 14:05  Patient On (Oxygen Delivery Method): room air        I&O's Summary      I&O's Detail      PHYSICAL EXAM:    Constitutional: NAD, awake and alert, well-developed  HEENT: PERR, EOMI,  No oral cyananosis.  Neck:  supple,  No JVD  Respiratory: Breath sounds are clear bilaterally, No wheezing, rales or rhonchi  Cardiovascular: S1 and S2, regular rate and rhythm, systolic murmur 2/6, gallops or rubs  Gastrointestinal: Bowel Sounds present, soft, nontender.   Extremities: No peripheral edema. No clubbing or cyanosis.  Vascular: 2+ peripheral pulses  Neurological: A/O x 3, no focal deficits  Musculoskeletal: no calf tenderness.  Skin: No rashes.    ===============================  ===============================  LABS:                         8.2    4.17  )-----------( 121      ( 09 Sep 2024 08:17 )             27.0                         8.6    4.73  )-----------( 120      ( 08 Sep 2024 08:57 )             27.4                         8.3    4.24  )-----------( 128      ( 07 Sep 2024 07:52 )             25.9     09 Sep 2024 08:17    139    |  109    |  38     ----------------------------<  106    4.1     |  22     |  1.27   08 Sep 2024 08:57    137    |  110    |  43     ----------------------------<  163    4.5     |  19     |  1.35   07 Sep 2024 07:52    138    |  108    |  46     ----------------------------<  104    4.4     |  22     |  1.40     Ca    9.3        09 Sep 2024 08:17  Ca    9.4        08 Sep 2024 08:57  Ca    9.4        07 Sep 2024 07:52  Mg     1.7       09 Sep 2024 08:17    TPro  6.3    /  Alb  2.7    /  TBili  1.4    /  DBili  x      /  AST  38     /  ALT  22     /  AlkPhos  110    09 Sep 2024 08:17  TPro  6.9    /  Alb  2.9    /  TBili  1.0    /  DBili  x      /  AST  51     /  ALT  23     /  AlkPhos  105    06 Sep 2024 15:25        THYROID STUDIES:    ===============================  ===============================  CARDIAC BIOMARKERS:  -------  -BNP VALUES:    -------  -TROPONIN VALUES:   Troponin I, High Sensitivity Result: 11.35 ng/L (09-06-24 @ 16:53)  Troponin I, High Sensitivity Result: 11.01 ng/L (09-06-24 @ 15:25)  Troponin I, High Sensitivity Result: 8.00 ng/L (12-12-23 @ 11:10)      ===============================  ===============================  EKG:  sinus bradycardia w/ sinus arrhythmia no ischemic changes.  ===============================  ===============================

## 2024-09-09 NOTE — DISCHARGE NOTE PROVIDER - NSDCMRMEDTOKEN_GEN_ALL_CORE_FT
ascorbic acid 500 mg oral tablet: 1 tab(s) orally once a day  folic acid 1 mg oral tablet: 1 tab(s) orally once a day  furosemide 40 mg oral tablet: 1 tab(s) orally once a day  lactulose 10 g/15 mL oral and rectal liquid: 10 gram(s) orally once a day  loratadine 10 mg oral tablet: 1 tab(s) orally once a day  Metoprolol Tartrate 25 mg oral tablet: 1 tab(s) orally once a day  montelukast 10 mg oral tablet: 1 tab(s) orally once a day  Multiple Vitamins oral tablet: 1 tab(s) orally 2 times a day  pantoprazole 40 mg oral delayed release tablet: 1 tab(s) orally 2 times a day  rifAXIMin 550 mg oral tablet: 1 tab(s) orally 2 times a day  spironolactone 25 mg oral tablet: 1 tab(s) orally once a day

## 2024-09-09 NOTE — DISCHARGE NOTE PROVIDER - HOSPITAL COURSE
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  Hemoglobin on admission 6.2 and responded appropriately to the eights.  Hemoglobin on discharge 8.2.  Patient was orthostatic negative x 2 however noted to be bradycardic during examination to the high 20s per RN.  Patient was placed on remote telemetry overnight and never went below 56.  Likely bradycardia episode to the 20s was an error as patient was asymptomatic awake and alert without chest pain or shortness of breath.  Patient is been recently diagnosed with A-fib within the last month and a half and follows with an outpatient cardiologist who is recommended by cardiologist Dr. Alonzo that he follow-up for repeat echo as an outpatient due to likely flow murmur and use transfusion thresholds of 8 in the future [facility]. anticoagulation continues to be deferred at this time due to anemia.  Anemia is currently being worked up [facility] Sagastume.  "Patient plans to follow-up with later this week on Wed.    #Symptomatic Anemia  #Presyncope  #Dizziness and collapse  #sinus bradycardia  #systolic murmur  - Hgb on admission 6.2, s/p 2pRBC in ED   Hemoglobin this morning 8.2  Patient asymptomatic  Sinus bradycardia in the 50s is unlikely reason for presyncope  More likely due to anemia for which she received 2 units  Cardiology consult: Echo as an outpatient for murmur; consider transfusion threshold of 8; follow-up with outpatient cardiology regarding anticoagulation for A-fib    #hx UGIB iso AVMs s/p cautery   - [facility] Dr. Lee Posada [phone]  - LENNOX negative as above, do not suspect GIB   - Per discussion with outside GI, patient is presently on octreotide every month as this may help with AVM's gastropathy   - also being worked up by [facility] heme[facility]onc for possible MDS  [facility] of this week    #compensated cirrhosis   #hx hepatic encephalopathy   - c/w home lactulose and rifaximin   - no evidence of asterixis or abdominal distension, patient AOx3    #HTN  #atrial fibrillation, not on AC   - CHADSVASC > 3 but AC has been deferred in the past given patient's longstanding hx of anemia   - c/w home Metoprolol, home lasix     Dispo  - Patient has [facility] ([facility] are [facility]), already active.  - Lives at home with daughter

## 2024-09-17 DIAGNOSIS — R01.1 CARDIAC MURMUR, UNSPECIFIED: ICD-10-CM

## 2024-09-17 DIAGNOSIS — D64.9 ANEMIA, UNSPECIFIED: ICD-10-CM

## 2024-09-17 DIAGNOSIS — R55 SYNCOPE AND COLLAPSE: ICD-10-CM

## 2024-09-17 DIAGNOSIS — K74.60 UNSPECIFIED CIRRHOSIS OF LIVER: ICD-10-CM

## 2024-09-17 DIAGNOSIS — R00.1 BRADYCARDIA, UNSPECIFIED: ICD-10-CM

## 2024-09-17 DIAGNOSIS — I48.91 UNSPECIFIED ATRIAL FIBRILLATION: ICD-10-CM

## 2024-09-17 DIAGNOSIS — I10 ESSENTIAL (PRIMARY) HYPERTENSION: ICD-10-CM

## 2024-09-17 DIAGNOSIS — Z88.0 ALLERGY STATUS TO PENICILLIN: ICD-10-CM

## 2024-09-17 DIAGNOSIS — Z79.899 OTHER LONG TERM (CURRENT) DRUG THERAPY: ICD-10-CM

## 2024-09-17 DIAGNOSIS — Z91.018 ALLERGY TO OTHER FOODS: ICD-10-CM

## 2025-04-03 ENCOUNTER — INPATIENT (INPATIENT)
Facility: HOSPITAL | Age: 80
LOS: 5 days | Discharge: HOME | DRG: 368 | End: 2025-04-09
Attending: HOSPITALIST | Admitting: STUDENT IN AN ORGANIZED HEALTH CARE EDUCATION/TRAINING PROGRAM
Payer: MEDICARE

## 2025-04-03 VITALS
OXYGEN SATURATION: 100 % | HEIGHT: 68 IN | DIASTOLIC BLOOD PRESSURE: 62 MMHG | RESPIRATION RATE: 18 BRPM | HEART RATE: 75 BPM | WEIGHT: 186.95 LBS | TEMPERATURE: 98 F | SYSTOLIC BLOOD PRESSURE: 118 MMHG

## 2025-04-03 DIAGNOSIS — K92.2 GASTROINTESTINAL HEMORRHAGE, UNSPECIFIED: ICD-10-CM

## 2025-04-03 LAB
ACANTHOCYTES BLD QL SMEAR: SLIGHT — SIGNIFICANT CHANGE UP
ALBUMIN SERPL ELPH-MCNC: 3.2 G/DL — LOW (ref 3.3–5)
ALP SERPL-CCNC: 68 U/L — SIGNIFICANT CHANGE UP (ref 40–120)
ALT FLD-CCNC: 14 U/L — SIGNIFICANT CHANGE UP (ref 12–78)
ANION GAP SERPL CALC-SCNC: 6 MMOL/L — SIGNIFICANT CHANGE UP (ref 5–17)
ANISOCYTOSIS BLD QL: ABNORMAL
APTT BLD: 37.9 SEC — HIGH (ref 24.5–35.6)
AST SERPL-CCNC: 24 U/L — SIGNIFICANT CHANGE UP (ref 15–37)
BASOPHILS # BLD AUTO: 0.01 K/UL — SIGNIFICANT CHANGE UP (ref 0–0.2)
BASOPHILS NFR BLD AUTO: 0.2 % — SIGNIFICANT CHANGE UP (ref 0–2)
BILIRUB SERPL-MCNC: 1.7 MG/DL — HIGH (ref 0.2–1.2)
BLD GP AB SCN SERPL QL: SIGNIFICANT CHANGE UP
BUN SERPL-MCNC: 60 MG/DL — HIGH (ref 7–23)
CALCIUM SERPL-MCNC: 9.4 MG/DL — SIGNIFICANT CHANGE UP (ref 8.5–10.1)
CHLORIDE SERPL-SCNC: 107 MMOL/L — SIGNIFICANT CHANGE UP (ref 96–108)
CO2 SERPL-SCNC: 24 MMOL/L — SIGNIFICANT CHANGE UP (ref 22–31)
CREAT SERPL-MCNC: 1.64 MG/DL — HIGH (ref 0.5–1.3)
DACRYOCYTES BLD QL SMEAR: SLIGHT — SIGNIFICANT CHANGE UP
EGFR: 42 ML/MIN/1.73M2 — LOW
EGFR: 42 ML/MIN/1.73M2 — LOW
ELLIPTOCYTES BLD QL SMEAR: SLIGHT — SIGNIFICANT CHANGE UP
EOSINOPHIL # BLD AUTO: 0.2 K/UL — SIGNIFICANT CHANGE UP (ref 0–0.5)
EOSINOPHIL NFR BLD AUTO: 3.5 % — SIGNIFICANT CHANGE UP (ref 0–6)
GLUCOSE SERPL-MCNC: 154 MG/DL — HIGH (ref 70–99)
HCT VFR BLD CALC: 16 % — CRITICAL LOW (ref 39–50)
HGB BLD-MCNC: 5.1 G/DL — CRITICAL LOW (ref 13–17)
HYPOCHROMIA BLD QL: SLIGHT — SIGNIFICANT CHANGE UP
IMM GRANULOCYTES # BLD AUTO: 0.03 K/UL — SIGNIFICANT CHANGE UP (ref 0–0.07)
IMM GRANULOCYTES NFR BLD AUTO: 0.5 % — SIGNIFICANT CHANGE UP (ref 0–0.9)
IMMATURE PLATELET FRACTION #: 2.2 K/UL — LOW (ref 3.9–12.5)
IMMATURE PLATELET FRACTION %: 4.4 % — SIGNIFICANT CHANGE UP (ref 1.6–7.1)
INR BLD: 1.55 RATIO — HIGH (ref 0.85–1.16)
LYMPHOCYTES # BLD AUTO: 0.62 K/UL — LOW (ref 1–3.3)
LYMPHOCYTES NFR BLD AUTO: 11 % — LOW (ref 13–44)
MACROCYTES BLD QL: ABNORMAL
MAGNESIUM SERPL-MCNC: 1.8 MG/DL — SIGNIFICANT CHANGE UP (ref 1.6–2.6)
MANUAL SMEAR VERIFICATION: SIGNIFICANT CHANGE UP
MCHC RBC-ENTMCNC: 31.9 G/DL — LOW (ref 32–36)
MCHC RBC-ENTMCNC: 35.9 PG — HIGH (ref 27–34)
MCV RBC AUTO: 112.7 FL — HIGH (ref 80–100)
MONOCYTES # BLD AUTO: 0.26 K/UL — SIGNIFICANT CHANGE UP (ref 0–0.9)
MONOCYTES NFR BLD AUTO: 4.6 % — SIGNIFICANT CHANGE UP (ref 2–14)
NEUTROPHILS # BLD AUTO: 4.53 K/UL — SIGNIFICANT CHANGE UP (ref 1.8–7.4)
NEUTROPHILS NFR BLD AUTO: 80.2 % — HIGH (ref 43–77)
NRBC # BLD AUTO: 0 K/UL — SIGNIFICANT CHANGE UP (ref 0–0)
NRBC # FLD: 0 K/UL — SIGNIFICANT CHANGE UP (ref 0–0)
NRBC BLD AUTO-RTO: 0 /100 WBCS — SIGNIFICANT CHANGE UP (ref 0–0)
NT-PROBNP SERPL-SCNC: 912 PG/ML — HIGH (ref 0–450)
PLAT MORPH BLD: NORMAL — SIGNIFICANT CHANGE UP
PLATELET # BLD AUTO: 49 K/UL — LOW (ref 150–400)
PMV BLD: 11.8 FL — SIGNIFICANT CHANGE UP (ref 7–13)
POIKILOCYTOSIS BLD QL AUTO: ABNORMAL
POTASSIUM SERPL-MCNC: 4.4 MMOL/L — SIGNIFICANT CHANGE UP (ref 3.5–5.3)
POTASSIUM SERPL-SCNC: 4.4 MMOL/L — SIGNIFICANT CHANGE UP (ref 3.5–5.3)
PROT SERPL-MCNC: 6 GM/DL — SIGNIFICANT CHANGE UP (ref 6–8.3)
PROTHROM AB SERPL-ACNC: 18.2 SEC — HIGH (ref 9.9–13.4)
RBC # BLD: 1.42 M/UL — LOW (ref 4.2–5.8)
RBC # FLD: 21.2 % — HIGH (ref 10.3–14.5)
RBC BLD AUTO: ABNORMAL
SODIUM SERPL-SCNC: 137 MMOL/L — SIGNIFICANT CHANGE UP (ref 135–145)
TROPONIN I, HIGH SENSITIVITY RESULT: 23.06 NG/L — SIGNIFICANT CHANGE UP
WBC # BLD: 5.65 K/UL — SIGNIFICANT CHANGE UP (ref 3.8–10.5)
WBC # FLD AUTO: 5.65 K/UL — SIGNIFICANT CHANGE UP (ref 3.8–10.5)
WBC MORPHOLOGY: NORMAL — SIGNIFICANT CHANGE UP

## 2025-04-03 PROCEDURE — 83605 ASSAY OF LACTIC ACID: CPT

## 2025-04-03 PROCEDURE — 93005 ELECTROCARDIOGRAM TRACING: CPT

## 2025-04-03 PROCEDURE — 99223 1ST HOSP IP/OBS HIGH 75: CPT

## 2025-04-03 PROCEDURE — P9100: CPT

## 2025-04-03 PROCEDURE — 85025 COMPLETE CBC W/AUTO DIFF WBC: CPT

## 2025-04-03 PROCEDURE — 80053 COMPREHEN METABOLIC PANEL: CPT

## 2025-04-03 PROCEDURE — 93010 ELECTROCARDIOGRAM REPORT: CPT

## 2025-04-03 PROCEDURE — P9047: CPT | Mod: JZ

## 2025-04-03 PROCEDURE — C1729: CPT

## 2025-04-03 PROCEDURE — 36600 WITHDRAWAL OF ARTERIAL BLOOD: CPT

## 2025-04-03 PROCEDURE — 85379 FIBRIN DEGRADATION QUANT: CPT

## 2025-04-03 PROCEDURE — 74178 CT ABD&PLV WO CNTR FLWD CNTR: CPT | Mod: 26

## 2025-04-03 PROCEDURE — P9037: CPT

## 2025-04-03 PROCEDURE — P9016: CPT

## 2025-04-03 PROCEDURE — 36415 COLL VENOUS BLD VENIPUNCTURE: CPT

## 2025-04-03 PROCEDURE — 86140 C-REACTIVE PROTEIN: CPT

## 2025-04-03 PROCEDURE — 76705 ECHO EXAM OF ABDOMEN: CPT

## 2025-04-03 PROCEDURE — 97530 THERAPEUTIC ACTIVITIES: CPT | Mod: GP

## 2025-04-03 PROCEDURE — 0241U: CPT

## 2025-04-03 PROCEDURE — 80076 HEPATIC FUNCTION PANEL: CPT

## 2025-04-03 PROCEDURE — 86923 COMPATIBILITY TEST ELECTRIC: CPT

## 2025-04-03 PROCEDURE — 85027 COMPLETE CBC AUTOMATED: CPT

## 2025-04-03 PROCEDURE — 49083 ABD PARACENTESIS W/IMAGING: CPT

## 2025-04-03 PROCEDURE — 85610 PROTHROMBIN TIME: CPT

## 2025-04-03 PROCEDURE — 99285 EMERGENCY DEPT VISIT HI MDM: CPT

## 2025-04-03 PROCEDURE — 97116 GAIT TRAINING THERAPY: CPT | Mod: GP

## 2025-04-03 PROCEDURE — 80048 BASIC METABOLIC PNL TOTAL CA: CPT

## 2025-04-03 PROCEDURE — P9040: CPT

## 2025-04-03 PROCEDURE — 82728 ASSAY OF FERRITIN: CPT

## 2025-04-03 PROCEDURE — 71045 X-RAY EXAM CHEST 1 VIEW: CPT | Mod: 26

## 2025-04-03 PROCEDURE — 81003 URINALYSIS AUTO W/O SCOPE: CPT

## 2025-04-03 PROCEDURE — 71250 CT THORAX DX C-: CPT | Mod: MC

## 2025-04-03 PROCEDURE — 36430 TRANSFUSION BLD/BLD COMPNT: CPT

## 2025-04-03 PROCEDURE — 86850 RBC ANTIBODY SCREEN: CPT

## 2025-04-03 PROCEDURE — 82803 BLOOD GASES ANY COMBINATION: CPT

## 2025-04-03 PROCEDURE — 71045 X-RAY EXAM CHEST 1 VIEW: CPT

## 2025-04-03 PROCEDURE — 94761 N-INVAS EAR/PLS OXIMETRY MLT: CPT

## 2025-04-03 PROCEDURE — C1889: CPT

## 2025-04-03 PROCEDURE — 87040 BLOOD CULTURE FOR BACTERIA: CPT

## 2025-04-03 PROCEDURE — 86900 BLOOD TYPING SEROLOGIC ABO: CPT

## 2025-04-03 PROCEDURE — 83615 LACTATE (LD) (LDH) ENZYME: CPT

## 2025-04-03 PROCEDURE — 97162 PT EVAL MOD COMPLEX 30 MIN: CPT | Mod: GP

## 2025-04-03 PROCEDURE — 86901 BLOOD TYPING SEROLOGIC RH(D): CPT

## 2025-04-03 PROCEDURE — 82140 ASSAY OF AMMONIA: CPT

## 2025-04-03 RX ORDER — SUCRALFATE 1 G
1 TABLET ORAL
Refills: 0 | Status: DISCONTINUED | OUTPATIENT
Start: 2025-04-03 | End: 2025-04-09

## 2025-04-03 RX ORDER — CEFTRIAXONE 500 MG/1
1000 INJECTION, POWDER, FOR SOLUTION INTRAMUSCULAR; INTRAVENOUS ONCE
Refills: 0 | Status: COMPLETED | OUTPATIENT
Start: 2025-04-03 | End: 2025-04-03

## 2025-04-03 RX ORDER — OCTREOTIDE ACETATE 500 UG/ML
50 INJECTION, SOLUTION INTRAVENOUS; SUBCUTANEOUS ONCE
Refills: 0 | Status: COMPLETED | OUTPATIENT
Start: 2025-04-03 | End: 2025-04-03

## 2025-04-03 RX ORDER — CEFTRIAXONE 500 MG/1
1000 INJECTION, POWDER, FOR SOLUTION INTRAMUSCULAR; INTRAVENOUS ONCE
Refills: 0 | Status: DISCONTINUED | OUTPATIENT
Start: 2025-04-03 | End: 2025-04-03

## 2025-04-03 RX ORDER — MAGNESIUM, ALUMINUM HYDROXIDE 200-200 MG
30 TABLET,CHEWABLE ORAL EVERY 4 HOURS
Refills: 0 | Status: DISCONTINUED | OUTPATIENT
Start: 2025-04-03 | End: 2025-04-09

## 2025-04-03 RX ORDER — ONDANSETRON HCL/PF 4 MG/2 ML
4 VIAL (ML) INJECTION EVERY 8 HOURS
Refills: 0 | Status: DISCONTINUED | OUTPATIENT
Start: 2025-04-03 | End: 2025-04-09

## 2025-04-03 RX ORDER — ACETAMINOPHEN 500 MG/5ML
650 LIQUID (ML) ORAL EVERY 6 HOURS
Refills: 0 | Status: DISCONTINUED | OUTPATIENT
Start: 2025-04-03 | End: 2025-04-09

## 2025-04-03 RX ORDER — MELATONIN 5 MG
3 TABLET ORAL AT BEDTIME
Refills: 0 | Status: DISCONTINUED | OUTPATIENT
Start: 2025-04-03 | End: 2025-04-09

## 2025-04-03 RX ADMIN — Medication 1000 MILLILITER(S): at 16:40

## 2025-04-03 RX ADMIN — OCTREOTIDE ACETATE 50 MICROGRAM(S): 500 INJECTION, SOLUTION INTRAVENOUS; SUBCUTANEOUS at 20:51

## 2025-04-03 RX ADMIN — Medication 80 MILLIGRAM(S): at 20:18

## 2025-04-03 NOTE — ED PROVIDER NOTE - CLINICAL SUMMARY MEDICAL DECISION MAKING FREE TEXT BOX
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 No syncope but almost passed out. Denies bloody stools. No infectious symtpoms. no cp or sob. +decreased appetite today. was burying wife today. ROS otherwise neg.    slightly tachycardic w/ soft bps    r/o anemia, acs, infeciton  labs including type and screen, ekg, cxr, reassess

## 2025-04-03 NOTE — H&P ADULT - HISTORY OF PRESENT ILLNESS
HPI:  80 YO M with hepatic encephalopathy,, HTN, recent diagnosis of afib not on AC, abdominal telangiectasias, cirrhosis. Pt presenting to  with daughter at bedside, According to her pt started having lightnedness and near syncope yesterday. Pt also had 2 episodes of emesis that was non bloody non bilious. Pt denies abdominal pain, diarrhea, bloody stools or dark stools. In the ED did have hypotension that improved with fluids. hemoglobin was 5.1, plt 49. 2 u prbc and 1 u plt ordered CT abdomen negative for gi bleed. Pt given Protonix and octreotide. and admitted.      PAST MEDICAL & SURGICAL HISTORY:  HTN (hypertension)      Hepatic encephalopathy        FAMILY HISTORY:  FH: HTN (hypertension)      Social History:      Allergies    penicillin (Unknown)  Mushrooms (Unknown)    Intolerances        MEDICATIONS  (STANDING):  cefTRIAXone Injectable. 1000 milliGRAM(s) IV Push once  pantoprazole  Injectable 40 milliGRAM(s) IV Push two times a day  sucralfate 1 Gram(s) Oral four times a day    MEDICATIONS  (PRN):  acetaminophen     Tablet .. 650 milliGRAM(s) Oral every 6 hours PRN Temp greater or equal to 38C (100.4F), Mild Pain (1 - 3)  aluminum hydroxide/magnesium hydroxide/simethicone Suspension 30 milliLiter(s) Oral every 4 hours PRN Dyspepsia  melatonin 3 milliGRAM(s) Oral at bedtime PRN Insomnia  ondansetron Injectable 4 milliGRAM(s) IV Push every 8 hours PRN Nausea and/or Vomiting      ROS:  10 point ROS negative except for ones mentioned in HPI    PE   HEENT:  Head is normocephalic    Skin:  Warm and dry without any rash   NECK:  Supple without lymphadenopathy.   HEART:  Regular rate and rhythm. normal S1 and S2, No M/R/G  LUNGS:  Good ins/exp effort, no W/R/R/C  ABDOMEN: distended abdomen , no tenderness  EXTREMITIES:  Without cyanosis, clubbing or edema.   NEUROLOGICAL:  Gross nonfocal      PEx  T(C): 36.5 (04-03-25 @ 20:40), Max: 36.6 (04-03-25 @ 20:14)  HR: 94 (04-03-25 @ 20:40) (65 - 97)  BP: 97/48 (04-03-25 @ 20:40) (97/48 - 123/55)  RR: 28 (04-03-25 @ 20:40) (16 - 28)  SpO2: 98% (04-03-25 @ 20:40) (98% - 100%)  Wt(kg): --                        5.1    5.65  )-----------( 49       ( 03 Apr 2025 16:37 )             16.0     04-03    137  |  107  |  60[H]  ----------------------------<  154[H]  4.4   |  24  |  1.64[H]    Ca    9.4      03 Apr 2025 17:36  Mg     1.8     04-03    TPro  6.0  /  Alb  3.2[L]  /  TBili  1.7[H]  /  DBili  x   /  AST  24  /  ALT  14  /  AlkPhos  68  04-03    CAPILLARY BLOOD GLUCOSE      POCT Blood Glucose.: 153 mg/dL (03 Apr 2025 15:00)    PT/INR - ( 03 Apr 2025 17:37 )   PT: 18.2 sec;   INR: 1.55 ratio         PTT - ( 03 Apr 2025 17:37 )  PTT:37.9 sec  Urinalysis Basic - ( 03 Apr 2025 17:36 )    Color: x / Appearance: x / SG: x / pH: x  Gluc: 154 mg/dL / Ketone: x  / Bili: x / Urobili: x   Blood: x / Protein: x / Nitrite: x   Leuk Esterase: x / RBC: x / WBC x   Sq Epi: x / Non Sq Epi: x / Bacteria: x                Radiology/Imaging, I have personally reviewed:      IMPRESSION:    1. No acute/active GI bleeding.    2. Findings of cirrhosis with portal hypertension including mild to   moderate ascites. Very small upper abdominal varices. No esophageal   varices demonstrated.    3. Small left pleural effusion with some adjacent atelectasis. Trace   right pleural effusion.

## 2025-04-03 NOTE — ED PROVIDER NOTE - PHYSICAL EXAMINATION
Constitutional: pale, a lert  HEENT: NCAT, EOMi, PERRL  Cardiac: RRR no MRG  Resp: clear, no wheezing or crackles  GI: ab soft ntnd, no r/g  MSK/Ext:b/l edema  Skin: No rashes Constitutional: pale but alert,   HEENT: NCAT, EOMi, PERRL, pale conjunctiva  Cardiac: RRR no MRG  Resp: clear, no wheezing or crackles  GI: ab soft ntnd, no r/g  : black stool, guiaic pos  MSK/Ext:b/l edema  Skin: No rashes

## 2025-04-03 NOTE — ED ADULT TRIAGE NOTE - CHIEF COMPLAINT QUOTE
pt BIBA from home complaining of dizziness and nausea after coming home from his wifes  today.  pt pale appearing in triage.  pt had demand pacemaker placed in February.  taken for EKG.

## 2025-04-03 NOTE — H&P ADULT - ASSESSMENT
80 YO M with hepatic encephalopathy,, HTN,  afib not on AC, abdominal telangiectasias, cirrhosis. Pt presenting to  with daughter at bedside, According to her pt started having lightnedness and near syncope yesterday. Pt also had 2 episodes of emesis that was non bloody non bilious. Pt denies abdominal pain, diarrhea, bloody stools or dark stools. In the ED did have hypotension that improved with fluids. hemoglobin was 5.1, plt 49. 2 u prbc and 1 u plt ordered CT abdomen negative for gi bleed. Pt given Protonix and octreotide. and admitted.     Assessment/Plan:    # suspected GI bleed  # cirrhosis  # thrombocytopenia   # hx of hepatic encephalopathy     - CT abdomen negative for bleed  - Protonix BID, carafate 1gm TID  - 2 u prbc, 1 unit plt ordered, repeat cbc  - Will hold all hypertensive meds as Map is low 70s  - holding lactulose and rifaxamin for tonight due to possible bleed  - GI consult      # BRENNAN 2/2 above    - monitor cr   - should improve with transfusions     # mild to moderate ascites seen on CT    - will hold eval for possible paracentesis once pt is more stable       Code status: Full  Diet: NPO  DVT ppx SCDs

## 2025-04-03 NOTE — ED PROVIDER NOTE - PROGRESS NOTE DETAILS
discussed with dr. quan. patient with hx xof cirrhosis w/ c/f ugib. given ppi, octreotide, fluids, 2 uprbc, 1 unit plt. gib consult placed no active bleed on ct. hds.

## 2025-04-03 NOTE — PATIENT PROFILE ADULT - NSPROPOAURINARYCATHETER_GEN_A_NUR
Hi Dr Sun,  Man was seen for his annual assessment. He has had a slow decline. He would benefit by going to the adult day care with his wife 1 more day a week. He will be into see you for his wellness visit 3/25/25.  No other concerns at this time.  Thanks,  YASMIN Rich no

## 2025-04-03 NOTE — ED ADULT NURSE NOTE - OBJECTIVE STATEMENT
Pt is 78 yo male A&Ox4 c/o dizziness. Pt reports getting out of his car when he felt dizzy and then fell back into the car. Pt denies LOC, head strike, cp, headache. Pt endorses nausea. As per daughter at bedside, pt has been having intermittent  episodes of nausea and decreased PO. PMHx hepatic encephelopathy, pacemaker, recently [facility]. Pt planned for paracentesis for next week.

## 2025-04-04 LAB
ALBUMIN SERPL ELPH-MCNC: 3 G/DL — LOW (ref 3.3–5)
ALP SERPL-CCNC: 64 U/L — SIGNIFICANT CHANGE UP (ref 40–120)
ALT FLD-CCNC: 13 U/L — SIGNIFICANT CHANGE UP (ref 12–78)
AMMONIA BLD-MCNC: 37 UMOL/L — HIGH (ref 11–32)
ANION GAP SERPL CALC-SCNC: 7 MMOL/L — SIGNIFICANT CHANGE UP (ref 5–17)
AST SERPL-CCNC: 25 U/L — SIGNIFICANT CHANGE UP (ref 15–37)
BILIRUB SERPL-MCNC: 3.2 MG/DL — HIGH (ref 0.2–1.2)
BUN SERPL-MCNC: 54 MG/DL — HIGH (ref 7–23)
CALCIUM SERPL-MCNC: 9.1 MG/DL — SIGNIFICANT CHANGE UP (ref 8.5–10.1)
CHLORIDE SERPL-SCNC: 109 MMOL/L — HIGH (ref 96–108)
CO2 SERPL-SCNC: 22 MMOL/L — SIGNIFICANT CHANGE UP (ref 22–31)
CREAT SERPL-MCNC: 1.65 MG/DL — HIGH (ref 0.5–1.3)
EGFR: 42 ML/MIN/1.73M2 — LOW
EGFR: 42 ML/MIN/1.73M2 — LOW
GLUCOSE SERPL-MCNC: 138 MG/DL — HIGH (ref 70–99)
HCT VFR BLD CALC: 18.7 % — CRITICAL LOW (ref 39–50)
HCT VFR BLD CALC: 21.9 % — LOW (ref 39–50)
HGB BLD-MCNC: 6.2 G/DL — CRITICAL LOW (ref 13–17)
HGB BLD-MCNC: 7.2 G/DL — LOW (ref 13–17)
IMMATURE PLATELET FRACTION #: 1.8 K/UL — LOW (ref 3.9–12.5)
IMMATURE PLATELET FRACTION #: 1.8 K/UL — LOW (ref 3.9–12.5)
IMMATURE PLATELET FRACTION %: 1.9 % — SIGNIFICANT CHANGE UP (ref 1.6–7.1)
IMMATURE PLATELET FRACTION %: 2.1 % — SIGNIFICANT CHANGE UP (ref 1.6–7.1)
MCHC RBC-ENTMCNC: 31.3 PG — SIGNIFICANT CHANGE UP (ref 27–34)
MCHC RBC-ENTMCNC: 32.6 PG — SIGNIFICANT CHANGE UP (ref 27–34)
MCHC RBC-ENTMCNC: 32.9 G/DL — SIGNIFICANT CHANGE UP (ref 32–36)
MCHC RBC-ENTMCNC: 33.2 G/DL — SIGNIFICANT CHANGE UP (ref 32–36)
MCV RBC AUTO: 95.2 FL — SIGNIFICANT CHANGE UP (ref 80–100)
MCV RBC AUTO: 98.4 FL — SIGNIFICANT CHANGE UP (ref 80–100)
NRBC # BLD AUTO: 0 K/UL — SIGNIFICANT CHANGE UP (ref 0–0)
NRBC # BLD AUTO: 0.02 K/UL — HIGH (ref 0–0)
NRBC # FLD: 0 K/UL — SIGNIFICANT CHANGE UP (ref 0–0)
NRBC # FLD: 0.02 K/UL — HIGH (ref 0–0)
NRBC BLD AUTO-RTO: 0 /100 WBCS — SIGNIFICANT CHANGE UP (ref 0–0)
NRBC BLD AUTO-RTO: 0 /100 WBCS — SIGNIFICANT CHANGE UP (ref 0–0)
PLATELET # BLD AUTO: 85 K/UL — LOW (ref 150–400)
PLATELET # BLD AUTO: 95 K/UL — LOW (ref 150–400)
PMV BLD: 10.7 FL — SIGNIFICANT CHANGE UP (ref 7–13)
PMV BLD: 11.1 FL — SIGNIFICANT CHANGE UP (ref 7–13)
POTASSIUM SERPL-MCNC: 4.1 MMOL/L — SIGNIFICANT CHANGE UP (ref 3.5–5.3)
POTASSIUM SERPL-SCNC: 4.1 MMOL/L — SIGNIFICANT CHANGE UP (ref 3.5–5.3)
PROT SERPL-MCNC: 5.9 GM/DL — LOW (ref 6–8.3)
RBC # BLD: 1.9 M/UL — LOW (ref 4.2–5.8)
RBC # BLD: 2.3 M/UL — LOW (ref 4.2–5.8)
RBC # FLD: 23.7 % — HIGH (ref 10.3–14.5)
RBC # FLD: 24.1 % — HIGH (ref 10.3–14.5)
SODIUM SERPL-SCNC: 138 MMOL/L — SIGNIFICANT CHANGE UP (ref 135–145)
WBC # BLD: 4.78 K/UL — SIGNIFICANT CHANGE UP (ref 3.8–10.5)
WBC # BLD: 5.08 K/UL — SIGNIFICANT CHANGE UP (ref 3.8–10.5)
WBC # FLD AUTO: 4.78 K/UL — SIGNIFICANT CHANGE UP (ref 3.8–10.5)
WBC # FLD AUTO: 5.08 K/UL — SIGNIFICANT CHANGE UP (ref 3.8–10.5)

## 2025-04-04 PROCEDURE — 43244 EGD VARICES LIGATION: CPT

## 2025-04-04 PROCEDURE — 93010 ELECTROCARDIOGRAM REPORT: CPT

## 2025-04-04 PROCEDURE — 99233 SBSQ HOSP IP/OBS HIGH 50: CPT

## 2025-04-04 RX ORDER — MONTELUKAST SODIUM 10 MG/1
10 TABLET ORAL DAILY
Refills: 0 | Status: DISCONTINUED | OUTPATIENT
Start: 2025-04-04 | End: 2025-04-09

## 2025-04-04 RX ORDER — OCTREOTIDE ACETATE 500 UG/ML
50 INJECTION, SOLUTION INTRAVENOUS; SUBCUTANEOUS
Qty: 500 | Refills: 0 | Status: DISCONTINUED | OUTPATIENT
Start: 2025-04-04 | End: 2025-04-09

## 2025-04-04 RX ORDER — CEFTRIAXONE 500 MG/1
1000 INJECTION, POWDER, FOR SOLUTION INTRAMUSCULAR; INTRAVENOUS EVERY 24 HOURS
Refills: 0 | Status: DISCONTINUED | OUTPATIENT
Start: 2025-04-04 | End: 2025-04-09

## 2025-04-04 RX ORDER — FUROSEMIDE 10 MG/ML
20 INJECTION INTRAMUSCULAR; INTRAVENOUS DAILY
Refills: 0 | Status: DISCONTINUED | OUTPATIENT
Start: 2025-04-04 | End: 2025-04-06

## 2025-04-04 RX ORDER — B1/B2/B3/B5/B6/B12/VIT C/FOLIC 500-0.5 MG
1 TABLET ORAL DAILY
Refills: 0 | Status: DISCONTINUED | OUTPATIENT
Start: 2025-04-04 | End: 2025-04-09

## 2025-04-04 RX ORDER — FOLIC ACID 1 MG/1
1 TABLET ORAL DAILY
Refills: 0 | Status: DISCONTINUED | OUTPATIENT
Start: 2025-04-04 | End: 2025-04-09

## 2025-04-04 RX ADMIN — Medication 1 TABLET(S): at 08:51

## 2025-04-04 RX ADMIN — CEFTRIAXONE 1000 MILLIGRAM(S): 500 INJECTION, POWDER, FOR SOLUTION INTRAMUSCULAR; INTRAVENOUS at 00:43

## 2025-04-04 RX ADMIN — Medication 500 MILLIGRAM(S): at 08:52

## 2025-04-04 RX ADMIN — Medication 40 MILLIGRAM(S): at 16:31

## 2025-04-04 RX ADMIN — CEFTRIAXONE 1000 MILLIGRAM(S): 500 INJECTION, POWDER, FOR SOLUTION INTRAMUSCULAR; INTRAVENOUS at 21:34

## 2025-04-04 RX ADMIN — Medication 40 MILLIGRAM(S): at 06:36

## 2025-04-04 RX ADMIN — Medication 1 GRAM(S): at 00:42

## 2025-04-04 RX ADMIN — FUROSEMIDE 20 MILLIGRAM(S): 10 INJECTION INTRAMUSCULAR; INTRAVENOUS at 08:51

## 2025-04-04 RX ADMIN — Medication 10 MILLIGRAM(S): at 08:52

## 2025-04-04 RX ADMIN — OCTREOTIDE ACETATE 10 MICROGRAM(S)/HR: 500 INJECTION, SOLUTION INTRAVENOUS; SUBCUTANEOUS at 18:16

## 2025-04-04 RX ADMIN — FOLIC ACID 1 MILLIGRAM(S): 1 TABLET ORAL at 08:52

## 2025-04-04 RX ADMIN — Medication 1 GRAM(S): at 06:36

## 2025-04-04 NOTE — CONSULT NOTE ADULT - SUBJECTIVE AND OBJECTIVE BOX
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 On admission, hypotensive with good response to IVF. Hgb 5.1 with platelets 49k on arrival s/p transfusion two units PRBC and one unit platelets with f/u AM Hgb 6.6. BUN/cr ratio 54/1.65 this morning. To be transfused additional unit this morning. Patient is A&Ox3. Relatively good historian. Patient states he thinks he had dark stool yesterday, RN this morning reporting dark brown formed stool this morning. Endorses GI, Dr. I[facility]bal (sp?) [facility] with collection of stool per patient "no blood in stool." EGD 12/2023 here in hospital without evidence of esophageal varices, multiple small gastric AVM's s/p APC.      PAST MEDICAL & SURGICAL HISTORY:  HTN (hypertension)    Hepatic encephalopathy    MEDICATIONS  (STANDING):  ascorbic acid 500 milliGRAM(s) Oral daily  cefTRIAXone Injectable. 1000 milliGRAM(s) IV Push every 24 hours  cetirizine 10 milliGRAM(s) Oral daily  folic acid 1 milliGRAM(s) Oral daily  furosemide   Injectable 20 milliGRAM(s) IV Push daily  montelukast 10 milliGRAM(s) Oral daily  multivitamin 1 Tablet(s) Oral daily  pantoprazole  Injectable 40 milliGRAM(s) IV Push two times a day  sucralfate 1 Gram(s) Oral four times a day    MEDICATIONS  (PRN):  acetaminophen     Tablet .. 650 milliGRAM(s) Oral every 6 hours PRN Temp greater or equal to 38C (100.4F), Mild Pain (1 - 3)  aluminum hydroxide/magnesium hydroxide/simethicone Suspension 30 milliLiter(s) Oral every 4 hours PRN Dyspepsia  melatonin 3 milliGRAM(s) Oral at bedtime PRN Insomnia  ondansetron Injectable 4 milliGRAM(s) IV Push every 8 hours PRN Nausea and/or Vomiting      Allergies    penicillin (Rash)  Mushrooms (Anaphylaxis (Severe))    Intolerances        SOCIAL HISTORY:    FAMILY HISTORY:  FH: HTN (hypertension)        REVIEW OF SYSTEMS:    CONSTITUTIONAL: No weakness, fevers or chills  EYES/ENT: No visual changes;  No vertigo or throat pain   NECK: No pain or stiffness  RESPIRATORY: No cough, wheezing, hemoptysis; No shortness of breath  CARDIOVASCULAR: No chest pain or palpitations  GASTROINTESTINAL: See HPI  GENITOURINARY: No dysuria, frequency or hematuria  NEUROLOGICAL: No numbness or weakness  SKIN: No itching, burning, rashes, or lesions   PSYCH: Normal mood and affect  All other review of systems is negative unless indicated above.    Vital Signs Last 24 Hrs  T(C): 36.5 (04 Apr 2025 14:00), Max: 36.8 (03 Apr 2025 23:29)  T(F): 97.7 (04 Apr 2025 14:00), Max: 98.2 (03 Apr 2025 23:29)  HR: 98 (04 Apr 2025 14:00) (65 - 99)  BP: 127/65 (04 Apr 2025 14:00) (97/48 - 127/65)  BP(mean): 78 (03 Apr 2025 23:05) (63 - 83)  RR: 18 (04 Apr 2025 14:00) (16 - 28)  SpO2: 100% (04 Apr 2025 14:00) (96% - 100%)    Parameters below as of 04 Apr 2025 14:00  Patient On (Oxygen Delivery Method): room air    PHYSICAL EXAM:    Constitutional: No acute distress, pale, non-toxic appearing  HEENT: good phonation, not icteric  Neck: supple, no lymphadenopathy  Respiratory: clear to ascultation bilaterally, no wheezing  Cardiovascular: S1 and S2, regular rate and rhythm, no murmurs rubs or gallops  Gastrointestinal: soft, non-tender, non-distended, +bowel sounds, no rebound or guarding, no surgical scars, no drains  Extremities: No peripheral edema, no cyanosis or clubbing  Vascular: 2+ peripheral pulses, no venous stasis  Neurological: A/O x 3, no focal deficits, no asterixis  Psychiatric: Normal mood, normal affect  Skin: No rashes, not jaundiced    LABS:                        6.2    4.78  )-----------( 95       ( 04 Apr 2025 06:50 )             18.7     04-04    138  |  109[H]  |  54[H]  ----------------------------<  138[H]  4.1   |  22  |  1.65[H]    Ca    9.1      04 Apr 2025 06:50  Mg     1.8     04-03    TPro  5.9[L]  /  Alb  3.0[L]  /  TBili  3.2[H]  /  DBili  x   /  AST  25  /  ALT  13  /  AlkPhos  64  04-04    PT/INR - ( 03 Apr 2025 17:37 )   PT: 18.2 sec;   INR: 1.55 ratio         PTT - ( 03 Apr 2025 17:37 )  PTT:37.9 sec  LIVER FUNCTIONS - ( 04 Apr 2025 06:50 )  Alb: 3.0 g/dL / Pro: 5.9 gm/dL / ALK PHOS: 64 U/L / ALT: 13 U/L / AST: 25 U/L / GGT: x             RADIOLOGY & ADDITIONAL STUDIES:  FINDINGS:  LOWER CHEST: TAVR and probably pacemaker partially imaged. Extensive   mitral valve annular calcifications. Coronary artery calcifications.   Small left pleural effusion. Trace right pleural effusion.    LIVER: Nodular contour. No focal lesions.  BILE DUCTS: Normal caliber.  GALLBLADDER: Sludge and probably some small calcified gallstone similar   to before.  SPLEEN: Within normal limits.  PANCREAS: Within normal limits.  ADRENALS: Within normal limits.  KIDNEYS/URETERS small benign cysts. No hydronephrosis.: Within normal   limits.    BLADDER: Moderately distended.  REPRODUCTIVE ORGANS: Unremarkable at CT.    BOWEL: No intraluminal extravasation of contrast that would indicate   acute/active GI bleeding. No definite diverticular disease. No bowel   obstruction. Appendix is not visualized. No evidence of inflammation in   the pericecal region. No acute inflammatory changes.  PERITONEUM/RETROPERITONEUM: Mild to moderate ascites. No peritoneal   nodularity.  VESSELS: Atherosclerotic changes. Recanalization of the umbilical vein   with probably some very small upper abdominal varices. No esophageal   ascites.  LYMPH NODES: No lymphadenopathy.  ABDOMINAL WALL: Moderate left inguinal hernia containing a portion of   nonobstructed, noninflamed sigmoid colon. This is contiguous with a large   amount of fluid in the left hemiscrotum, presumably ascites within a   larger component of the hernia rather than a large contiguous hydrocele.  BONES: Degenerative changes.    IMPRESSION:    1. No acute/active GI bleeding.    2. Findings of cirrhosis with portal hypertension including mild to   moderate ascites. Very small upper abdominal varices. No esophageal   varices demonstrated.    3. Small left pleural effusion with some adjacent atelectasis. Trace   right pleural effusion.

## 2025-04-04 NOTE — CONSULT NOTE ADULT - ASSESSMENT
79 year old male with significant history of liver cirrhosis with encephalopathy on rifaximin, AF not on anticoagulation, HTN presenting with near syncope, severe anemia, history gastric AVM's; no known history of esophageal varices.     Imp:   1. Symptomatic anemia in setting of BRENNAN and known history gastric AVM's without overt signs of GI bleeding  2. Chronic liver disease, cirrhosis ?JOSHI without ascites, encephalopathy, no known varices  MELD: 21    Rec:  ::PPI IV BID  ::Maintain NPO  ::Resuscitate with blood products with goal Hgb >7.0  ::Plan for EGD this afternoon

## 2025-04-04 NOTE — PHARMACOTHERAPY INTERVENTION NOTE - COMMENTS
Medication reconciliation completed.  Patient was unable to provide medication information, spoke to daughter Светлана ([phone]) and they provided current medication list; confirmed with Dr. [facility].

## 2025-04-04 NOTE — PROGRESS NOTE ADULT - ASSESSMENT
# suspected GI bleed  # cirrhosis  # thrombocytopenia   # hx of hepatic encephalopathy   - CT abdomen negative for bleed  - Protonix BID, carafate 1gm TID  5.1-->2 units -->6.2 --> 1 unit -->   bps improved to 120s - continue to hold all meds for now   resume rifaximin; hold lactulose - normal mentation  - GI consult - s/p EGD with banding of bleeding varices    # BRENNAN 2/2 above  prerenal    # mild to moderate ascites seen on CT  per pt had para outpt with 3.2 L removed earlier this week on monday  and is scheduled to have q3 week infusions   c/w ceftriaxone for SBP prophylaxis    SCDs   #ABLA 2/2 UGIB -  esophageal varices   # cirrhosis  # thrombocytopenia   # hx of hepatic encephalopathy   - CT abdomen negative for bleed  - Protonix BID, carafate 1gm TID  5.1-->2 units -->6.2 --> 1 unit --> h/h pending   bps improved to 120s - continue to hold all meds for now   resume rifaximin; hold lactulose - normal mentation  - GI consult - s/p EGD with banding - discussed with Dr. Hamilton - start octreotide drip; if hgb stable, can start CLD    # BERNNAN 2/2 above  prerenal    # mild to moderate ascites seen on CT  per pt had para outpt with 3.2 L removed earlier this week on monday  and is scheduled to have q3 week infusions   c/w ceftriaxone for SBP prophylaxis    SCDs

## 2025-04-04 NOTE — CONSULT NOTE ADULT - NS ATTEND OPT1 GEN_ALL_CORE
I attest my time as attending is greater than 50% of the total combined time spent on qualifying patient care activities by the PA/NP and attending.
16-Jul-2018

## 2025-04-04 NOTE — CONSULT NOTE ADULT - NS ATTEND AMEND GEN_ALL_CORE FT
Mr. 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 He was hemodynamically stable, AAOx3 without asterixis and without reports of overt bleeding with dark brown BM this AM. Plan for medical optimization. Keep NPO. Continue octreotide, ceftriaxone for SBP prophylaxis, PPI BID and plan for endoscopic evaluation today to rule out possibility of PUD vs bleeding varices. Further recommendations to follow post EGD.

## 2025-04-05 LAB
AMMONIA BLD-MCNC: 47 UMOL/L — HIGH (ref 11–32)
ANION GAP SERPL CALC-SCNC: 5 MMOL/L — SIGNIFICANT CHANGE UP (ref 5–17)
BUN SERPL-MCNC: 53 MG/DL — HIGH (ref 7–23)
CALCIUM SERPL-MCNC: 9.7 MG/DL — SIGNIFICANT CHANGE UP (ref 8.5–10.1)
CHLORIDE SERPL-SCNC: 111 MMOL/L — HIGH (ref 96–108)
CO2 SERPL-SCNC: 23 MMOL/L — SIGNIFICANT CHANGE UP (ref 22–31)
CREAT SERPL-MCNC: 1.58 MG/DL — HIGH (ref 0.5–1.3)
EGFR: 44 ML/MIN/1.73M2 — LOW
EGFR: 44 ML/MIN/1.73M2 — LOW
GLUCOSE SERPL-MCNC: 148 MG/DL — HIGH (ref 70–99)
HCT VFR BLD CALC: 21.5 % — LOW (ref 39–50)
HCT VFR BLD CALC: 22.2 % — LOW (ref 39–50)
HCT VFR BLD CALC: 22.8 % — LOW (ref 39–50)
HCT VFR BLD CALC: 23.2 % — LOW (ref 39–50)
HGB BLD-MCNC: 7.1 G/DL — LOW (ref 13–17)
HGB BLD-MCNC: 7.5 G/DL — LOW (ref 13–17)
HGB BLD-MCNC: 7.6 G/DL — LOW (ref 13–17)
HGB BLD-MCNC: 7.7 G/DL — LOW (ref 13–17)
IMMATURE PLATELET FRACTION #: 1.6 K/UL — LOW (ref 3.9–12.5)
IMMATURE PLATELET FRACTION #: 1.8 K/UL — LOW (ref 3.9–12.5)
IMMATURE PLATELET FRACTION #: 1.9 K/UL — LOW (ref 3.9–12.5)
IMMATURE PLATELET FRACTION #: 2.1 K/UL — LOW (ref 3.9–12.5)
IMMATURE PLATELET FRACTION %: 2.1 % — SIGNIFICANT CHANGE UP (ref 1.6–7.1)
IMMATURE PLATELET FRACTION %: 2.1 % — SIGNIFICANT CHANGE UP (ref 1.6–7.1)
IMMATURE PLATELET FRACTION %: 2.4 % — SIGNIFICANT CHANGE UP (ref 1.6–7.1)
IMMATURE PLATELET FRACTION %: 2.5 % — SIGNIFICANT CHANGE UP (ref 1.6–7.1)
MCHC RBC-ENTMCNC: 30.3 PG — SIGNIFICANT CHANGE UP (ref 27–34)
MCHC RBC-ENTMCNC: 30.4 PG — SIGNIFICANT CHANGE UP (ref 27–34)
MCHC RBC-ENTMCNC: 30.7 PG — SIGNIFICANT CHANGE UP (ref 27–34)
MCHC RBC-ENTMCNC: 31.1 PG — SIGNIFICANT CHANGE UP (ref 27–34)
MCHC RBC-ENTMCNC: 32.9 G/DL — SIGNIFICANT CHANGE UP (ref 32–36)
MCHC RBC-ENTMCNC: 33 G/DL — SIGNIFICANT CHANGE UP (ref 32–36)
MCHC RBC-ENTMCNC: 33.2 G/DL — SIGNIFICANT CHANGE UP (ref 32–36)
MCHC RBC-ENTMCNC: 34.2 G/DL — SIGNIFICANT CHANGE UP (ref 32–36)
MCV RBC AUTO: 91 FL — SIGNIFICANT CHANGE UP (ref 80–100)
MCV RBC AUTO: 91.3 FL — SIGNIFICANT CHANGE UP (ref 80–100)
MCV RBC AUTO: 92.3 FL — SIGNIFICANT CHANGE UP (ref 80–100)
MCV RBC AUTO: 93.1 FL — SIGNIFICANT CHANGE UP (ref 80–100)
NRBC # BLD AUTO: 0 K/UL — SIGNIFICANT CHANGE UP (ref 0–0)
NRBC # FLD: 0 K/UL — SIGNIFICANT CHANGE UP (ref 0–0)
NRBC BLD AUTO-RTO: 0 /100 WBCS — SIGNIFICANT CHANGE UP (ref 0–0)
PLATELET # BLD AUTO: 75 K/UL — LOW (ref 150–400)
PLATELET # BLD AUTO: 81 K/UL — LOW (ref 150–400)
PLATELET # BLD AUTO: 84 K/UL — LOW (ref 150–400)
PLATELET # BLD AUTO: 84 K/UL — LOW (ref 150–400)
PMV BLD: 10.2 FL — SIGNIFICANT CHANGE UP (ref 7–13)
PMV BLD: 10.7 FL — SIGNIFICANT CHANGE UP (ref 7–13)
PMV BLD: 10.8 FL — SIGNIFICANT CHANGE UP (ref 7–13)
PMV BLD: 10.8 FL — SIGNIFICANT CHANGE UP (ref 7–13)
POTASSIUM SERPL-MCNC: 4.3 MMOL/L — SIGNIFICANT CHANGE UP (ref 3.5–5.3)
POTASSIUM SERPL-SCNC: 4.3 MMOL/L — SIGNIFICANT CHANGE UP (ref 3.5–5.3)
RBC # BLD: 2.31 M/UL — LOW (ref 4.2–5.8)
RBC # BLD: 2.44 M/UL — LOW (ref 4.2–5.8)
RBC # BLD: 2.47 M/UL — LOW (ref 4.2–5.8)
RBC # BLD: 2.54 M/UL — LOW (ref 4.2–5.8)
RBC # FLD: 26.1 % — HIGH (ref 10.3–14.5)
RBC # FLD: 26.1 % — HIGH (ref 10.3–14.5)
RBC # FLD: 26.3 % — HIGH (ref 10.3–14.5)
RBC # FLD: 26.5 % — HIGH (ref 10.3–14.5)
SODIUM SERPL-SCNC: 139 MMOL/L — SIGNIFICANT CHANGE UP (ref 135–145)
WBC # BLD: 4.61 K/UL — SIGNIFICANT CHANGE UP (ref 3.8–10.5)
WBC # BLD: 4.61 K/UL — SIGNIFICANT CHANGE UP (ref 3.8–10.5)
WBC # BLD: 4.83 K/UL — SIGNIFICANT CHANGE UP (ref 3.8–10.5)
WBC # BLD: 5.22 K/UL — SIGNIFICANT CHANGE UP (ref 3.8–10.5)
WBC # FLD AUTO: 4.61 K/UL — SIGNIFICANT CHANGE UP (ref 3.8–10.5)
WBC # FLD AUTO: 4.61 K/UL — SIGNIFICANT CHANGE UP (ref 3.8–10.5)
WBC # FLD AUTO: 4.83 K/UL — SIGNIFICANT CHANGE UP (ref 3.8–10.5)
WBC # FLD AUTO: 5.22 K/UL — SIGNIFICANT CHANGE UP (ref 3.8–10.5)

## 2025-04-05 PROCEDURE — 99233 SBSQ HOSP IP/OBS HIGH 50: CPT

## 2025-04-05 PROCEDURE — 99232 SBSQ HOSP IP/OBS MODERATE 35: CPT

## 2025-04-05 RX ORDER — LACTULOSE 10 G/15ML
20 SOLUTION ORAL
Refills: 0 | Status: DISCONTINUED | OUTPATIENT
Start: 2025-04-05 | End: 2025-04-06

## 2025-04-05 RX ADMIN — Medication 1 TABLET(S): at 09:02

## 2025-04-05 RX ADMIN — Medication 1 GRAM(S): at 17:30

## 2025-04-05 RX ADMIN — MONTELUKAST SODIUM 10 MILLIGRAM(S): 10 TABLET ORAL at 09:02

## 2025-04-05 RX ADMIN — OCTREOTIDE ACETATE 10 MICROGRAM(S)/HR: 500 INJECTION, SOLUTION INTRAVENOUS; SUBCUTANEOUS at 11:05

## 2025-04-05 RX ADMIN — OCTREOTIDE ACETATE 10 MICROGRAM(S)/HR: 500 INJECTION, SOLUTION INTRAVENOUS; SUBCUTANEOUS at 05:27

## 2025-04-05 RX ADMIN — Medication 40 MILLIGRAM(S): at 05:27

## 2025-04-05 RX ADMIN — Medication 40 MILLIGRAM(S): at 17:31

## 2025-04-05 RX ADMIN — FOLIC ACID 1 MILLIGRAM(S): 1 TABLET ORAL at 09:02

## 2025-04-05 RX ADMIN — CEFTRIAXONE 1000 MILLIGRAM(S): 500 INJECTION, POWDER, FOR SOLUTION INTRAMUSCULAR; INTRAVENOUS at 21:37

## 2025-04-05 RX ADMIN — Medication 500 MILLIGRAM(S): at 09:02

## 2025-04-05 RX ADMIN — LACTULOSE 20 GRAM(S): 10 SOLUTION ORAL at 21:38

## 2025-04-05 RX ADMIN — Medication 1 GRAM(S): at 11:05

## 2025-04-05 RX ADMIN — FUROSEMIDE 20 MILLIGRAM(S): 10 INJECTION INTRAMUSCULAR; INTRAVENOUS at 09:02

## 2025-04-05 RX ADMIN — LACTULOSE 20 GRAM(S): 10 SOLUTION ORAL at 09:35

## 2025-04-05 RX ADMIN — Medication 10 MILLIGRAM(S): at 09:02

## 2025-04-05 RX ADMIN — OCTREOTIDE ACETATE 10 MICROGRAM(S)/HR: 500 INJECTION, SOLUTION INTRAVENOUS; SUBCUTANEOUS at 21:38

## 2025-04-05 NOTE — PROGRESS NOTE ADULT - ASSESSMENT
#ABLA 2/2 UGIB -  esophageal varices   #cirrhosis  #thrombocytopenia   #hx of hepatic encephalopathy   - CT abdomen negative for bleed  - Protonix BID, carafate 1gm TID  5.1-->2 units -->6.2 --> 1 unit --> 7.6 -- > 7.5   bps improved to 120s - continue to hold all meds for now   resume rifaximin; hold lactulose - normal mentation  - GI consult - s/p EGD with banding - started octreotide drip; if hgb stable  - started CLD today   - trending H/H    # BRENNAN 2/2 above  prerenal    # mild to moderate ascites seen on CT  per pt had para outpt with 3.2 L removed earlier this week on monday  and is scheduled to have q3 week infusions   c/w ceftriaxone for SBP prophylaxis    SCDs    medically active    DISPO: d/c planning once patient tolerating regular diet and H/H remains stable

## 2025-04-05 NOTE — PROGRESS NOTE ADULT - ASSESSMENT
80 YO M w/hx of decompensated cirrhosis, hx of HE and ascites, chronic anemia, HTN, afib, not on anticoagulation, hx of TAVR, hx of gastric AVM s/p APC Dec '23, presents with near-syncopal episode secondary to anemia (hgb 5 range).    s/p EGD 4/4/25 with blood in duodenum and esophageal varices without stigmata; no duodenal source identified.  Likely varcieal bleed, now s/p placement of 1 band.    Pt is clinically well this AM.  reports brown BM.  HDS  benign exam.  hgb in 7 range.    Recs:  - Continue IV ABX, IV ABX and IV PPI  - transfuse only if Hgb < 7 (maintain Hgb in 7 range)  - OK to advance to clear liquid diet today.  likely soft food tomorrow.

## 2025-04-06 LAB
ALBUMIN SERPL ELPH-MCNC: 3.2 G/DL — LOW (ref 3.3–5)
ALP SERPL-CCNC: 70 U/L — SIGNIFICANT CHANGE UP (ref 40–120)
ALT FLD-CCNC: 14 U/L — SIGNIFICANT CHANGE UP (ref 12–78)
AMMONIA BLD-MCNC: 45 UMOL/L — HIGH (ref 11–32)
ANION GAP SERPL CALC-SCNC: 3 MMOL/L — LOW (ref 5–17)
ANION GAP SERPL CALC-SCNC: 5 MMOL/L — SIGNIFICANT CHANGE UP (ref 5–17)
APPEARANCE UR: CLEAR — SIGNIFICANT CHANGE UP
AST SERPL-CCNC: 32 U/L — SIGNIFICANT CHANGE UP (ref 15–37)
BASE EXCESS BLDA CALC-SCNC: -2 MMOL/L — SIGNIFICANT CHANGE UP (ref -2–3)
BASOPHILS # BLD AUTO: 0.04 K/UL — SIGNIFICANT CHANGE UP (ref 0–0.2)
BASOPHILS NFR BLD AUTO: 0.7 % — SIGNIFICANT CHANGE UP (ref 0–2)
BILIRUB SERPL-MCNC: 2.5 MG/DL — HIGH (ref 0.2–1.2)
BILIRUB UR-MCNC: NEGATIVE — SIGNIFICANT CHANGE UP
BLOOD GAS COMMENTS ARTERIAL: SIGNIFICANT CHANGE UP
BUN SERPL-MCNC: 44 MG/DL — HIGH (ref 7–23)
BUN SERPL-MCNC: 46 MG/DL — HIGH (ref 7–23)
CALCIUM SERPL-MCNC: 9.3 MG/DL — SIGNIFICANT CHANGE UP (ref 8.5–10.1)
CALCIUM SERPL-MCNC: 9.4 MG/DL — SIGNIFICANT CHANGE UP (ref 8.5–10.1)
CHLORIDE SERPL-SCNC: 112 MMOL/L — HIGH (ref 96–108)
CHLORIDE SERPL-SCNC: 113 MMOL/L — HIGH (ref 96–108)
CO2 SERPL-SCNC: 22 MMOL/L — SIGNIFICANT CHANGE UP (ref 22–31)
CO2 SERPL-SCNC: 26 MMOL/L — SIGNIFICANT CHANGE UP (ref 22–31)
COLOR SPEC: YELLOW — SIGNIFICANT CHANGE UP
CREAT SERPL-MCNC: 1.55 MG/DL — HIGH (ref 0.5–1.3)
CREAT SERPL-MCNC: 1.68 MG/DL — HIGH (ref 0.5–1.3)
DIFF PNL FLD: NEGATIVE — SIGNIFICANT CHANGE UP
EGFR: 41 ML/MIN/1.73M2 — LOW
EGFR: 41 ML/MIN/1.73M2 — LOW
EGFR: 45 ML/MIN/1.73M2 — LOW
EGFR: 45 ML/MIN/1.73M2 — LOW
EOSINOPHIL # BLD AUTO: 0.24 K/UL — SIGNIFICANT CHANGE UP (ref 0–0.5)
EOSINOPHIL NFR BLD AUTO: 4.5 % — SIGNIFICANT CHANGE UP (ref 0–6)
FLUAV AG NPH QL: SIGNIFICANT CHANGE UP
FLUBV AG NPH QL: SIGNIFICANT CHANGE UP
GAS PNL BLDA: SIGNIFICANT CHANGE UP
GLUCOSE SERPL-MCNC: 143 MG/DL — HIGH (ref 70–99)
GLUCOSE SERPL-MCNC: 201 MG/DL — HIGH (ref 70–99)
GLUCOSE UR QL: NEGATIVE MG/DL — SIGNIFICANT CHANGE UP
HCO3 BLDA-SCNC: 23 MMOL/L — SIGNIFICANT CHANGE UP (ref 21–28)
HCT VFR BLD CALC: 21.6 % — LOW (ref 39–50)
HCT VFR BLD CALC: 25 % — LOW (ref 39–50)
HGB BLD-MCNC: 7.2 G/DL — LOW (ref 13–17)
HGB BLD-MCNC: 8.1 G/DL — LOW (ref 13–17)
IMM GRANULOCYTES # BLD AUTO: 0.02 K/UL — SIGNIFICANT CHANGE UP (ref 0–0.07)
IMM GRANULOCYTES NFR BLD AUTO: 0.4 % — SIGNIFICANT CHANGE UP (ref 0–0.9)
IMMATURE PLATELET FRACTION #: 1.7 K/UL — LOW (ref 3.9–12.5)
IMMATURE PLATELET FRACTION #: 1.7 K/UL — LOW (ref 3.9–12.5)
IMMATURE PLATELET FRACTION %: 2.3 % — SIGNIFICANT CHANGE UP (ref 1.6–7.1)
IMMATURE PLATELET FRACTION %: 2.6 % — SIGNIFICANT CHANGE UP (ref 1.6–7.1)
KETONES UR-MCNC: NEGATIVE MG/DL — SIGNIFICANT CHANGE UP
LACTATE SERPL-SCNC: 2 MMOL/L — SIGNIFICANT CHANGE UP (ref 0.7–2)
LEUKOCYTE ESTERASE UR-ACNC: NEGATIVE — SIGNIFICANT CHANGE UP
LYMPHOCYTES # BLD AUTO: 0.52 K/UL — LOW (ref 1–3.3)
LYMPHOCYTES NFR BLD AUTO: 9.7 % — LOW (ref 13–44)
MCHC RBC-ENTMCNC: 30.5 PG — SIGNIFICANT CHANGE UP (ref 27–34)
MCHC RBC-ENTMCNC: 31 PG — SIGNIFICANT CHANGE UP (ref 27–34)
MCHC RBC-ENTMCNC: 32.4 G/DL — SIGNIFICANT CHANGE UP (ref 32–36)
MCHC RBC-ENTMCNC: 33.3 G/DL — SIGNIFICANT CHANGE UP (ref 32–36)
MCV RBC AUTO: 93.1 FL — SIGNIFICANT CHANGE UP (ref 80–100)
MCV RBC AUTO: 94 FL — SIGNIFICANT CHANGE UP (ref 80–100)
MONOCYTES # BLD AUTO: 0.42 K/UL — SIGNIFICANT CHANGE UP (ref 0–0.9)
MONOCYTES NFR BLD AUTO: 7.9 % — SIGNIFICANT CHANGE UP (ref 2–14)
NEUTROPHILS # BLD AUTO: 4.11 K/UL — SIGNIFICANT CHANGE UP (ref 1.8–7.4)
NEUTROPHILS NFR BLD AUTO: 76.8 % — SIGNIFICANT CHANGE UP (ref 43–77)
NITRITE UR-MCNC: NEGATIVE — SIGNIFICANT CHANGE UP
NRBC # BLD AUTO: 0 K/UL — SIGNIFICANT CHANGE UP (ref 0–0)
NRBC # BLD AUTO: 0 K/UL — SIGNIFICANT CHANGE UP (ref 0–0)
NRBC # FLD: 0 K/UL — SIGNIFICANT CHANGE UP (ref 0–0)
NRBC # FLD: 0 K/UL — SIGNIFICANT CHANGE UP (ref 0–0)
NRBC BLD AUTO-RTO: 0 /100 WBCS — SIGNIFICANT CHANGE UP (ref 0–0)
NRBC BLD AUTO-RTO: 0 /100 WBCS — SIGNIFICANT CHANGE UP (ref 0–0)
PCO2 BLDA: 40 MMHG — SIGNIFICANT CHANGE UP (ref 35–48)
PH BLDA: 7.37 — SIGNIFICANT CHANGE UP (ref 7.35–7.45)
PH UR: 5 — SIGNIFICANT CHANGE UP (ref 5–8)
PLATELET # BLD AUTO: 65 K/UL — LOW (ref 150–400)
PLATELET # BLD AUTO: 72 K/UL — LOW (ref 150–400)
PMV BLD: 10.5 FL — SIGNIFICANT CHANGE UP (ref 7–13)
PMV BLD: 10.8 FL — SIGNIFICANT CHANGE UP (ref 7–13)
PO2 BLDA: 96 MMHG — SIGNIFICANT CHANGE UP (ref 83–108)
POTASSIUM SERPL-MCNC: 3.9 MMOL/L — SIGNIFICANT CHANGE UP (ref 3.5–5.3)
POTASSIUM SERPL-MCNC: 3.9 MMOL/L — SIGNIFICANT CHANGE UP (ref 3.5–5.3)
POTASSIUM SERPL-SCNC: 3.9 MMOL/L — SIGNIFICANT CHANGE UP (ref 3.5–5.3)
POTASSIUM SERPL-SCNC: 3.9 MMOL/L — SIGNIFICANT CHANGE UP (ref 3.5–5.3)
PROT SERPL-MCNC: 6.6 GM/DL — SIGNIFICANT CHANGE UP (ref 6–8.3)
PROT UR-MCNC: NEGATIVE MG/DL — SIGNIFICANT CHANGE UP
RBC # BLD: 2.32 M/UL — LOW (ref 4.2–5.8)
RBC # BLD: 2.66 M/UL — LOW (ref 4.2–5.8)
RBC # FLD: 24.2 % — HIGH (ref 10.3–14.5)
RBC # FLD: 25.9 % — HIGH (ref 10.3–14.5)
RSV RNA NPH QL NAA+NON-PROBE: SIGNIFICANT CHANGE UP
SAO2 % BLDA: 98 % — SIGNIFICANT CHANGE UP (ref 94–98)
SARS-COV-2 RNA SPEC QL NAA+PROBE: DETECTED
SODIUM SERPL-SCNC: 139 MMOL/L — SIGNIFICANT CHANGE UP (ref 135–145)
SODIUM SERPL-SCNC: 142 MMOL/L — SIGNIFICANT CHANGE UP (ref 135–145)
SOURCE RESPIRATORY: SIGNIFICANT CHANGE UP
SP GR SPEC: 1.01 — SIGNIFICANT CHANGE UP (ref 1–1.03)
UROBILINOGEN FLD QL: 0.2 MG/DL — SIGNIFICANT CHANGE UP (ref 0.2–1)
WBC # BLD: 5.16 K/UL — SIGNIFICANT CHANGE UP (ref 3.8–10.5)
WBC # BLD: 5.35 K/UL — SIGNIFICANT CHANGE UP (ref 3.8–10.5)
WBC # FLD AUTO: 5.16 K/UL — SIGNIFICANT CHANGE UP (ref 3.8–10.5)
WBC # FLD AUTO: 5.35 K/UL — SIGNIFICANT CHANGE UP (ref 3.8–10.5)

## 2025-04-06 PROCEDURE — 71045 X-RAY EXAM CHEST 1 VIEW: CPT | Mod: 26

## 2025-04-06 PROCEDURE — 99232 SBSQ HOSP IP/OBS MODERATE 35: CPT

## 2025-04-06 PROCEDURE — 99233 SBSQ HOSP IP/OBS HIGH 50: CPT

## 2025-04-06 PROCEDURE — 76705 ECHO EXAM OF ABDOMEN: CPT | Mod: 26

## 2025-04-06 PROCEDURE — 71250 CT THORAX DX C-: CPT | Mod: 26

## 2025-04-06 RX ORDER — DEXAMETHASONE 0.5 MG/1
6 TABLET ORAL DAILY
Refills: 0 | Status: DISCONTINUED | OUTPATIENT
Start: 2025-04-06 | End: 2025-04-09

## 2025-04-06 RX ORDER — REMDESIVIR 5 MG/ML
100 INJECTION INTRAVENOUS EVERY 24 HOURS
Refills: 0 | Status: DISCONTINUED | OUTPATIENT
Start: 2025-04-07 | End: 2025-04-09

## 2025-04-06 RX ORDER — FUROSEMIDE 10 MG/ML
20 INJECTION INTRAMUSCULAR; INTRAVENOUS ONCE
Refills: 0 | Status: COMPLETED | OUTPATIENT
Start: 2025-04-06 | End: 2025-04-06

## 2025-04-06 RX ORDER — REMDESIVIR 5 MG/ML
INJECTION INTRAVENOUS
Refills: 0 | Status: DISCONTINUED | OUTPATIENT
Start: 2025-04-06 | End: 2025-04-09

## 2025-04-06 RX ORDER — REMDESIVIR 5 MG/ML
200 INJECTION INTRAVENOUS EVERY 24 HOURS
Refills: 0 | Status: COMPLETED | OUTPATIENT
Start: 2025-04-06 | End: 2025-04-06

## 2025-04-06 RX ORDER — LACTULOSE 10 G/15ML
20 SOLUTION ORAL EVERY 8 HOURS
Refills: 0 | Status: DISCONTINUED | OUTPATIENT
Start: 2025-04-06 | End: 2025-04-09

## 2025-04-06 RX ADMIN — FOLIC ACID 1 MILLIGRAM(S): 1 TABLET ORAL at 09:59

## 2025-04-06 RX ADMIN — REMDESIVIR 200 MILLIGRAM(S): 5 INJECTION INTRAVENOUS at 18:17

## 2025-04-06 RX ADMIN — Medication 40 MILLIGRAM(S): at 06:52

## 2025-04-06 RX ADMIN — OCTREOTIDE ACETATE 10 MICROGRAM(S)/HR: 500 INJECTION, SOLUTION INTRAVENOUS; SUBCUTANEOUS at 07:38

## 2025-04-06 RX ADMIN — Medication 500 MILLIGRAM(S): at 09:59

## 2025-04-06 RX ADMIN — MONTELUKAST SODIUM 10 MILLIGRAM(S): 10 TABLET ORAL at 09:59

## 2025-04-06 RX ADMIN — OCTREOTIDE ACETATE 10 MICROGRAM(S)/HR: 500 INJECTION, SOLUTION INTRAVENOUS; SUBCUTANEOUS at 17:53

## 2025-04-06 RX ADMIN — LACTULOSE 20 GRAM(S): 10 SOLUTION ORAL at 09:59

## 2025-04-06 RX ADMIN — LACTULOSE 20 GRAM(S): 10 SOLUTION ORAL at 22:20

## 2025-04-06 RX ADMIN — Medication 40 MILLIGRAM(S): at 18:10

## 2025-04-06 RX ADMIN — Medication 1 GRAM(S): at 00:55

## 2025-04-06 RX ADMIN — Medication 1 TABLET(S): at 09:58

## 2025-04-06 RX ADMIN — Medication 1 GRAM(S): at 17:54

## 2025-04-06 RX ADMIN — FUROSEMIDE 20 MILLIGRAM(S): 10 INJECTION INTRAMUSCULAR; INTRAVENOUS at 14:12

## 2025-04-06 RX ADMIN — Medication 1 GRAM(S): at 05:15

## 2025-04-06 RX ADMIN — Medication 10 MILLIGRAM(S): at 09:58

## 2025-04-06 RX ADMIN — FUROSEMIDE 20 MILLIGRAM(S): 10 INJECTION INTRAMUSCULAR; INTRAVENOUS at 09:59

## 2025-04-06 RX ADMIN — CEFTRIAXONE 1000 MILLIGRAM(S): 500 INJECTION, POWDER, FOR SOLUTION INTRAMUSCULAR; INTRAVENOUS at 22:20

## 2025-04-06 RX ADMIN — Medication 1 GRAM(S): at 11:01

## 2025-04-06 RX ADMIN — LACTULOSE 20 GRAM(S): 10 SOLUTION ORAL at 13:40

## 2025-04-06 RX ADMIN — DEXAMETHASONE 6 MILLIGRAM(S): 0.5 TABLET ORAL at 17:56

## 2025-04-06 RX ADMIN — Medication 650 MILLIGRAM(S): at 16:07

## 2025-04-06 NOTE — PROGRESS NOTE ADULT - ASSESSMENT
#ABLA 2/2 UGIB -  esophageal varices   #Symptomatic anemia   #cirrhosis  #thrombocytopenia   #hx of hepatic encephalopathy   - CT abdomen negative for bleed  - Protonix BID, carafate 1gm TID  5.1-->2 units -->6.2 --> 7.6 -- > 7.5 --> 7.2  s/p 4u pRBC so far   transfuse 1u today   bps improved to 120s - continue to hold all meds for now   resume rifaximin; hold lactulose - normal mentation  - GI consult - s/p EGD with banding - started octreotide drip; if hgb stable  - diet advanced today   - trending H/H    #Acute metabolic encephalopathy   -likely due to hyperammonemia vs symptomatic anemia  -pt is drowsy, but easily awakened, oriented x 3, follows commands  -2BM today   -increased lactulose to TID  -tarnsfusing 1u pRBC    #Acute hypoxic respiratory failure   -suspect volume overload  -CXR with L effusion vs infiltrate   -pt is afebrile, no leukocytosis, no respiratory symptoms   -will check 4plex   -give additional  lasix s/p transfusion      # BRENNAN 2/2 above  prerenal    # mild to moderate ascites seen on CT  per pt had para outpt with 3.2 L removed earlier this week on monday  and is scheduled to have q3 week infusions   c/w ceftriaxone for SBP prophylaxis    SCDs    medically active         #ABLA 2/2 UGIB -  esophageal varices   #Symptomatic anemia   #cirrhosis  #thrombocytopenia   #hx of hepatic encephalopathy   - CT abdomen negative for bleed  - Protonix BID, carafate 1gm TID  5.1-->2 units -->6.2 --> 7.6 -- > 7.5 --> 7.2  s/p 4u pRBC so far   transfuse 1u today   bps improved to 120s - continue to hold all meds for now   resume rifaximin; hold lactulose - normal mentation  - GI consult - s/p EGD with banding - started octreotide drip; if hgb stable  - diet advanced today   - trending H/H    Suspected sepsis  -Patient seen earlier with daughter at bedside, awake, alert, watching TV, oriented x 3, feels tired, intermittently drowsy, T 100.4F. with tachycardia, sepsis work up initiated  -will hold off on IVF until lactate results, pt is on ceftriaxone  -f/u CBC, BMP, blood cx, UA, urine cs if indicated  -F/u Lactate   -CT chest ordered -- CXR this AM with L base effusion vs infiltrate, + hypoxia  -Flu/covid/RVP swab  -ABG ordered   -abd distended, will get new US and possible para tomorrow if needed    #Acute metabolic encephalopathy   -likely due to hyperammonemia vs symptomatic anemia  -pt is drowsy, but easily awakened, oriented x 3, follows commands  -2BM today   -increased lactulose to TID  -tarnsfusing 1u pRBC    #Acute hypoxic respiratory failure   -suspect volume overload  -CXR with L effusion vs infiltrate   -pt is afebrile, no leukocytosis, no respiratory symptoms   -will check 4plex   -give additional  lasix s/p transfusion      # BRENNAN 2/2 above  prerenal    # mild to moderate ascites seen on CT  per pt had para outpt with 3.2 L removed earlier this week on monday  and is scheduled to have q3 week infusions   c/w ceftriaxone for SBP prophylaxis    SCDs    medically active

## 2025-04-06 NOTE — PROGRESS NOTE ADULT - ASSESSMENT
78yo male with etoh liver disease, s/p egd with banding    will transfuse for ?h/h today  ?recurrent bleeding  serial h/h    if evidence of recurrent gi bleeding, would consider repeat egd    d/w dr oakes

## 2025-04-07 LAB
ALBUMIN SERPL ELPH-MCNC: 3.1 G/DL — LOW (ref 3.3–5)
ALP SERPL-CCNC: 77 U/L — SIGNIFICANT CHANGE UP (ref 40–120)
ALT FLD-CCNC: 14 U/L — SIGNIFICANT CHANGE UP (ref 12–78)
AMMONIA BLD-MCNC: 38 UMOL/L — HIGH (ref 11–32)
ANION GAP SERPL CALC-SCNC: 6 MMOL/L — SIGNIFICANT CHANGE UP (ref 5–17)
AST SERPL-CCNC: 25 U/L — SIGNIFICANT CHANGE UP (ref 15–37)
BASOPHILS # BLD AUTO: 0 K/UL — SIGNIFICANT CHANGE UP (ref 0–0.2)
BASOPHILS NFR BLD AUTO: 0 % — SIGNIFICANT CHANGE UP (ref 0–2)
BILIRUB DIRECT SERPL-MCNC: 0.7 MG/DL — HIGH (ref 0–0.3)
BILIRUB INDIRECT FLD-MCNC: 0.6 MG/DL — SIGNIFICANT CHANGE UP (ref 0.2–1)
BILIRUB SERPL-MCNC: 1.3 MG/DL — HIGH (ref 0.2–1.2)
BUN SERPL-MCNC: 47 MG/DL — HIGH (ref 7–23)
CALCIUM SERPL-MCNC: 9.7 MG/DL — SIGNIFICANT CHANGE UP (ref 8.5–10.1)
CHLORIDE SERPL-SCNC: 114 MMOL/L — HIGH (ref 96–108)
CO2 SERPL-SCNC: 25 MMOL/L — SIGNIFICANT CHANGE UP (ref 22–31)
CREAT SERPL-MCNC: 1.62 MG/DL — HIGH (ref 0.5–1.3)
CRP SERPL-MCNC: 41.1 MG/L — HIGH (ref 0–5)
D DIMER BLD IA.RAPID-MCNC: 953 NG/ML DDU — HIGH
EGFR: 43 ML/MIN/1.73M2 — LOW
EGFR: 43 ML/MIN/1.73M2 — LOW
EOSINOPHIL # BLD AUTO: 0 K/UL — SIGNIFICANT CHANGE UP (ref 0–0.5)
EOSINOPHIL NFR BLD AUTO: 0 % — SIGNIFICANT CHANGE UP (ref 0–6)
FERRITIN SERPL-MCNC: 660 NG/ML — HIGH (ref 30–400)
GLUCOSE SERPL-MCNC: 189 MG/DL — HIGH (ref 70–99)
HCT VFR BLD CALC: 24.9 % — LOW (ref 39–50)
HGB BLD-MCNC: 8.2 G/DL — LOW (ref 13–17)
IMM GRANULOCYTES # BLD AUTO: 0.02 K/UL — SIGNIFICANT CHANGE UP (ref 0–0.07)
IMM GRANULOCYTES NFR BLD AUTO: 0.6 % — SIGNIFICANT CHANGE UP (ref 0–0.9)
IMMATURE PLATELET FRACTION #: 1.4 K/UL — LOW (ref 3.9–12.5)
IMMATURE PLATELET FRACTION %: 2.6 % — SIGNIFICANT CHANGE UP (ref 1.6–7.1)
INR BLD: 1.39 RATIO — HIGH (ref 0.85–1.16)
LDH SERPL L TO P-CCNC: 287 U/L — HIGH (ref 84–241)
LYMPHOCYTES # BLD AUTO: 0.24 K/UL — LOW (ref 1–3.3)
LYMPHOCYTES NFR BLD AUTO: 7.5 % — LOW (ref 13–44)
MCHC RBC-ENTMCNC: 30.8 PG — SIGNIFICANT CHANGE UP (ref 27–34)
MCHC RBC-ENTMCNC: 32.9 G/DL — SIGNIFICANT CHANGE UP (ref 32–36)
MCV RBC AUTO: 93.6 FL — SIGNIFICANT CHANGE UP (ref 80–100)
MONOCYTES # BLD AUTO: 0.1 K/UL — SIGNIFICANT CHANGE UP (ref 0–0.9)
MONOCYTES NFR BLD AUTO: 3.1 % — SIGNIFICANT CHANGE UP (ref 2–14)
NEUTROPHILS # BLD AUTO: 2.83 K/UL — SIGNIFICANT CHANGE UP (ref 1.8–7.4)
NEUTROPHILS NFR BLD AUTO: 88.8 % — HIGH (ref 43–77)
NRBC # BLD AUTO: 0 K/UL — SIGNIFICANT CHANGE UP (ref 0–0)
NRBC # FLD: 0 K/UL — SIGNIFICANT CHANGE UP (ref 0–0)
NRBC BLD AUTO-RTO: 0 /100 WBCS — SIGNIFICANT CHANGE UP (ref 0–0)
PLATELET # BLD AUTO: 55 K/UL — LOW (ref 150–400)
PMV BLD: 11.3 FL — SIGNIFICANT CHANGE UP (ref 7–13)
POTASSIUM SERPL-MCNC: 4.5 MMOL/L — SIGNIFICANT CHANGE UP (ref 3.5–5.3)
POTASSIUM SERPL-SCNC: 4.5 MMOL/L — SIGNIFICANT CHANGE UP (ref 3.5–5.3)
PROT SERPL-MCNC: 6.4 GM/DL — SIGNIFICANT CHANGE UP (ref 6–8.3)
PROTHROM AB SERPL-ACNC: 16.4 SEC — HIGH (ref 9.9–13.4)
RBC # BLD: 2.66 M/UL — LOW (ref 4.2–5.8)
RBC # FLD: 23.8 % — HIGH (ref 10.3–14.5)
SODIUM SERPL-SCNC: 145 MMOL/L — SIGNIFICANT CHANGE UP (ref 135–145)
WBC # BLD: 3.19 K/UL — LOW (ref 3.8–10.5)
WBC # FLD AUTO: 3.19 K/UL — LOW (ref 3.8–10.5)

## 2025-04-07 PROCEDURE — 99233 SBSQ HOSP IP/OBS HIGH 50: CPT

## 2025-04-07 RX ORDER — SPIRONOLACTONE 25 MG
25 TABLET ORAL DAILY
Refills: 0 | Status: DISCONTINUED | OUTPATIENT
Start: 2025-04-07 | End: 2025-04-09

## 2025-04-07 RX ADMIN — Medication 1 GRAM(S): at 13:01

## 2025-04-07 RX ADMIN — Medication 1 GRAM(S): at 06:02

## 2025-04-07 RX ADMIN — Medication 1 TABLET(S): at 08:48

## 2025-04-07 RX ADMIN — LACTULOSE 20 GRAM(S): 10 SOLUTION ORAL at 21:48

## 2025-04-07 RX ADMIN — CEFTRIAXONE 1000 MILLIGRAM(S): 500 INJECTION, POWDER, FOR SOLUTION INTRAMUSCULAR; INTRAVENOUS at 22:48

## 2025-04-07 RX ADMIN — MONTELUKAST SODIUM 10 MILLIGRAM(S): 10 TABLET ORAL at 08:48

## 2025-04-07 RX ADMIN — OCTREOTIDE ACETATE 10 MICROGRAM(S)/HR: 500 INJECTION, SOLUTION INTRAVENOUS; SUBCUTANEOUS at 08:47

## 2025-04-07 RX ADMIN — Medication 40 MILLIGRAM(S): at 06:58

## 2025-04-07 RX ADMIN — Medication 500 MILLIGRAM(S): at 08:48

## 2025-04-07 RX ADMIN — REMDESIVIR 200 MILLIGRAM(S): 5 INJECTION INTRAVENOUS at 17:22

## 2025-04-07 RX ADMIN — Medication 1 APPLICATION(S): at 08:51

## 2025-04-07 RX ADMIN — LACTULOSE 20 GRAM(S): 10 SOLUTION ORAL at 13:01

## 2025-04-07 RX ADMIN — Medication 25 MILLIGRAM(S): at 17:39

## 2025-04-07 RX ADMIN — DEXAMETHASONE 6 MILLIGRAM(S): 0.5 TABLET ORAL at 08:47

## 2025-04-07 RX ADMIN — OCTREOTIDE ACETATE 10 MICROGRAM(S)/HR: 500 INJECTION, SOLUTION INTRAVENOUS; SUBCUTANEOUS at 22:52

## 2025-04-07 RX ADMIN — Medication 1 GRAM(S): at 01:41

## 2025-04-07 RX ADMIN — LACTULOSE 20 GRAM(S): 10 SOLUTION ORAL at 06:58

## 2025-04-07 RX ADMIN — FOLIC ACID 1 MILLIGRAM(S): 1 TABLET ORAL at 08:47

## 2025-04-07 RX ADMIN — Medication 40 MILLIGRAM(S): at 17:23

## 2025-04-07 RX ADMIN — Medication 1 GRAM(S): at 17:23

## 2025-04-07 RX ADMIN — OCTREOTIDE ACETATE 10 MICROGRAM(S)/HR: 500 INJECTION, SOLUTION INTRAVENOUS; SUBCUTANEOUS at 04:41

## 2025-04-07 RX ADMIN — Medication 10 MILLIGRAM(S): at 08:47

## 2025-04-07 NOTE — CONSULT NOTE ADULT - ASSESSMENT
Assessment:      Antimicrobials:  rifAXIMin 550 two times a day  cefTRIAXone Injectable. 1000 every 24 hours (4/4 --- )  remdesivir  IVPB 100 every 24 hours (4/6 --- )    Impression:   #Acute Hypoxic Respiratory Failure, COVID Pneumonitis, Febrile syndrome   #Acute blood lose anemia 2/2 to UGIB  #Cirrhosis, Hepatic Encephalopathy   #PCN Allergy, tolerating CTX    Recommendations:  - continue Remdesivir 100mg q24 (Day #2), complete 3-day course  - on IV dexAMETHasone    - continue CTX 1G q24 for SBP ppx (Day #4)  - monitor temperature curve  - trend WBC  - reason for abx use reviewed with patient  - side effects of antibiotic discussed, tolerating abx well so far  - Prior cultures reviewed. An epidemiologic assessment was performed. There is a significant risk for resistant microorganisms to spread to family members, and/or healthcare staff. Isolation precautions based on infection control policy. Will reconsider further isolation measures based on new culture results and other clinical data as appropriate Appropriate cultures collected and an appropriate broad spectrum antibiotic therapy will be considered  - follow up BCx x2  - rest per primary team    Clinical team may change from intravenous to oral antibiotics when the following criteria are met:   1. Patient is clinically improving/stable       a)	Improved signs and symptoms of infection from initial presentation       b)	Afebrile for 24 hours       c)	Leukocytosis trending towards normal range   2. Patient is tolerating oral intake   3. Initial/repeat blood cultures are negative OR do not need to wait for preliminary blood cultures to result    Cannot advise changing to oral antibiotic therapy until culture sensitivity is available.   Assessment:  79M with hepatic encephalopathy, HTN, recent diagnosis of afib not on AC, abdominal telangiectasias, cirrhosis presents 4/3 with near syncope and emesis, found to be afebrile, Hgb of 5.1, s/p 2u PRBC, CT without evidence of GI bleed, admitted for Acute blood lose anemia 2/2 UGIB  s/p EGD 4/4/25 with blood in duodenum and esophageal varices without stigmata; no duodenal source identified.  Likely varcieal bleed, now s/p placement of 1 band  Course complicated by new fever 100.4F on (4/6)  Reports dry cough for 2-3 days, denies any fever or chills, reports feeling outstanding   Denies any abdominal pain, n/v, diarrhea or dysuria   WBC 5 > 3  Cr 1.62  UA 4/6 negative  COVID positive 4/6  CT Chest with small L effusion    Antimicrobials:  rifAXIMin 550 two times a day  cefTRIAXone Injectable. 1000 every 24 hours (4/4 --- )  remdesivir  IVPB 100 every 24 hours (4/6 --- )    Impression:   #Acute Hypoxic Respiratory Failure, COVID Pneumonitis, Febrile syndrome   #Acute blood lose anemia 2/2 to UGIB  #Cirrhosis, Hepatic Encephalopathy   #PCN Allergy, tolerating CTX  - new low grade fever from COVID, ?nosocomial, otherwise no other localizing symptom     Recommendations:  - continue Remdesivir 100mg q24 (Day #2), complete 3-day course  - on IV dexAMETHasone    - continue CTX 1G q24 for SBP ppx (Day #4)  - monitor temperature curve  - trend WBC  - reason for abx use reviewed with patient  - side effects of antibiotic discussed, tolerating abx well so far  - Prior cultures reviewed. An epidemiologic assessment was performed. There is a significant risk for resistant microorganisms to spread to family members, and/or healthcare staff. Isolation precautions based on infection control policy. Will reconsider further isolation measures based on new culture results and other clinical data as appropriate Appropriate cultures collected and an appropriate broad spectrum antibiotic therapy will be considered  - follow up BCx x2  - rest per primary team    Clinical team may change from intravenous to oral antibiotics when the following criteria are met:   1. Patient is clinically improving/stable       a)	Improved signs and symptoms of infection from initial presentation       b)	Afebrile for 24 hours       c)	Leukocytosis trending towards normal range   2. Patient is tolerating oral intake   3. Initial/repeat blood cultures are negative OR do not need to wait for preliminary blood cultures to result    Cannot advise changing to oral antibiotic therapy until culture sensitivity is available.

## 2025-04-07 NOTE — CONSULT NOTE ADULT - SUBJECTIVE AND OBJECTIVE BOX
Patient is a 79y old  Male who presents with a chief complaint of anemia, liver disease (06 Apr 2025 13:40)    HPI:    HPI:  80 YO M with hepatic encephalopathy,, HTN, recent diagnosis of afib not on AC, abdominal telangiectasias, cirrhosis. Pt presenting to  with daughter at bedside, According to her pt started having lightnedness and near syncope yesterday. Pt also had 2 episodes of emesis that was non bloody non bilious. Pt denies abdominal pain, diarrhea, bloody stools or dark stools. In the ED did have hypotension that improved with fluids. hemoglobin was 5.1, plt 49. 2 u prbc and 1 u plt ordered CT abdomen negative for gi bleed. Pt given Protonix and octreotide. and admitted.  (03 Apr 2025 21:28)  Above HPI reviewed and reconciled with patient    79M with hepatic encephalopathy, HTN, recent diagnosis of afib not on AC, abdominal telangiectasias, cirrhosis presents 4/3 with near syncope and emesis, found to be afebrile, Hgb of 5.1, s/p 2u PRBC, CT without evidence of GI bleed, admitted for Acute blood lose anemia 2/2 UGIB  s/p EGD 4/4/25 with blood in duodenum and esophageal varices without stigmata; no duodenal source identified.  Likely varcieal bleed, now s/p placement of 1 band  Course complicated by new fever 100.4F on (4/6)  WBC 5 > 3  Cr 1.62  UA 4/6 negative  COVID positive 4/6  CT Chest with small L effusion    PAST MEDICAL & SURGICAL HISTORY:  HTN (hypertension)      Hepatic encephalopathy        FAMILY HISTORY:  FH: HTN (hypertension)      Social Hx: Denies alcohol, tobacco, recreational drug use    Allergies  penicillin (Rash)  Mushrooms (Anaphylaxis (Severe))    ANTIMICROBIALS (past 90 days)  MEDICATIONS  (STANDING):    cefTRIAXone Injectable.   1000 milliGRAM(s) IV Push (04-06-25 @ 22:20)   1000 milliGRAM(s) IV Push (04-05-25 @ 21:37)   1000 milliGRAM(s) IV Push (04-04-25 @ 21:34)    cefTRIAXone Injectable.   1000 milliGRAM(s) IV Push (04-04-25 @ 00:43)    remdesivir  IVPB   200 mL/Hr IV Intermittent (04-06-25 @ 18:17)    rifAXIMin   550 milliGRAM(s) Oral (04-07-25 @ 08:48)   550 milliGRAM(s) Oral (04-06-25 @ 22:20)   550 milliGRAM(s) Oral (04-06-25 @ 09:59)   550 milliGRAM(s) Oral (04-05-25 @ 21:38)   550 milliGRAM(s) Oral (04-05-25 @ 09:35)        ACTIVE ANTIMICROBIALS  rifAXIMin 550 two times a day  cefTRIAXone Injectable. 1000 every 24 hours (4/4 --- )  remdesivir  IVPB 100 every 24 hours (4/6 --- )    MEDICATIONS  (STANDING):  acetaminophen     Tablet .. 650 every 6 hours PRN  aluminum hydroxide/magnesium hydroxide/simethicone Suspension 30 every 4 hours PRN  cetirizine 10 daily  dexAMETHasone  Injectable 6 daily  lactulose Syrup 20 every 8 hours  melatonin 3 at bedtime PRN  montelukast 10 daily  octreotide  Infusion 50 <Continuous>  ondansetron Injectable 4 every 8 hours PRN  pantoprazole  Injectable 40 two times a day  sucralfate 1 four times a day      REVIEW OF SYSTEMS  [  ] ROS unobtainable because:    [ x ] All other systems negative except as noted below    Constitutional:  [ ] fever [ ] chills  [ ] weight loss  [ ]night sweat  [ ]poor appetite/PO intake [ ]fatigue   Skin:  [ ] rash [ ] phlebitis	  Eyes: [ ] icterus [ ] pain  [ ] discharge	  ENMT: [ ] sore throat  [ ] thrush [ ] ulcers [ ] exudates [ ]anosmia  Respiratory: [ ] dyspnea [ ] hemoptysis [ ] cough [ ] sputum	  Cardiovascular:  [ ] chest pain [ ] palpitations [ ] edema	  Gastrointestinal:  [ ] nausea [ ] vomiting [ ] diarrhea [ ] constipation [ ] pain	  Genitourinary:  [ ] dysuria [ ] frequency [ ] hematuria [ ] discharge [ ] flank pain  [ ] incontinence  Musculoskeletal:  [ ] myalgias [ ] arthralgias [ ] arthritis  [ ] back pain  Neurological:  [ ] headache [ ] weakness [ ] seizures  [ ] confusion/altered mental status    Vital Signs Last 24 Hrs  T(C): 36.6 (07 Apr 2025 06:02), Max: 38 (06 Apr 2025 16:05)  T(F): 97.9 (07 Apr 2025 06:02), Max: 100.4 (06 Apr 2025 16:05)  HR: 72 (07 Apr 2025 06:02) (64 - 108)  BP: 133/65 (07 Apr 2025 06:02) (118/66 - 140/75)  BP(mean): --  RR: 18 (07 Apr 2025 06:02) (16 - 18)  SpO2: 100% (07 Apr 2025 06:02) (93% - 100%)    Parameters below as of 07 Apr 2025 06:02  Patient On (Oxygen Delivery Method): nasal cannula  O2 Flow (L/min): 2      Physical Exam:  Constitutional:  well preserved, comfortable  Head/Eyes: no icterus  LUNGS:  CTA  CVS:  regular rhythm  Abd:  soft, non-tender; non-distended  Ext:  no edema  Vascular:  IV site no erythema tenderness or discharge  Neuro: AAO X 3, non- focal    Labs: all available labs reviewed                        8.2    3.19  )-----------( 55       ( 07 Apr 2025 07:00 )             24.9     04-07    145  |  114[H]  |  47[H]  ----------------------------<  189[H]  4.5   |  25  |  1.62[H]    Ca    9.7      07 Apr 2025 07:00    TPro  6.4  /  Alb  3.1[L]  /  TBili  1.3[H]  /  DBili  0.7[H]  /  AST  25  /  ALT  14  /  AlkPhos  77  04-07     LIVER FUNCTIONS - ( 07 Apr 2025 07:00 )  Alb: 3.1 g/dL / Pro: 6.4 gm/dL / ALK PHOS: 77 U/L / ALT: 14 U/L / AST: 25 U/L / GGT: x           Urinalysis Basic - ( 07 Apr 2025 07:00 )    Color: x / Appearance: x / SG: x / pH: x  Gluc: 189 mg/dL / Ketone: x  / Bili: x / Urobili: x   Blood: x / Protein: x / Nitrite: x   Leuk Esterase: x / RBC: x / WBC x   Sq Epi: x / Non Sq Epi: x / Bacteria: x          Radiology: all available radiological tests reviewed    Advanced directives addressed: full resuscitation Patient is a 79y old  Male who presents with a chief complaint of anemia, liver disease (06 Apr 2025 13:40)    HPI:  78 YO M with hepatic encephalopathy,, HTN, recent diagnosis of afib not on AC, abdominal telangiectasias, cirrhosis. Pt presenting to  with daughter at bedside, According to her pt started having lightnedness and near syncope yesterday. Pt also had 2 episodes of emesis that was non bloody non bilious. Pt denies abdominal pain, diarrhea, bloody stools or dark stools. In the ED did have hypotension that improved with fluids. hemoglobin was 5.1, plt 49. 2 u prbc and 1 u plt ordered CT abdomen negative for gi bleed. Pt given Protonix and octreotide. and admitted.  (03 Apr 2025 21:28)  Above HPI reviewed and reconciled with patient    79M with hepatic encephalopathy, HTN, recent diagnosis of afib not on AC, abdominal telangiectasias, cirrhosis presents 4/3 with near syncope and emesis, found to be afebrile, Hgb of 5.1, s/p 2u PRBC, CT without evidence of GI bleed, admitted for Acute blood lose anemia 2/2 UGIB  s/p EGD 4/4/25 with blood in duodenum and esophageal varices without stigmata; no duodenal source identified.  Likely varcieal bleed, now s/p placement of 1 band  Course complicated by new fever 100.4F on (4/6)  Reports dry cough for 2-3 days, denies any fever or chills, reports feeling outstanding   Denies any abdominal pain, n/v, diarrhea or dysuria   WBC 5 > 3  Cr 1.62  UA 4/6 negative  COVID positive 4/6  CT Chest with small L effusion    PAST MEDICAL & SURGICAL HISTORY:  HTN (hypertension)      Hepatic encephalopathy        FAMILY HISTORY:  FH: HTN (hypertension)      Social Hx: Denies alcohol, tobacco, recreational drug use    Allergies  penicillin (Rash)  Mushrooms (Anaphylaxis (Severe))    ANTIMICROBIALS (past 90 days)  MEDICATIONS  (STANDING):    cefTRIAXone Injectable.   1000 milliGRAM(s) IV Push (04-06-25 @ 22:20)   1000 milliGRAM(s) IV Push (04-05-25 @ 21:37)   1000 milliGRAM(s) IV Push (04-04-25 @ 21:34)    cefTRIAXone Injectable.   1000 milliGRAM(s) IV Push (04-04-25 @ 00:43)    remdesivir  IVPB   200 mL/Hr IV Intermittent (04-06-25 @ 18:17)    rifAXIMin   550 milliGRAM(s) Oral (04-07-25 @ 08:48)   550 milliGRAM(s) Oral (04-06-25 @ 22:20)   550 milliGRAM(s) Oral (04-06-25 @ 09:59)   550 milliGRAM(s) Oral (04-05-25 @ 21:38)   550 milliGRAM(s) Oral (04-05-25 @ 09:35)        ACTIVE ANTIMICROBIALS  rifAXIMin 550 two times a day  cefTRIAXone Injectable. 1000 every 24 hours (4/4 --- )  remdesivir  IVPB 100 every 24 hours (4/6 --- )    MEDICATIONS  (STANDING):  acetaminophen     Tablet .. 650 every 6 hours PRN  aluminum hydroxide/magnesium hydroxide/simethicone Suspension 30 every 4 hours PRN  cetirizine 10 daily  dexAMETHasone  Injectable 6 daily  lactulose Syrup 20 every 8 hours  melatonin 3 at bedtime PRN  montelukast 10 daily  octreotide  Infusion 50 <Continuous>  ondansetron Injectable 4 every 8 hours PRN  pantoprazole  Injectable 40 two times a day  sucralfate 1 four times a day      REVIEW OF SYSTEMS  [  ] ROS unobtainable because:    [ x ] All other systems negative except as noted below    Constitutional:  [ ] fever [ ] chills  [ ] weight loss  [ ]night sweat  [ ]poor appetite/PO intake [ ]fatigue   Skin:  [ ] rash [ ] phlebitis	  Eyes: [ ] icterus [ ] pain  [ ] discharge	  ENMT: [ ] sore throat  [ ] thrush [ ] ulcers [ ] exudates [ ]anosmia  Respiratory: [ ] dyspnea [ ] hemoptysis [ ] cough [ ] sputum	  Cardiovascular:  [ ] chest pain [ ] palpitations [ ] edema	  Gastrointestinal:  [ ] nausea [ ] vomiting [ ] diarrhea [ ] constipation [ ] pain	  Genitourinary:  [ ] dysuria [ ] frequency [ ] hematuria [ ] discharge [ ] flank pain  [ ] incontinence  Musculoskeletal:  [ ] myalgias [ ] arthralgias [ ] arthritis  [ ] back pain  Neurological:  [ ] headache [ ] weakness [ ] seizures  [ ] confusion/altered mental status    Vital Signs Last 24 Hrs  T(C): 36.6 (07 Apr 2025 06:02), Max: 38 (06 Apr 2025 16:05)  T(F): 97.9 (07 Apr 2025 06:02), Max: 100.4 (06 Apr 2025 16:05)  HR: 72 (07 Apr 2025 06:02) (64 - 108)  BP: 133/65 (07 Apr 2025 06:02) (118/66 - 140/75)  BP(mean): --  RR: 18 (07 Apr 2025 06:02) (16 - 18)  SpO2: 100% (07 Apr 2025 06:02) (93% - 100%)    Parameters below as of 07 Apr 2025 06:02  Patient On (Oxygen Delivery Method): nasal cannula  O2 Flow (L/min): 2      Physical Exam:  Constitutional:  frail, NAD  Head/Eyes: no icterus  LUNGS:  CTA  CVS:  regular rhythm  Abd:  soft, non-tender; +distended  Ext:  no edema  Vascular:  IV site no erythema tenderness or discharge  Neuro: AAO X 3, non- focal    Labs: all available labs reviewed                        8.2    3.19  )-----------( 55       ( 07 Apr 2025 07:00 )             24.9     04-07    145  |  114[H]  |  47[H]  ----------------------------<  189[H]  4.5   |  25  |  1.62[H]    Ca    9.7      07 Apr 2025 07:00    TPro  6.4  /  Alb  3.1[L]  /  TBili  1.3[H]  /  DBili  0.7[H]  /  AST  25  /  ALT  14  /  AlkPhos  77  04-07     LIVER FUNCTIONS - ( 07 Apr 2025 07:00 )  Alb: 3.1 g/dL / Pro: 6.4 gm/dL / ALK PHOS: 77 U/L / ALT: 14 U/L / AST: 25 U/L / GGT: x           Urinalysis Basic - ( 07 Apr 2025 07:00 )    Color: x / Appearance: x / SG: x / pH: x  Gluc: 189 mg/dL / Ketone: x  / Bili: x / Urobili: x   Blood: x / Protein: x / Nitrite: x   Leuk Esterase: x / RBC: x / WBC x   Sq Epi: x / Non Sq Epi: x / Bacteria: x          Radiology: all available radiological tests reviewed    Advanced directives addressed: full resuscitation

## 2025-04-07 NOTE — PROGRESS NOTE ADULT - ASSESSMENT
#ABLA 2/2 UGIB -  esophageal varices   #Symptomatic anemia   #cirrhosis  #thrombocytopenia   #hx of hepatic encephalopathy   - CT abdomen negative for bleed  - Protonix BID, carafate 1gm TID  hgb stable at 8.2 s/p 5 units prbcs during admission  bps stable at 100s-130s  c/w rifaximin and lactulose - titrate to 3-4 bms per day  - GI consult - s/p EGD with banding - c/w octreotide drip through 4/8 ( 5 days total)  tolerating regular diet     #Suspected sepsis  -Patient seen earlier with daughter at bedside, awake, alert, watching TV, oriented x 3, feels tired, intermittently drowsy, T 100.4F. with tachycardia, sepsis work up initiated  lactate 2.0   hold IVF given moderate ascites - pt pending tap  CT Chest Small right and small-to-moderate left pleural effusions - unable to uptitrate diuresis with renal function - pt currently on room air; will resume spironolactone 25mg po qd  f/u blood culture from 4/6  no indication for additional abx given no consolidation on ct chest -c/w remdesivir - today day 2/3    #Acute metabolic encephalopathy   mentation at baseline   c/w rifaximin and lactulose     #cirrhosis with ascites   US with mod ascites   ir consult for tap tomorrow   trend meld labs    #Acute hypoxic respiratory failure   -suspect volume overload  -CXR with L effusion vs infiltrate   -pt is afebrile, no leukocytosis, no respiratory symptoms   -will check 4plex   -give additional  lasix s/p transfusion      # 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

## 2025-04-07 NOTE — CHART NOTE - NSCHARTNOTEFT_GEN_A_CORE
Patient with COVID+  s/p EGD 4/4 with large varix s/p banding on octreotide x 5 days total last dose 4/8  Imaging consistent with bilateral pleural effusions and significant abdominal ascites.  Would suggest paracentesis as kidneys would not tolerate diuretics.  MELD: 16  HH stable; Hgb 8.2 from 8.1 s/p transfusion; not ideal response but given chronic liver disease, renal insufficiency may take time to bump back up  No overt signs of GIB, stools brown  Continue with surveillance.  Trend MELD labs and HH. Patient with COVID+  s/p EGD 4/4 with large varix s/p banding on octreotide x 5 days total last dose 4/8  Continue abx to prevent SBP  Imaging consistent with bilateral pleural effusions and significant abdominal ascites.  Would suggest paracentesis as kidneys would not tolerate diuretics.  MELD: 16  HH stable; Hgb 8.2 from 8.1 s/p transfusion; not ideal response but given chronic liver disease, renal insufficiency may take time to bump back up  No overt signs of GIB, stools brown  Continue with surveillance.  Trend MELD labs and HH.

## 2025-04-08 LAB
ALBUMIN SERPL ELPH-MCNC: 3 G/DL — LOW (ref 3.3–5)
ALP SERPL-CCNC: 71 U/L — SIGNIFICANT CHANGE UP (ref 40–120)
ALT FLD-CCNC: 15 U/L — SIGNIFICANT CHANGE UP (ref 12–78)
ANION GAP SERPL CALC-SCNC: 6 MMOL/L — SIGNIFICANT CHANGE UP (ref 5–17)
AST SERPL-CCNC: 36 U/L — SIGNIFICANT CHANGE UP (ref 15–37)
BILIRUB SERPL-MCNC: 1.1 MG/DL — SIGNIFICANT CHANGE UP (ref 0.2–1.2)
BUN SERPL-MCNC: 52 MG/DL — HIGH (ref 7–23)
CALCIUM SERPL-MCNC: 9.4 MG/DL — SIGNIFICANT CHANGE UP (ref 8.5–10.1)
CHLORIDE SERPL-SCNC: 114 MMOL/L — HIGH (ref 96–108)
CO2 SERPL-SCNC: 24 MMOL/L — SIGNIFICANT CHANGE UP (ref 22–31)
CREAT SERPL-MCNC: 1.53 MG/DL — HIGH (ref 0.5–1.3)
EGFR: 46 ML/MIN/1.73M2 — LOW
EGFR: 46 ML/MIN/1.73M2 — LOW
GLUCOSE SERPL-MCNC: 158 MG/DL — HIGH (ref 70–99)
HCT VFR BLD CALC: 21.4 % — LOW (ref 39–50)
HCT VFR BLD CALC: 25.7 % — LOW (ref 39–50)
HGB BLD-MCNC: 7.1 G/DL — LOW (ref 13–17)
HGB BLD-MCNC: 8.5 G/DL — LOW (ref 13–17)
IMMATURE PLATELET FRACTION #: 1.5 K/UL — LOW (ref 3.9–12.5)
IMMATURE PLATELET FRACTION #: 1.8 K/UL — LOW (ref 3.9–12.5)
IMMATURE PLATELET FRACTION %: 3.7 % — SIGNIFICANT CHANGE UP (ref 1.6–7.1)
IMMATURE PLATELET FRACTION %: 4.3 % — SIGNIFICANT CHANGE UP (ref 1.6–7.1)
INR BLD: 1.57 RATIO — HIGH (ref 0.85–1.16)
MCHC RBC-ENTMCNC: 30.3 PG — SIGNIFICANT CHANGE UP (ref 27–34)
MCHC RBC-ENTMCNC: 30.5 PG — SIGNIFICANT CHANGE UP (ref 27–34)
MCHC RBC-ENTMCNC: 33.1 G/DL — SIGNIFICANT CHANGE UP (ref 32–36)
MCHC RBC-ENTMCNC: 33.2 G/DL — SIGNIFICANT CHANGE UP (ref 32–36)
MCV RBC AUTO: 91.5 FL — SIGNIFICANT CHANGE UP (ref 80–100)
MCV RBC AUTO: 92.1 FL — SIGNIFICANT CHANGE UP (ref 80–100)
NRBC # BLD AUTO: 0 K/UL — SIGNIFICANT CHANGE UP (ref 0–0)
NRBC # BLD AUTO: 0 K/UL — SIGNIFICANT CHANGE UP (ref 0–0)
NRBC # FLD: 0 K/UL — SIGNIFICANT CHANGE UP (ref 0–0)
NRBC # FLD: 0 K/UL — SIGNIFICANT CHANGE UP (ref 0–0)
NRBC BLD AUTO-RTO: 0 /100 WBCS — SIGNIFICANT CHANGE UP (ref 0–0)
NRBC BLD AUTO-RTO: 0 /100 WBCS — SIGNIFICANT CHANGE UP (ref 0–0)
PLATELET # BLD AUTO: 41 K/UL — LOW (ref 150–400)
PLATELET # BLD AUTO: 42 K/UL — LOW (ref 150–400)
PMV BLD: 11.1 FL — SIGNIFICANT CHANGE UP (ref 7–13)
PMV BLD: 11.1 FL — SIGNIFICANT CHANGE UP (ref 7–13)
POTASSIUM SERPL-MCNC: 4.4 MMOL/L — SIGNIFICANT CHANGE UP (ref 3.5–5.3)
POTASSIUM SERPL-SCNC: 4.4 MMOL/L — SIGNIFICANT CHANGE UP (ref 3.5–5.3)
PROT SERPL-MCNC: 5.9 GM/DL — LOW (ref 6–8.3)
PROTHROM AB SERPL-ACNC: 18 SEC — HIGH (ref 9.9–13.4)
RBC # BLD: 2.34 M/UL — LOW (ref 4.2–5.8)
RBC # BLD: 2.79 M/UL — LOW (ref 4.2–5.8)
RBC # FLD: 21.4 % — HIGH (ref 10.3–14.5)
RBC # FLD: 23 % — HIGH (ref 10.3–14.5)
SODIUM SERPL-SCNC: 144 MMOL/L — SIGNIFICANT CHANGE UP (ref 135–145)
WBC # BLD: 4.7 K/UL — SIGNIFICANT CHANGE UP (ref 3.8–10.5)
WBC # BLD: 5.41 K/UL — SIGNIFICANT CHANGE UP (ref 3.8–10.5)
WBC # FLD AUTO: 4.7 K/UL — SIGNIFICANT CHANGE UP (ref 3.8–10.5)
WBC # FLD AUTO: 5.41 K/UL — SIGNIFICANT CHANGE UP (ref 3.8–10.5)

## 2025-04-08 PROCEDURE — 99233 SBSQ HOSP IP/OBS HIGH 50: CPT

## 2025-04-08 PROCEDURE — 49083 ABD PARACENTESIS W/IMAGING: CPT

## 2025-04-08 RX ORDER — FUROSEMIDE 10 MG/ML
20 INJECTION INTRAMUSCULAR; INTRAVENOUS ONCE
Refills: 0 | Status: COMPLETED | OUTPATIENT
Start: 2025-04-08 | End: 2025-04-08

## 2025-04-08 RX ORDER — ALBUMIN (HUMAN) 12.5 G/50ML
50 INJECTION, SOLUTION INTRAVENOUS ONCE
Refills: 0 | Status: COMPLETED | OUTPATIENT
Start: 2025-04-08 | End: 2025-04-08

## 2025-04-08 RX ADMIN — Medication 1 TABLET(S): at 09:58

## 2025-04-08 RX ADMIN — MONTELUKAST SODIUM 10 MILLIGRAM(S): 10 TABLET ORAL at 09:58

## 2025-04-08 RX ADMIN — Medication 25 MILLIGRAM(S): at 09:58

## 2025-04-08 RX ADMIN — FUROSEMIDE 20 MILLIGRAM(S): 10 INJECTION INTRAMUSCULAR; INTRAVENOUS at 14:43

## 2025-04-08 RX ADMIN — REMDESIVIR 200 MILLIGRAM(S): 5 INJECTION INTRAVENOUS at 18:27

## 2025-04-08 RX ADMIN — FOLIC ACID 1 MILLIGRAM(S): 1 TABLET ORAL at 09:58

## 2025-04-08 RX ADMIN — CEFTRIAXONE 1000 MILLIGRAM(S): 500 INJECTION, POWDER, FOR SOLUTION INTRAMUSCULAR; INTRAVENOUS at 22:30

## 2025-04-08 RX ADMIN — LACTULOSE 20 GRAM(S): 10 SOLUTION ORAL at 05:39

## 2025-04-08 RX ADMIN — Medication 1 GRAM(S): at 17:50

## 2025-04-08 RX ADMIN — LACTULOSE 20 GRAM(S): 10 SOLUTION ORAL at 22:30

## 2025-04-08 RX ADMIN — Medication 1 GRAM(S): at 05:55

## 2025-04-08 RX ADMIN — Medication 40 MILLIGRAM(S): at 05:40

## 2025-04-08 RX ADMIN — LACTULOSE 20 GRAM(S): 10 SOLUTION ORAL at 14:43

## 2025-04-08 RX ADMIN — OCTREOTIDE ACETATE 10 MICROGRAM(S)/HR: 500 INJECTION, SOLUTION INTRAVENOUS; SUBCUTANEOUS at 22:30

## 2025-04-08 RX ADMIN — Medication 3 MILLIGRAM(S): at 22:31

## 2025-04-08 RX ADMIN — OCTREOTIDE ACETATE 10 MICROGRAM(S)/HR: 500 INJECTION, SOLUTION INTRAVENOUS; SUBCUTANEOUS at 11:23

## 2025-04-08 RX ADMIN — Medication 1 GRAM(S): at 11:24

## 2025-04-08 RX ADMIN — DEXAMETHASONE 6 MILLIGRAM(S): 0.5 TABLET ORAL at 09:58

## 2025-04-08 RX ADMIN — Medication 10 MILLIGRAM(S): at 09:58

## 2025-04-08 RX ADMIN — Medication 500 MILLIGRAM(S): at 09:59

## 2025-04-08 RX ADMIN — ALBUMIN (HUMAN) 50 MILLILITER(S): 12.5 INJECTION, SOLUTION INTRAVENOUS at 13:59

## 2025-04-08 RX ADMIN — Medication 40 MILLIGRAM(S): at 17:50

## 2025-04-08 RX ADMIN — Medication 1 GRAM(S): at 00:47

## 2025-04-08 NOTE — PHYSICAL THERAPY INITIAL EVALUATION ADULT - LIVES WITH, PROFILE
Pt lives w/ his dgt in a home w/ 3 [address] and no other steps to negotiate in the home, pt states dgt will not allow him to use the indoor steps.

## 2025-04-08 NOTE — PHYSICAL THERAPY INITIAL EVALUATION ADULT - GENERAL OBSERVATIONS, REHAB EVAL
Pt seen on 5S, NAD, sitting OOB in chair, NAD, no SOB noted except after session, pt on RA O2 87% after session, DBE's encouraged and pt recovered on RA in sitting O2 97%.

## 2025-04-08 NOTE — PHYSICAL THERAPY INITIAL EVALUATION ADULT - GAIT TRAINING, PT EVAL
GOAL: Pt will ambulate 200' independently with/without AD as needed by discharge, paced breathing to maintain O2 levels

## 2025-04-08 NOTE — PHYSICAL THERAPY INITIAL EVALUATION ADULT - PHYSICAL ASSIST/NONPHYSICAL ASSIST: SIT/STAND, REHAB EVAL
[de-identified] : JOELLEN CALDERÓN a 24 year  old female here for evaluation of her left ankle . Patient states on02/09/2025 she fell down the stairs twisting her ankle. patient was seen in [facility]. x-ray taken showing displaced fracture.no numbness or tingling. ambulating with crutches  
[de-identified] : JOELLEN CALDERÓN a 24 year  old female here for evaluation of her left ankle . Patient states on02/09/2025 she fell down the stairs twisting her ankle. patient was seen in [facility]. x-ray taken showing displaced fracture.no numbness or tingling. ambulating with crutches  
supervision

## 2025-04-08 NOTE — PHYSICAL THERAPY INITIAL EVALUATION ADULT - STANDING BALANCE: DYNAMIC, REHAB EVAL
pt able to stand w/ open stance and rotate c/s w/o LOB noted, pt able to  object from floor and stand w/o LOB noted./good balance

## 2025-04-08 NOTE — PROGRESS NOTE ADULT - SUBJECTIVE AND OBJECTIVE BOX
Patient is a 79y old  Male who presents with a chief complaint of GIB (04 Apr 2025 16:57)    Pt is s/p EGD on 4/4/25 with endoscopic band ligation of esophageal varix performed.  Pt without complaint this AM.  Thirsty and wants to drink.  Denies nausea, vomitng, abd pain.  + brown BM; no melena or BRBPR.      MEDICATIONS  (STANDING):  ascorbic acid 500 milliGRAM(s) Oral daily  cefTRIAXone Injectable. 1000 milliGRAM(s) IV Push every 24 hours  cetirizine 10 milliGRAM(s) Oral daily  folic acid 1 milliGRAM(s) Oral daily  furosemide   Injectable 20 milliGRAM(s) IV Push daily  montelukast 10 milliGRAM(s) Oral daily  multivitamin 1 Tablet(s) Oral daily  octreotide  Infusion 50 MICROgram(s)/Hr (10 mL/Hr) IV Continuous <Continuous>  pantoprazole  Injectable 40 milliGRAM(s) IV Push two times a day  sucralfate 1 Gram(s) Oral four times a day    MEDICATIONS  (PRN):  acetaminophen     Tablet .. 650 milliGRAM(s) Oral every 6 hours PRN Temp greater or equal to 38C (100.4F), Mild Pain (1 - 3)  aluminum hydroxide/magnesium hydroxide/simethicone Suspension 30 milliLiter(s) Oral every 4 hours PRN Dyspepsia  melatonin 3 milliGRAM(s) Oral at bedtime PRN Insomnia  ondansetron Injectable 4 milliGRAM(s) IV Push every 8 hours PRN Nausea and/or Vomiting      Vital Signs Last 24 Hrs  T(C): 36.8 (05 Apr 2025 07:45), Max: 36.8 (05 Apr 2025 07:45)  T(F): 98.2 (05 Apr 2025 07:45), Max: 98.2 (05 Apr 2025 07:45)  HR: 73 (05 Apr 2025 07:45) (68 - 99)  BP: 126/64 (05 Apr 2025 07:45) (112/53 - 135/74)  BP(mean): --  RR: 18 (05 Apr 2025 07:45) (16 - 19)  SpO2: 95% (05 Apr 2025 07:45) (95% - 100%)    Parameters below as of 05 Apr 2025 07:45  Patient On (Oxygen Delivery Method): room air        PHYSICAL EXAM:    Constitutional: No acute distress, well-developed, non-toxic appearing  HEENT: good phonation, not icteric  Gastrointestinal: soft, non-tender, non-distended, no rebound or guarding  Extremities: No peripheral edema, no cyanosis or clubbing  Neurological: A/O x 3, no focal deficits, no asterixis  Psychiatric: Normal mood, normal affect  Skin: No rashes, not jaundiced    LABS:                        7.6    4.61  )-----------( 81       ( 05 Apr 2025 06:09 )             22.2     04-05    139  |  111[H]  |  53[H]  ----------------------------<  148[H]  4.3   |  23  |  1.58[H]    Ca    9.7      05 Apr 2025 06:09  Mg     1.8     04-03    TPro  5.9[L]  /  Alb  3.0[L]  /  TBili  3.2[H]  /  DBili  x   /  AST  25  /  ALT  13  /  AlkPhos  64  04-04    PT/INR - ( 03 Apr 2025 17:37 )   PT: 18.2 sec;   INR: 1.55 ratio         PTT - ( 03 Apr 2025 17:37 )  PTT:37.9 sec  LIVER FUNCTIONS - ( 04 Apr 2025 06:50 )  Alb: 3.0 g/dL / Pro: 5.9 gm/dL / ALK PHOS: 64 U/L / ALT: 13 U/L / AST: 25 U/L / GGT: x             RADIOLOGY & ADDITIONAL STUDIES:
HPI: 78 YO M with hepatic encephalopathy,, HTN, recent diagnosis of afib not on AC, abdominal telangiectasias, cirrhosis. Pt presenting to  with daughter at bedside, According to her pt started having lightnedness and near syncope yesterday. Pt also had 2 episodes of emesis that was non bloody non bilious. Pt denies abdominal pain, diarrhea, bloody stools or dark stools. In the ED did have hypotension that improved with fluids. hemoglobin was 5.1, plt 49. 2 u prbc and 1 u plt ordered CT abdomen negative for gi bleed. Pt given Protonix and octreotide. and admitted.      4/5: pt seen this AM, sitting up in chair at bedside, tolerating CLD, no overnight events, Hb 7.6g/dl this AM  4/6: pt seen this AM, tired appearing, hypoxic overnight, hb 7.2 this AM, ammonia 45, + BM x 2     REVIEW OF SYSTEMS:    CONSTITUTIONAL: No weakness, fevers or chills  EYES/ENT: No visual changes;  No vertigo or throat pain   NECK: No pain or stiffness  RESPIRATORY: No cough, wheezing, hemoptysis; No shortness of breath  CARDIOVASCULAR: No chest pain or palpitations  GASTROINTESTINAL: No abdominal or epigastric pain. No nausea, vomiting, or hematemesis; No diarrhea or constipation. No melena or hematochezia.  GENITOURINARY: No dysuria, frequency or hematuria  NEUROLOGICAL: No numbness or weakness  SKIN: No itching, rashes    Vital Signs Last 24 Hrs  T(C): 37 (06 Apr 2025 14:10), Max: 37 (06 Apr 2025 14:10)  T(F): 98.6 (06 Apr 2025 14:10), Max: 98.6 (06 Apr 2025 14:10)  HR: 100 (06 Apr 2025 14:10) (64 - 100)  BP: 127/73 (06 Apr 2025 14:10) (114/66 - 140/75  RR: 16 (06 Apr 2025 14:10) (16 - 18)  SpO2: 98% (06 Apr 2025 14:10) (87% - 99%)  Parameters below as of 06 Apr 2025 14:10  Patient On (Oxygen Delivery Method): nasal cannula  O2 Flow (L/min): 2        PHYSICAL EXAM:  GENERAL: NAD, lying in bed comfortably  HEAD:  Atraumatic, Normocephalic  EYES: conjunctiva and sclera clear  ENT: Moist mucous membranes  NECK: Supple, No JVD  CHEST/LUNG: Clear to auscultation bilaterally; No rales, rhonchi, wheezing. Unlabored respirations  HEART: Regular rate and rhythm; No murmurs  ABDOMEN: Bowel sounds present; Soft, Nontender, Nondistended.   EXTREMITIES:  2+ Peripheral Pulses, brisk capillary refill. No clubbing, cyanosis, or edema  NERVOUS SYSTEM:  Alert & Oriented X3, speech clear. No deficits   MSK: FROM all 4 extremities, full and equal strength      MEDICATIONS  (STANDING):  ascorbic acid 500 milliGRAM(s) Oral daily  cefTRIAXone Injectable. 1000 milliGRAM(s) IV Push every 24 hours  cetirizine 10 milliGRAM(s) Oral daily  folic acid 1 milliGRAM(s) Oral daily  furosemide   Injectable 20 milliGRAM(s) IV Push daily  lactulose Syrup 20 Gram(s) Oral every 8 hours  montelukast 10 milliGRAM(s) Oral daily  multivitamin 1 Tablet(s) Oral daily  octreotide  Infusion 50 MICROgram(s)/Hr (10 mL/Hr) IV Continuous <Continuous>  pantoprazole  Injectable 40 milliGRAM(s) IV Push two times a day  rifAXIMin 550 milliGRAM(s) Oral two times a day  sucralfate 1 Gram(s) Oral four times a day    MEDICATIONS  (PRN):  acetaminophen     Tablet .. 650 milliGRAM(s) Oral every 6 hours PRN Temp greater or equal to 38C (100.4F), Mild Pain (1 - 3)  aluminum hydroxide/magnesium hydroxide/simethicone Suspension 30 milliLiter(s) Oral every 4 hours PRN Dyspepsia  melatonin 3 milliGRAM(s) Oral at bedtime PRN Insomnia  ondansetron Injectable 4 milliGRAM(s) IV Push every 8 hours PRN Nausea and/or Vomiting    LABS:                                     7.2    5.16  )-----------( 72       ( 06 Apr 2025 06:36 )             21.6   04-06    139  |  112[H]  |  46[H]  ----------------------------<  143[H]  3.9   |  22  |  1.55[H]    Ca    9.4      06 Apr 2025 06:36                  RADIOLOGY:    
HOSPITALIST ATTENDING PROGRESS NOTE    Chart and meds reviewed.  Patient seen and examined.    CC: gib     Subjective: hgb 1 point lower today; brown stools; per RN, pt desatted to 80s when ambulating with PT; satting well at rest; denies sob    All other systems reviewed and found to be negative with the exception of what has been described above.    MEDICATIONS  (STANDING):  ascorbic acid 500 milliGRAM(s) Oral daily  cefTRIAXone Injectable. 1000 milliGRAM(s) IV Push every 24 hours  cetirizine 10 milliGRAM(s) Oral daily  chlorhexidine 4% Liquid 1 Application(s) Topical <User Schedule>  dexAMETHasone  Injectable 6 milliGRAM(s) IV Push daily  folic acid 1 milliGRAM(s) Oral daily  lactulose Syrup 20 Gram(s) Oral every 8 hours  montelukast 10 milliGRAM(s) Oral daily  multivitamin 1 Tablet(s) Oral daily  octreotide  Infusion 50 MICROgram(s)/Hr (10 mL/Hr) IV Continuous <Continuous>  pantoprazole  Injectable 40 milliGRAM(s) IV Push two times a day  remdesivir  IVPB   IV Intermittent   remdesivir  IVPB 100 milliGRAM(s) IV Intermittent every 24 hours  rifAXIMin 550 milliGRAM(s) Oral two times a day  spironolactone 25 milliGRAM(s) Oral daily  sucralfate 1 Gram(s) Oral four times a day    MEDICATIONS  (PRN):  acetaminophen     Tablet .. 650 milliGRAM(s) Oral every 6 hours PRN Temp greater or equal to 38C (100.4F), Mild Pain (1 - 3)  aluminum hydroxide/magnesium hydroxide/simethicone Suspension 30 milliLiter(s) Oral every 4 hours PRN Dyspepsia  melatonin 3 milliGRAM(s) Oral at bedtime PRN Insomnia  ondansetron Injectable 4 milliGRAM(s) IV Push every 8 hours PRN Nausea and/or Vomiting      PHYSICAL EXAM:  Gen: NAD  CV:  +S1, +S2, regular, no murmurs or rubs  RESP:   lungs clear to auscultation bilaterally, no wheezing, rales, rhonchi, good air entry bilaterally  GI:  abdomen soft, non-tender, non-distended, normal BS, no bruits, no abdominal masses, no palpable masses  :  not examined  MSK:   normal muscle tone, no atrophy, no rigidity, no contractions  EXT:  lower ext edema now nearly resolved - mild pedal edema  NEURO:  AAOX3, no focal neurological deficits, follows all commands, able to move extremities spontaneously    LABS:                            7.1    4.70  )-----------( 41       ( 08 Apr 2025 05:52 )             21.4     04-08    144  |  114[H]  |  52[H]  ----------------------------<  158[H]  4.4   |  24  |  1.53[H]    Ca    9.4      08 Apr 2025 05:52    TPro  5.9[L]  /  Alb  3.0[L]  /  TBili  1.1  /  DBili  x   /  AST  36  /  ALT  15  /  AlkPhos  71  04-08        LIVER FUNCTIONS - ( 08 Apr 2025 05:52 )  Alb: 3.0 g/dL / Pro: 5.9 gm/dL / ALK PHOS: 71 U/L / ALT: 15 U/L / AST: 36 U/L / GGT: x           PT/INR - ( 08 Apr 2025 05:52 )   PT: 18.0 sec;   INR: 1.57 ratio           Urinalysis Basic - ( 08 Apr 2025 05:52 )    Color: x / Appearance: x / SG: x / pH: x  Gluc: 158 mg/dL / Ketone: x  / Bili: x / Urobili: x   Blood: x / Protein: x / Nitrite: x   Leuk Esterase: x / RBC: x / WBC x   Sq Epi: x / Non Sq Epi: x / Bacteria: x              CULTURES:  Culture Results:   No growth at 24 hours (04-06-25 @ 17:03)  Culture Results:   No growth at 24 hours (04-06-25 @ 17:03)    
HPI: 78 YO M with hepatic encephalopathy,, HTN, recent diagnosis of afib not on AC, abdominal telangiectasias, cirrhosis. Pt presenting to  with daughter at bedside, According to her pt started having lightnedness and near syncope yesterday. Pt also had 2 episodes of emesis that was non bloody non bilious. Pt denies abdominal pain, diarrhea, bloody stools or dark stools. In the ED did have hypotension that improved with fluids. hemoglobin was 5.1, plt 49. 2 u prbc and 1 u plt ordered CT abdomen negative for gi bleed. Pt given Protonix and octreotide. and admitted.      Subjective: pt seen this AM, sitting up in chair at bedside, tolerating CLD, no overnight events, Hb 7.6g/dl this AM    REVIEW OF SYSTEMS:    CONSTITUTIONAL: No weakness, fevers or chills  EYES/ENT: No visual changes;  No vertigo or throat pain   NECK: No pain or stiffness  RESPIRATORY: No cough, wheezing, hemoptysis; No shortness of breath  CARDIOVASCULAR: No chest pain or palpitations  GASTROINTESTINAL: No abdominal or epigastric pain. No nausea, vomiting, or hematemesis; No diarrhea or constipation. No melena or hematochezia.  GENITOURINARY: No dysuria, frequency or hematuria  NEUROLOGICAL: No numbness or weakness  SKIN: No itching, rashes    MEDICATIONS  (STANDING):  ascorbic acid 500 milliGRAM(s) Oral daily  cefTRIAXone Injectable. 1000 milliGRAM(s) IV Push every 24 hours  cetirizine 10 milliGRAM(s) Oral daily  folic acid 1 milliGRAM(s) Oral daily  furosemide   Injectable 20 milliGRAM(s) IV Push daily  lactulose Syrup 20 Gram(s) Oral two times a day  montelukast 10 milliGRAM(s) Oral daily  multivitamin 1 Tablet(s) Oral daily  octreotide  Infusion 50 MICROgram(s)/Hr (10 mL/Hr) IV Continuous <Continuous>  pantoprazole  Injectable 40 milliGRAM(s) IV Push two times a day  rifAXIMin 550 milliGRAM(s) Oral two times a day  sucralfate 1 Gram(s) Oral four times a day    MEDICATIONS  (PRN):  acetaminophen     Tablet .. 650 milliGRAM(s) Oral every 6 hours PRN Temp greater or equal to 38C (100.4F), Mild Pain (1 - 3)  aluminum hydroxide/magnesium hydroxide/simethicone Suspension 30 milliLiter(s) Oral every 4 hours PRN Dyspepsia  melatonin 3 milliGRAM(s) Oral at bedtime PRN Insomnia  ondansetron Injectable 4 milliGRAM(s) IV Push every 8 hours PRN Nausea and/or Vomiting      Vital Signs Last 24 Hrs  T(C): 36.8 (05 Apr 2025 07:45), Max: 36.8 (05 Apr 2025 07:45)  T(F): 98.2 (05 Apr 2025 07:45), Max: 98.2 (05 Apr 2025 07:45)  HR: 73 (05 Apr 2025 07:45) (68 - 95)  BP: 126/64 (05 Apr 2025 07:45) (112/53 - 135/74)  RR: 18 (05 Apr 2025 07:45) (18 - 19)  SpO2: 95% (05 Apr 2025 07:45) (95% - 98%)    Parameters below as of 05 Apr 2025 07:45  Patient On (Oxygen Delivery Method): room air      PHYSICAL EXAM:  GENERAL: NAD, lying in bed comfortably  HEAD:  Atraumatic, Normocephalic  EYES: conjunctiva and sclera clear  ENT: Moist mucous membranes  NECK: Supple, No JVD  CHEST/LUNG: Clear to auscultation bilaterally; No rales, rhonchi, wheezing. Unlabored respirations  HEART: Regular rate and rhythm; No murmurs  ABDOMEN: Bowel sounds present; Soft, Nontender, Nondistended.   EXTREMITIES:  2+ Peripheral Pulses, brisk capillary refill. No clubbing, cyanosis, or edema  NERVOUS SYSTEM:  Alert & Oriented X3, speech clear. No deficits   MSK: FROM all 4 extremities, full and equal strength        LABS:                          7.5    5.22  )-----------( 84       ( 05 Apr 2025 13:16 )             22.8     05 Apr 2025 06:09    139    |  111    |  53     ----------------------------<  148    4.3     |  23     |  1.58     Ca    9.7        05 Apr 2025 06:09  Mg     1.8       03 Apr 2025 17:36    TPro  5.9    /  Alb  3.0    /  TBili  3.2    /  DBili  x      /  AST  25     /  ALT  13     /  AlkPhos  64     04 Apr 2025 06:50    LIVER FUNCTIONS - ( 04 Apr 2025 06:50 )  Alb: 3.0 g/dL / Pro: 5.9 gm/dL / ALK PHOS: 64 U/L / ALT: 13 U/L / AST: 25 U/L / GGT: x           PT/INR - ( 03 Apr 2025 17:37 )   PT: 18.2 sec;   INR: 1.55 ratio         PTT - ( 03 Apr 2025 17:37 )  PTT:37.9 sec  CAPILLARY BLOOD GLUCOSE              RADIOLOGY:    
Patient is a 79y old  Male who presents with a chief complaint of symptomatic anemia, GI bleed (05 Apr 2025 08:06)      HPI:  pt feeling ok but some increased SOB this AM  no melena      PAST MEDICAL & SURGICAL HISTORY:  HTN (hypertension)      Hepatic encephalopathy          MEDICATIONS  (STANDING):  ascorbic acid 500 milliGRAM(s) Oral daily  cefTRIAXone Injectable. 1000 milliGRAM(s) IV Push every 24 hours  cetirizine 10 milliGRAM(s) Oral daily  folic acid 1 milliGRAM(s) Oral daily  furosemide   Injectable 20 milliGRAM(s) IV Push daily  lactulose Syrup 20 Gram(s) Oral every 8 hours  montelukast 10 milliGRAM(s) Oral daily  multivitamin 1 Tablet(s) Oral daily  octreotide  Infusion 50 MICROgram(s)/Hr (10 mL/Hr) IV Continuous <Continuous>  pantoprazole  Injectable 40 milliGRAM(s) IV Push two times a day  rifAXIMin 550 milliGRAM(s) Oral two times a day  sucralfate 1 Gram(s) Oral four times a day    MEDICATIONS  (PRN):  acetaminophen     Tablet .. 650 milliGRAM(s) Oral every 6 hours PRN Temp greater or equal to 38C (100.4F), Mild Pain (1 - 3)  aluminum hydroxide/magnesium hydroxide/simethicone Suspension 30 milliLiter(s) Oral every 4 hours PRN Dyspepsia  melatonin 3 milliGRAM(s) Oral at bedtime PRN Insomnia  ondansetron Injectable 4 milliGRAM(s) IV Push every 8 hours PRN Nausea and/or Vomiting      Allergies    penicillin (Rash)  Mushrooms (Anaphylaxis (Severe))    Intolerances        REVIEW OF SYSTEMS:    CONSTITUTIONAL: No weakness, fevers or chills  RESPIRATORY: mild sob  CARDIOVASCULAR: No chest pain or palpitations  GASTROINTESTINAL: as above  All other review of systems is negative unless indicated above.    Vital Signs Last 24 Hrs  T(C): 36.8 (06 Apr 2025 11:35), Max: 36.9 (06 Apr 2025 00:38)  T(F): 98.2 (06 Apr 2025 11:35), Max: 98.4 (06 Apr 2025 00:38)  HR: 64 (06 Apr 2025 11:35) (64 - 100)  BP: 128/60 (06 Apr 2025 11:35) (114/66 - 140/75)  BP(mean): --  RR: 16 (06 Apr 2025 11:35) (16 - 18)  SpO2: 99% (06 Apr 2025 11:35) (87% - 99%)    Parameters below as of 06 Apr 2025 11:35  Patient On (Oxygen Delivery Method): nasal cannula  O2 Flow (L/min): 2      PHYSICAL EXAM:  Constitutional: NAD  Gastrointestinal: soft, NT/ND  LABS:                        7.2    5.16  )-----------( 72       ( 06 Apr 2025 06:36 )             21.6     04-06    139  |  112[H]  |  46[H]  ----------------------------<  143[H]  3.9   |  22  |  1.55[H]    Ca    9.4      06 Apr 2025 06:36            RADIOLOGY & ADDITIONAL STUDIES:
HOSPITALIST ATTENDING PROGRESS NOTE    Chart and meds reviewed.  Patient seen and examined.    CC: gib    Subjective: pt has no acute complaints currently; denies sob or cp. now on room air. having multiple BMs per day; mentation at baseline    All other systems reviewed and found to be negative with the exception of what has been described above.    MEDICATIONS  (STANDING):  ascorbic acid 500 milliGRAM(s) Oral daily  cefTRIAXone Injectable. 1000 milliGRAM(s) IV Push every 24 hours  cetirizine 10 milliGRAM(s) Oral daily  chlorhexidine 4% Liquid 1 Application(s) Topical <User Schedule>  dexAMETHasone  Injectable 6 milliGRAM(s) IV Push daily  folic acid 1 milliGRAM(s) Oral daily  lactulose Syrup 20 Gram(s) Oral every 8 hours  montelukast 10 milliGRAM(s) Oral daily  multivitamin 1 Tablet(s) Oral daily  octreotide  Infusion 50 MICROgram(s)/Hr (10 mL/Hr) IV Continuous <Continuous>  pantoprazole  Injectable 40 milliGRAM(s) IV Push two times a day  remdesivir  IVPB   IV Intermittent   remdesivir  IVPB 100 milliGRAM(s) IV Intermittent every 24 hours  rifAXIMin 550 milliGRAM(s) Oral two times a day  sucralfate 1 Gram(s) Oral four times a day    MEDICATIONS  (PRN):  acetaminophen     Tablet .. 650 milliGRAM(s) Oral every 6 hours PRN Temp greater or equal to 38C (100.4F), Mild Pain (1 - 3)  aluminum hydroxide/magnesium hydroxide/simethicone Suspension 30 milliLiter(s) Oral every 4 hours PRN Dyspepsia  melatonin 3 milliGRAM(s) Oral at bedtime PRN Insomnia  ondansetron Injectable 4 milliGRAM(s) IV Push every 8 hours PRN Nausea and/or Vomiting      PHYSICAL EXAM:  Gen: NAD  CV:  +S1, +S2, regular, no murmurs or rubs  RESP:   lungs clear to auscultation bilaterally, no wheezing, rales, rhonchi, good air entry bilaterally  GI:  abdomen soft, non-tender, non-distended, normal BS, no bruits, no abdominal masses, no palpable masses  :  not examined  MSK:   normal muscle tone, no atrophy, no rigidity, no contractions  EXT:  lower ext edema now nearly resolved - mild pedal edema  NEURO:  AAOX3, no focal neurological deficits, follows all commands, able to move extremities spontaneously    LABS:                            8.2    3.19  )-----------( 55       ( 07 Apr 2025 07:00 )             24.9     04-07    145  |  114[H]  |  47[H]  ----------------------------<  189[H]  4.5   |  25  |  1.62[H]    Ca    9.7      07 Apr 2025 07:00    TPro  6.4  /  Alb  3.1[L]  /  TBili  1.3[H]  /  DBili  0.7[H]  /  AST  25  /  ALT  14  /  AlkPhos  77  04-07        LIVER FUNCTIONS - ( 07 Apr 2025 07:00 )  Alb: 3.1 g/dL / Pro: 6.4 gm/dL / ALK PHOS: 77 U/L / ALT: 14 U/L / AST: 25 U/L / GGT: x           PT/INR - ( 07 Apr 2025 07:00 )   PT: 16.4 sec;   INR: 1.39 ratio           Urinalysis Basic - ( 07 Apr 2025 07:00 )    Color: x / Appearance: x / SG: x / pH: x  Gluc: 189 mg/dL / Ketone: x  / Bili: x / Urobili: x   Blood: x / Protein: x / Nitrite: x   Leuk Esterase: x / RBC: x / WBC x   Sq Epi: x / Non Sq Epi: x / Bacteria: x      ABG - ( 06 Apr 2025 16:29 )  pH, Arterial: 7.37  pH, Blood: x     /  pCO2: 40    /  pO2: 96    / HCO3: 23    / Base Excess: -2.0  /  SaO2: 98                
HOSPITALIST ATTENDING PROGRESS NOTE    Chart and meds reviewed.  Patient seen and examined.    CC: GIB    Subjective: melena. no emesis. per pt had routine para on monday; had negative fobt on tuesday with his regular GI Dr. Amado    All other systems reviewed and found to be negative with the exception of what has been described above.    MEDICATIONS  (STANDING):  ascorbic acid 500 milliGRAM(s) Oral daily  cefTRIAXone Injectable. 1000 milliGRAM(s) IV Push every 24 hours  cetirizine 10 milliGRAM(s) Oral daily  folic acid 1 milliGRAM(s) Oral daily  furosemide   Injectable 20 milliGRAM(s) IV Push daily  montelukast 10 milliGRAM(s) Oral daily  multivitamin 1 Tablet(s) Oral daily  pantoprazole  Injectable 40 milliGRAM(s) IV Push two times a day  sucralfate 1 Gram(s) Oral four times a day    MEDICATIONS  (PRN):  acetaminophen     Tablet .. 650 milliGRAM(s) Oral every 6 hours PRN Temp greater or equal to 38C (100.4F), Mild Pain (1 - 3)  aluminum hydroxide/magnesium hydroxide/simethicone Suspension 30 milliLiter(s) Oral every 4 hours PRN Dyspepsia  melatonin 3 milliGRAM(s) Oral at bedtime PRN Insomnia  ondansetron Injectable 4 milliGRAM(s) IV Push every 8 hours PRN Nausea and/or Vomiting      PHYSICAL EXAM:  Gen: NAD  CV:  +S1, +S2, regular, no murmurs or rubs  RESP:   lungs clear to auscultation bilaterally, no wheezing, rales, rhonchi, good air entry bilaterally  GI:  abdomen soft, non-tender, non-distended, normal BS, no bruits, no abdominal masses, no palpable masses  :  not examined  MSK:   normal muscle tone, no atrophy, no rigidity, no contractions  EXT:  + pitting edema to b/l mid shins  NEURO:  AAOX3, no focal neurological deficits, follows all commands, able to move extremities spontaneously    LABS:                            6.2    4.78  )-----------( 95       ( 04 Apr 2025 06:50 )             18.7     04-04    138  |  109[H]  |  54[H]  ----------------------------<  138[H]  4.1   |  22  |  1.65[H]    Ca    9.1      04 Apr 2025 06:50  Mg     1.8     04-03    TPro  5.9[L]  /  Alb  3.0[L]  /  TBili  3.2[H]  /  DBili  x   /  AST  25  /  ALT  13  /  AlkPhos  64  04-04        LIVER FUNCTIONS - ( 04 Apr 2025 06:50 )  Alb: 3.0 g/dL / Pro: 5.9 gm/dL / ALK PHOS: 64 U/L / ALT: 13 U/L / AST: 25 U/L / GGT: x           PT/INR - ( 03 Apr 2025 17:37 )   PT: 18.2 sec;   INR: 1.55 ratio         PTT - ( 03 Apr 2025 17:37 )  PTT:37.9 sec  Urinalysis Basic - ( 04 Apr 2025 06:50 )    Color: x / Appearance: x / SG: x / pH: x  Gluc: 138 mg/dL / Ketone: x  / Bili: x / Urobili: x   Blood: x / Protein: x / Nitrite: x   Leuk Esterase: x / RBC: x / WBC x   Sq Epi: x / Non Sq Epi: x / Bacteria: x

## 2025-04-08 NOTE — PHYSICAL THERAPY INITIAL EVALUATION ADULT - DIAGNOSIS, PT EVAL
Covid+, Acute Hypoxic Respiratory Failure, Acute blood lose anemia 2/2 to UGIB , Cirrhosis, Hepatic Encephalopathy

## 2025-04-08 NOTE — PHYSICAL THERAPY INITIAL EVALUATION ADULT - PERTINENT HX OF CURRENT PROBLEM, REHAB EVAL
78 Y/O M  to  with daughter at bedside reporting pt started having lightheadedness and near syncope, hypotension in ED, suspected GIB, low hemg, COVID+  h/o  hepatic encephalopathy,, HTN, recent diagnosis of afib not on AC, abdominal telangiectasias, cirrhosis.

## 2025-04-08 NOTE — PHYSICAL THERAPY INITIAL EVALUATION ADULT - WORK/LEISURE ACTIVITY, REHAB EVAL
Hemostasis: Aluminum Chloride and Electrocautery Destruction After The Procedure: No Anesthesia Volume In Cc (Will Not Render If 0): 2 Additional Anesthesia Volume In Cc (Will Not Render If 0): 0 Type Of Destruction Used: Electrodesiccation and Curettage Billing Type: Third-Party Bill Anesthesia Type: 1% lidocaine with 1:100,000 epinephrine and a 1:10 solution of 8.4% sodium bicarbonate Notification Instructions: Patient will be notified of biopsy results. However, patient instructed to call the office if not contacted within 2 weeks. Cryotherapy Text: The wound bed was treated with cryotherapy after the biopsy was performed. Electrodesiccation And Curettage Text: The wound bed was treated with electrodesiccation and curettage after the biopsy was performed. Wound Care: Vaseline Electrodesiccation Text: The wound bed was treated with electrodesiccation after the biopsy was performed. Biopsy Method: double edge Personna blade Render Post-Care Instructions In Note?: yes Consent: Written consent was obtained and risks were reviewed including but not limited to scarring, infection, bleeding, scabbing, incomplete removal, nerve damage and allergy to anesthesia. Detail Level: Detailed Dressing: bandage Depth Of Biopsy: dermis Biopsy Type: H and E Curettage Text: The wound bed was treated with curettage after the biopsy was performed. Silver Nitrate Text: The wound bed was treated with silver nitrate after the biopsy was performed. independent

## 2025-04-08 NOTE — PHYSICAL THERAPY INITIAL EVALUATION ADULT - MODALITIES TREATMENT COMMENTS
Pt returned to bedside chair, +alarm, +IV, , CBIR, NAD, L forearm scratch bleeding at beginning of session- bandage by PT. Will follow pt for endurance and paced breathing to reduce deconditioning. NAD, O2 recovered to 97% on RA. RN aware

## 2025-04-08 NOTE — PROGRESS NOTE ADULT - ASSESSMENT
#ABLA 2/2 UGIB -  esophageal varices   #Symptomatic anemia   #cirrhosis  #thrombocytopenia   #hx of hepatic encephalopathy   - CT abdomen negative for bleed  - Protonix BID, carafate 1gm TID  HGB 7.1 from 8.2 this morning; 1 unit PRBC ordered; no over bleeding - stools brown  bps stable at 100s-130s  c/w rifaximin and lactulose - titrate to 3-4 bms per day  - GI consult - s/p EGD with banding - c/w octreotide drip through 4/8 ( 5 days total)  tolerating regular diet   discussed with GI again today - continue to monitor - no plan for repeat EGD currently    #Suspected sepsis 2/2 covid  -Patient seen earlier with daughter at bedside, awake, alert, watching TV, oriented x 3, feels tired, intermittently drowsy, T 100.4F. with tachycardia, sepsis work up initiated  CT Chest Small right and small-to-moderate left pleural effusions - unable to uptitrate diuresis with renal function - pt currently on room air; will resume spironolactone 25mg po qd  f/u blood culture from 4/6 - NG x 24 hours  no indication for additional abx given no consolidation on ct chest -c/w remdesivir - today day 3 - pt desatted with ambulation today; will repeat amb pusle ox tomorrow; if still desatting may need full 5 days of remdesivir    #Acute metabolic encephalopathy   mentation at baseline   c/w rifaximin and lactulose     #cirrhosis with ascites   US with mod ascites   ir consult for tap tomorrow   trend meld labs    #Acute hypoxic respiratory failure (resolved) 2/2 covid  -suspect volume overload  -CXR with L effusion vs infiltrate   -pt is afebrile, no leukocytosis, no respiratory symptoms   -give additional  lasix s/p transfusion      # BRENNAN on CKD 2/2 above  prerenal  avoid nephrotoxics    # mild to moderate ascites seen on CT  per pt had para outpt with 3.2 L removed earlier this week on monday  and is scheduled to have q3 week infusions   c/w ceftriaxone for SBP prophylaxis  tap with 2400cc removed today 04/08    SCDs  dispo possible next 24-48 pending stabilizatino of hgb, repeat ambulatory pulse ox

## 2025-04-08 NOTE — PROGRESS NOTE ADULT - TIME BILLING
I spent a total of 55 minutes on the date of this encounter coordinating the patient's care. This includes reviewing prior documentation, results and imaging in addition to completing a full history and physical examination on the patient. Further tests, medications, and procedures have been ordered as indicated. Laboratory results and the plan of care were communicated to the patient and/or their family member. Supporting documentation was completed and added to the patient's chart.
Time spent  coordinating the patient's care. This includes reviewing documentation pertinent to this admission, results and imaging. Further tests, medications, and procedures have been ordered as indicated. Laboratory results and the plan of care were communicated to the patient. Discussed care plan with consultants including GI . Supporting documentation was completed and added to the patient's chart.
I spent a total of 55 minutes on the date of this encounter coordinating the patient's care. This includes reviewing prior documentation, results and imaging in addition to completing a full history and physical examination on the patient. Further tests, medications, and procedures have been ordered as indicated. Laboratory results and the plan of care were communicated to the patient and/or their family member. Supporting documentation was completed and added to the patient's chart.
Time spent  coordinating the patient's care. This includes reviewing documentation pertinent to this admission, results and imaging. Further tests, medications, and procedures have been ordered as indicated. Laboratory results and the plan of care were communicated to the patient. Discussed care plan with consultants including gi ID . Supporting documentation was completed and added to the patient's chart.
Review of chart/records, patient examination, face-to-face counseling and documentation.
Time spent  coordinating the patient's care. This includes reviewing documentation pertinent to this admission, results and imaging. Further tests, medications, and procedures have been ordered as indicated. Laboratory results and the plan of care were communicated to the patient. Discussed care plan with consultants including GI. Supporting documentation was completed and added to the patient's chart.

## 2025-04-09 ENCOUNTER — TRANSCRIPTION ENCOUNTER (OUTPATIENT)
Age: 80
End: 2025-04-09

## 2025-04-09 VITALS — WEIGHT: 186.95 LBS

## 2025-04-09 LAB
ALBUMIN SERPL ELPH-MCNC: 3.2 G/DL — LOW (ref 3.3–5)
ALP SERPL-CCNC: 84 U/L — SIGNIFICANT CHANGE UP (ref 40–120)
ALT FLD-CCNC: 20 U/L — SIGNIFICANT CHANGE UP (ref 12–78)
ANION GAP SERPL CALC-SCNC: 6 MMOL/L — SIGNIFICANT CHANGE UP (ref 5–17)
AST SERPL-CCNC: 34 U/L — SIGNIFICANT CHANGE UP (ref 15–37)
BILIRUB SERPL-MCNC: 1.5 MG/DL — HIGH (ref 0.2–1.2)
BLD GP AB SCN SERPL QL: SIGNIFICANT CHANGE UP
BUN SERPL-MCNC: 54 MG/DL — HIGH (ref 7–23)
CALCIUM SERPL-MCNC: 9.7 MG/DL — SIGNIFICANT CHANGE UP (ref 8.5–10.1)
CHLORIDE SERPL-SCNC: 111 MMOL/L — HIGH (ref 96–108)
CO2 SERPL-SCNC: 23 MMOL/L — SIGNIFICANT CHANGE UP (ref 22–31)
CREAT SERPL-MCNC: 1.69 MG/DL — HIGH (ref 0.5–1.3)
EGFR: 41 ML/MIN/1.73M2 — LOW
EGFR: 41 ML/MIN/1.73M2 — LOW
GLUCOSE SERPL-MCNC: 216 MG/DL — HIGH (ref 70–99)
HCT VFR BLD CALC: 26.1 % — LOW (ref 39–50)
HGB BLD-MCNC: 8.7 G/DL — LOW (ref 13–17)
IMMATURE PLATELET FRACTION #: 2 K/UL — LOW (ref 3.9–12.5)
IMMATURE PLATELET FRACTION %: 5.6 % — SIGNIFICANT CHANGE UP (ref 1.6–7.1)
INR BLD: 1.47 RATIO — HIGH (ref 0.85–1.16)
MCHC RBC-ENTMCNC: 30.2 PG — SIGNIFICANT CHANGE UP (ref 27–34)
MCHC RBC-ENTMCNC: 33.3 G/DL — SIGNIFICANT CHANGE UP (ref 32–36)
MCV RBC AUTO: 90.6 FL — SIGNIFICANT CHANGE UP (ref 80–100)
NRBC # BLD AUTO: 0 K/UL — SIGNIFICANT CHANGE UP (ref 0–0)
NRBC # FLD: 0 K/UL — SIGNIFICANT CHANGE UP (ref 0–0)
PLATELET # BLD AUTO: 36 K/UL — LOW (ref 150–400)
PMV BLD: SIGNIFICANT CHANGE UP FL (ref 7–13)
POTASSIUM SERPL-MCNC: 4.2 MMOL/L — SIGNIFICANT CHANGE UP (ref 3.5–5.3)
POTASSIUM SERPL-SCNC: 4.2 MMOL/L — SIGNIFICANT CHANGE UP (ref 3.5–5.3)
PROT SERPL-MCNC: 6.6 GM/DL — SIGNIFICANT CHANGE UP (ref 6–8.3)
PROTHROM AB SERPL-ACNC: 17.3 SEC — HIGH (ref 9.9–13.4)
RBC # BLD: 2.88 M/UL — LOW (ref 4.2–5.8)
RBC # FLD: 21.6 % — HIGH (ref 10.3–14.5)
SODIUM SERPL-SCNC: 140 MMOL/L — SIGNIFICANT CHANGE UP (ref 135–145)
WBC # BLD: 4.63 K/UL — SIGNIFICANT CHANGE UP (ref 3.8–10.5)
WBC # FLD AUTO: 4.63 K/UL — SIGNIFICANT CHANGE UP (ref 3.8–10.5)

## 2025-04-09 PROCEDURE — 99239 HOSP IP/OBS DSCHRG MGMT >30: CPT

## 2025-04-09 RX ORDER — ASPIRIN 325 MG
1 TABLET ORAL
Refills: 0 | DISCHARGE

## 2025-04-09 RX ORDER — SUCRALFATE 1 G
1 TABLET ORAL
Qty: 120 | Refills: 0
Start: 2025-04-09 | End: 2025-05-08

## 2025-04-09 RX ADMIN — Medication 40 MILLIGRAM(S): at 06:08

## 2025-04-09 RX ADMIN — Medication 1 APPLICATION(S): at 05:49

## 2025-04-09 RX ADMIN — Medication 1 GRAM(S): at 05:48

## 2025-04-09 RX ADMIN — DEXAMETHASONE 6 MILLIGRAM(S): 0.5 TABLET ORAL at 12:13

## 2025-04-09 RX ADMIN — Medication 1 TABLET(S): at 12:12

## 2025-04-09 RX ADMIN — Medication 1 GRAM(S): at 12:15

## 2025-04-09 RX ADMIN — FOLIC ACID 1 MILLIGRAM(S): 1 TABLET ORAL at 12:12

## 2025-04-09 RX ADMIN — Medication 10 MILLIGRAM(S): at 12:13

## 2025-04-09 RX ADMIN — Medication 1 GRAM(S): at 00:43

## 2025-04-09 RX ADMIN — Medication 500 MILLIGRAM(S): at 12:13

## 2025-04-09 RX ADMIN — MONTELUKAST SODIUM 10 MILLIGRAM(S): 10 TABLET ORAL at 12:12

## 2025-04-09 RX ADMIN — Medication 25 MILLIGRAM(S): at 12:12

## 2025-04-09 RX ADMIN — LACTULOSE 20 GRAM(S): 10 SOLUTION ORAL at 05:48

## 2025-04-09 NOTE — DIETITIAN INITIAL EVALUATION ADULT - ADD RECOMMEND
1) C/w regular diet to maximize nutritional status. Add ensure plus high protein BID to optimize PO intake (provides 350 kcal, 20g protein/ shake).  2) Encourage protein-rich foods, maximize food preferences.  3) Monitor bowel movements, if no BM for >3 days, consider implementing STRONGER bowel regimen.   4) MVI w/ minerals daily to ensure 100% RDA met.  5) Consider adding thiamine 100 mg daily 2/2 poor PO intake/ malnutrition   6) Monitor lytes/ min and replete prn. Consider obtaining vitamin D 25OH level to assess nutriture. consider checking B6, B12, thiamine, folate, carnitine, and copper levels as malnutrition can cause these to be deficient.  7) Obtain weekly wt to track/trend changes.   8) Confirm goals of care regarding nutrition support.  RD will continue to monitor PO intake, labs, hydration, and wt prn.

## 2025-04-09 NOTE — DISCHARGE NOTE NURSING/CASE MANAGEMENT/SOCIAL WORK - NSDCPEFALRISK_GEN_ALL_CORE
For information on Fall & Injury Prevention, visit: [email or web address] OR  [email or web address] OR  https://www.cdc.gov/steadi/patient.html

## 2025-04-09 NOTE — DISCHARGE NOTE PROVIDER - HOSPITAL COURSE
"80 YO M with hepatic encephalopathy,, HTN, recent diagnosis of afib not on AC, abdominal telangiectasias, cirrhosis. Pt presenting to  with daughter at bedside, According to her pt started having lightnedness and near syncope yesterday. Pt also had 2 episodes of emesis that was non bloody non bilious. Pt denies abdominal pain, diarrhea, bloody stools or dark stools. In the ED did have hypotension that improved with fluids. hemoglobin was 5.1, plt 49. 2 u prbc and 1 u plt ordered CT abdomen negative for gi bleed. Pt given Protonix and octreotide. and admitted."    Hospital Course: pt admitted for upper GI bleed 2/2 variceal bleed. pt underwent egd with banding. received a total of 6 units of prbcs during admission. received octreotide drip x 5 days total. hospital course complicated by worsening ascites - received tap >2000 removed. hospital course complicated by acute hypoxic respiratory failure. found to be covid positive. received remdesivir and decadron while admitted. passed ambulatory pulse ox prior to dc.    #ABLA 2/2 UGIB -  esophageal varices   #Symptomatic anemia   #cirrhosis  #thrombocytopenia   #hx of hepatic encephalopathy   - CT abdomen negative for bleed  - Protonix BID, carafate 1gm TID  no over bleeding - stools brown  bps stable at 100s-130s  c/w rifaximin and lactulose - titrate to 3-4 bms per day  - GI consult - s/p EGD with banding - c/w octreotide drip through 4/8 ( 5 days total)  tolerating regular diet   discussed with GI again today - continue to monitor - no plan for repeat EGD currently  hgb stable after 6th unit of blood   advised pt needs close follow up - has an appointment with his GI dr at [facility] on friday - advised repeat CBC at this appointment     #Suspected sepsis 2/2 covid  -Patient seen earlier with daughter at bedside, awake, alert, watching TV, oriented x 3, feels tired, intermittently drowsy, T 100.4F. with tachycardia, sepsis work up initiated  CT Chest Small right and small-to-moderate left pleural effusions - unable to uptitrate diuresis with renal function - pt currently on room air; will resume spironolactone 25mg po qd  f/u blood culture from 4/6 - NG x 48 hours  no indication for additional abx given no consolidation on ct chest -c/w remdesivir - repeat ambulatory pulse ox is normal.     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

## 2025-04-09 NOTE — DISCHARGE NOTE NURSING/CASE MANAGEMENT/SOCIAL WORK - PATIENT PORTAL LINK FT
You can access the FollowMyHealth Patient Portal offered by [facility] by registering at the following website: [email or web address]. By joining [employer]’s FollowMyHealth portal, you will also be able to view your health information using other applications (apps) compatible with our system.

## 2025-04-09 NOTE — DIETITIAN INITIAL EVALUATION ADULT - NSFNSGIIOFT_GEN_A_CORE
I&O's Detail    08 Apr 2025 07:01  -  09 Apr 2025 07:00  --------------------------------------------------------  IN:    PRBCs (Packed Red Blood Cells): 351 mL  Total IN: 351 mL    OUT:  Total OUT: 0 mL    Total NET: 351 mL

## 2025-04-09 NOTE — DISCHARGE NOTE PROVIDER - NSDCCPCAREPLAN_GEN_ALL_CORE_FT
PRINCIPAL DISCHARGE DIAGNOSIS  Diagnosis: GIB (gastrointestinal bleeding)  Assessment and Plan of Treatment: you were found to have bleeding esophageal varices on egd. they were banded. you were given 5 days of ocreotide drip and started on sucralfate. please continue to take sucralfate four times daily with meals. please see your GI dr on friday and have a repeat blood count (blood work) on friday to ensure your hemoglobin in stable.      SECONDARY DISCHARGE DIAGNOSES  Diagnosis: 2019 novel coronavirus disease (COVID-19)  Assessment and Plan of Treatment: you were treated for covid with remdesivir while hospitalized. there is no further treatment required as you passed your ambulatory pulse oximetry.    Diagnosis: Cirrhosis of liver with ascites  Assessment and Plan of Treatment: you had 2400cc removed from your abdomen via paracentesis. your kidney function does not allow for diuresis with lasix. you may continue spironolactone. please follow up with your GI dr for routine outpatient paracentesis. this may need to be done weekly

## 2025-04-09 NOTE — DISCHARGE NOTE NURSING/CASE MANAGEMENT/SOCIAL WORK - FINANCIAL ASSISTANCE
[facility] provides services at a reduced cost to those who are determined to be eligible through [facility]’s financial assistance program. Information regarding [facility]’s financial assistance program can be found by going to [email or web address] or by calling [phone].

## 2025-04-09 NOTE — DIETITIAN INITIAL EVALUATION ADULT - PERTINENT LABORATORY DATA
04-09    140  |  111[H]  |  54[H]  ----------------------------<  216[H]  4.2   |  23  |  1.69[H]    Ca    9.7      09 Apr 2025 06:41    TPro  6.6  /  Alb  3.2[L]  /  TBili  1.5[H]  /  DBili  x   /  AST  34  /  ALT  20  /  AlkPhos  84  04-09  A1C with Estimated Average Glucose Result: 4.8 % (09-07-24 @ 07:52)

## 2025-04-09 NOTE — DISCHARGE NOTE PROVIDER - NSDCCAREPROVSEEN_GEN_ALL_CORE_FT
Alex, Maude Vogt, Parviz Matias, Ludwig Marcelino, Tae Escobar, Hay Hermosillo, Shanita Monsivais, William FRAGOSO

## 2025-04-09 NOTE — DIETITIAN INITIAL EVALUATION ADULT - PERTINENT MEDS FT
MEDICATIONS  (STANDING):  ascorbic acid 500 milliGRAM(s) Oral daily  cefTRIAXone Injectable. 1000 milliGRAM(s) IV Push every 24 hours  cetirizine 10 milliGRAM(s) Oral daily  chlorhexidine 4% Liquid 1 Application(s) Topical <User Schedule>  dexAMETHasone  Injectable 6 milliGRAM(s) IV Push daily  folic acid 1 milliGRAM(s) Oral daily  lactulose Syrup 20 Gram(s) Oral every 8 hours  montelukast 10 milliGRAM(s) Oral daily  multivitamin 1 Tablet(s) Oral daily  octreotide  Infusion 50 MICROgram(s)/Hr (10 mL/Hr) IV Continuous <Continuous>  pantoprazole  Injectable 40 milliGRAM(s) IV Push two times a day  remdesivir  IVPB   IV Intermittent   remdesivir  IVPB 100 milliGRAM(s) IV Intermittent every 24 hours  rifAXIMin 550 milliGRAM(s) Oral two times a day  spironolactone 25 milliGRAM(s) Oral daily  sucralfate 1 Gram(s) Oral four times a day    MEDICATIONS  (PRN):  acetaminophen     Tablet .. 650 milliGRAM(s) Oral every 6 hours PRN Temp greater or equal to 38C (100.4F), Mild Pain (1 - 3)  aluminum hydroxide/magnesium hydroxide/simethicone Suspension 30 milliLiter(s) Oral every 4 hours PRN Dyspepsia  melatonin 3 milliGRAM(s) Oral at bedtime PRN Insomnia  ondansetron Injectable 4 milliGRAM(s) IV Push every 8 hours PRN Nausea and/or Vomiting

## 2025-04-09 NOTE — DIETITIAN INITIAL EVALUATION ADULT - FLUID ACCUMULATION
As per flow sheet, 1+ right leg;  left leg;  generalized;  left arm;  right arm;  right ankle;  left ankle;  left foot;  right foot edema.

## 2025-04-09 NOTE — DISCHARGE NOTE PROVIDER - NSDCMRMEDTOKEN_GEN_ALL_CORE_FT
Advair Diskus 500 mcg-50 mcg/inh inhalation powder: 1 puff(s) inhaled 2 times a day  ascorbic acid 500 mg oral tablet: 1 tab(s) orally once a day (in the afternoon)  carvedilol 3.125 mg oral tablet: 1 tab(s) orally 2 times a day  empagliflozin 25 mg oral tablet: 1 tab(s) orally once a day (in the morning)  folic acid 1 mg oral tablet: 1 tab(s) orally once a day (in the afternoon)  lactulose 10 g/15 mL oral and rectal liquid: 30 milliliter(s) orally every 8 hours  loratadine 10 mg oral tablet: 1 tab(s) orally once a day (at bedtime)  magnesium oxide 400 mg oral tablet: 1 tab(s) orally once a day (at bedtime)  montelukast 10 mg oral tablet: 1 tab(s) orally once a day (at bedtime)  Multiple Vitamins with Minerals oral tablet, chewable: 1 tab(s) chewed once a day (in the morning)  ondansetron: prn as needed for  nausea  pantoprazole 40 mg oral delayed release tablet: 1 tab(s) orally 2 times a day  rifAXIMin 550 mg oral tablet: 1 tab(s) orally 2 times a day  spironolactone 100 mg oral tablet: 1 tab(s) orally once a day (in the morning)  sucralfate 1 g oral tablet: 1 tab(s) orally 4 times a day  Tylenol Extra Strength 500 mg oral tablet: 2 tab(s) orally 1 to 2 times a day as needed for  mild pain  Ventolin HFA 90 mcg/inh inhalation aerosol: 2 puff(s) inhaled every 4 to 6 hours as needed for  shortness of breath and/or wheezing  Vitamin B-12 1000 mcg oral tablet: 1 tab(s) orally once a day  Vitamin D3 1250 mcg (50,000 intl units) oral capsule: 1 cap(s) orally once a week on Sundays

## 2025-04-09 NOTE — DIETITIAN INITIAL EVALUATION ADULT - LAB (SPECIFY)
Consider obtaining vitamin D 25OH level to assess nutriture. consider checking B6, B12, thiamine, folate, carnitine, and copper levels as malnutrition can cause these to be deficient.

## 2025-04-09 NOTE — DIETITIAN INITIAL EVALUATION ADULT - ORAL INTAKE PTA/DIET HISTORY
Lives at home w/ daughter who assists in cooking/grocery shopping. Reports he has "never been a good eater", consumes breakfast and dinner w/ a small lunch. Consumes 1-2 ensure shakes daily. Likely meeting < 75% ENN x >/=1 mos. Denies n/v/d/c. 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

## 2025-04-09 NOTE — DISCHARGE NOTE PROVIDER - NSDCPNSUBOBJ_GEN_ALL_CORE
HOSPITALIST ATTENDING PROGRESS NOTE    Chart and meds reviewed.  Patient seen and examined.    CC: gib    Subjective: no acute complaints; feels well. passed repeat ambulatory pulse ox.     All other systems reviewed and found to be negative with the exception of what has been described above.    MEDICATIONS  (STANDING):  ascorbic acid 500 milliGRAM(s) Oral daily  cefTRIAXone Injectable. 1000 milliGRAM(s) IV Push every 24 hours  cetirizine 10 milliGRAM(s) Oral daily  chlorhexidine 4% Liquid 1 Application(s) Topical <User Schedule>  dexAMETHasone  Injectable 6 milliGRAM(s) IV Push daily  folic acid 1 milliGRAM(s) Oral daily  lactulose Syrup 20 Gram(s) Oral every 8 hours  montelukast 10 milliGRAM(s) Oral daily  multivitamin 1 Tablet(s) Oral daily  octreotide  Infusion 50 MICROgram(s)/Hr (10 mL/Hr) IV Continuous <Continuous>  pantoprazole  Injectable 40 milliGRAM(s) IV Push two times a day  remdesivir  IVPB   IV Intermittent   remdesivir  IVPB 100 milliGRAM(s) IV Intermittent every 24 hours  rifAXIMin 550 milliGRAM(s) Oral two times a day  spironolactone 25 milliGRAM(s) Oral daily  sucralfate 1 Gram(s) Oral four times a day    MEDICATIONS  (PRN):  acetaminophen     Tablet .. 650 milliGRAM(s) Oral every 6 hours PRN Temp greater or equal to 38C (100.4F), Mild Pain (1 - 3)  aluminum hydroxide/magnesium hydroxide/simethicone Suspension 30 milliLiter(s) Oral every 4 hours PRN Dyspepsia  melatonin 3 milliGRAM(s) Oral at bedtime PRN Insomnia  ondansetron Injectable 4 milliGRAM(s) IV Push every 8 hours PRN Nausea and/or Vomiting      PHYSICAL EXAM:  Gen: NAD  CV:  +S1, +S2, regular, no murmurs or rubs  RESP:   lungs clear to auscultation bilaterally, no wheezing, rales, rhonchi, good air entry bilaterally  GI:  abdomen soft, non-tender, non-distended, normal BS, no bruits, no abdominal masses, no palpable masses  :  not examined  MSK:   normal muscle tone, no atrophy, no rigidity, no contractions  EXT:  lower ext edema now nearly resolved - mild pedal edema  NEURO:  AAOX3, no focal neurological deficits, follows all commands, able to move extremities spontaneously  LABS:                            8.7    4.63  )-----------( 36       ( 09 Apr 2025 06:41 )             26.1     04-09    140  |  111[H]  |  54[H]  ----------------------------<  216[H]  4.2   |  23  |  1.69[H]    Ca    9.7      09 Apr 2025 06:41    TPro  6.6  /  Alb  3.2[L]  /  TBili  1.5[H]  /  DBili  x   /  AST  34  /  ALT  20  /  AlkPhos  84  04-09        LIVER FUNCTIONS - ( 09 Apr 2025 06:41 )  Alb: 3.2 g/dL / Pro: 6.6 gm/dL / ALK PHOS: 84 U/L / ALT: 20 U/L / AST: 34 U/L / GGT: x           PT/INR - ( 09 Apr 2025 06:41 )   PT: 17.3 sec;   INR: 1.47 ratio           Urinalysis Basic - ( 09 Apr 2025 06:41 )    Color: x / Appearance: x / SG: x / pH: x  Gluc: 216 mg/dL / Ketone: x  / Bili: x / Urobili: x   Blood: x / Protein: x / Nitrite: x   Leuk Esterase: x / RBC: x / WBC x   Sq Epi: x / Non Sq Epi: x / Bacteria: x              CULTURES:  Culture Results:   No growth at 48 Hours (04-06-25 @ 17:03)  Culture Results:   No growth at 48 Hours (04-06-25 @ 17:03)      Additional results/Imaging, I have personally reviewed:    Telemetry, personally reviewed:

## 2025-04-09 NOTE — DIETITIAN INITIAL EVALUATION ADULT - OTHER INFO
78 YO M with hepatic encephalopathy, HTN, recent diagnosis of afib not on AC, abdominal telangiectasias, cirrhosis. Pt presenting to  with daughter at bedside, According to her pt started having lightnedness and near syncope yesterday. Pt also had 2 episodes of emesis that was non bloody non bilious. Pt denies abdominal pain, diarrhea, bloody stools or dark stools. In the ED did have hypotension that improved with fluids. hemoglobin was 5.1, plt 49. 2 u prbc and 1 u plt ordered CT abdomen negative for gi bleed. s/p EGD on 4/4/25 with endoscopic band ligation of esophageal varix performed. CT Chest with small L effusion. COVID positive 4/6. S/p 2400 milliLiters paracentesis 4/8. Admit dx: ABLA 2/2 UGIB -  esophageal varices, symptomatic anemia, cirrhosis, thrombocytopenia, Suspected sepsis 2/2 covid, Acute metabolic encephalopathy, cirrhosis w/ ascites, BRENNAN on CKD.     Pt seen by nutr services and dx'd w/ mal on previous admission 12/14/23; still meets criteria. Upon RD visit, pt reports no change in appetite since admision (x6 days). Reports consuming ~50% egg sandwich for breakfast this morning. Unable to obtain bed scale wt 2/2 pt sitting upright in chair. Per EMR, admit wt of 187# (4/3). Per pt, UBW of 186# x a few days ago. Per previous RD note: Bed scale wt of 183# taken by RD on 12/14/23. No significant wt changes noted at this time. NFPE reveals mild-moderate muscle/ fat wasting. Noted w/ trace edema which may be skewing wt/ appearance and masking further muscle/ fat losses Rec to c/w regular diet. Add ensure plus high protein BID to optimize PO intake (provides 350 kcal, 20g protein/ shake). See additional recs below.

## 2025-04-11 LAB
CULTURE RESULTS: SIGNIFICANT CHANGE UP
CULTURE RESULTS: SIGNIFICANT CHANGE UP
SPECIMEN SOURCE: SIGNIFICANT CHANGE UP
SPECIMEN SOURCE: SIGNIFICANT CHANGE UP

## 2025-04-17 DIAGNOSIS — Z88.0 ALLERGY STATUS TO PENICILLIN: ICD-10-CM

## 2025-04-17 DIAGNOSIS — I10 ESSENTIAL (PRIMARY) HYPERTENSION: ICD-10-CM

## 2025-04-17 DIAGNOSIS — N17.9 ACUTE KIDNEY FAILURE, UNSPECIFIED: ICD-10-CM

## 2025-04-17 DIAGNOSIS — I85.11 SECONDARY ESOPHAGEAL VARICES WITH BLEEDING: ICD-10-CM

## 2025-04-17 DIAGNOSIS — D69.6 THROMBOCYTOPENIA, UNSPECIFIED: ICD-10-CM

## 2025-04-17 DIAGNOSIS — E87.70 FLUID OVERLOAD, UNSPECIFIED: ICD-10-CM

## 2025-04-17 DIAGNOSIS — I48.91 UNSPECIFIED ATRIAL FIBRILLATION: ICD-10-CM

## 2025-04-17 DIAGNOSIS — K74.60 UNSPECIFIED CIRRHOSIS OF LIVER: ICD-10-CM

## 2025-04-17 DIAGNOSIS — A41.9 SEPSIS, UNSPECIFIED ORGANISM: ICD-10-CM

## 2025-04-17 DIAGNOSIS — D62 ACUTE POSTHEMORRHAGIC ANEMIA: ICD-10-CM

## 2025-04-17 DIAGNOSIS — U07.1 COVID-19: ICD-10-CM

## 2025-04-17 DIAGNOSIS — G93.41 METABOLIC ENCEPHALOPATHY: ICD-10-CM

## 2025-04-17 DIAGNOSIS — R18.8 OTHER ASCITES: ICD-10-CM

## 2025-04-17 DIAGNOSIS — Z91.018 ALLERGY TO OTHER FOODS: ICD-10-CM

## 2025-04-17 DIAGNOSIS — J96.01 ACUTE RESPIRATORY FAILURE WITH HYPOXIA: ICD-10-CM

## 2025-04-19 PROBLEM — Z00.00 ENCOUNTER FOR PREVENTIVE HEALTH EXAMINATION: Status: ACTIVE | Noted: 2025-04-19

## 2025-04-21 RX ORDER — CIPROFLOXACIN HCL 250 MG
1 TABLET ORAL
Refills: 0 | DISCHARGE
Start: 2025-04-21

## 2025-04-26 ENCOUNTER — INPATIENT (INPATIENT)
Facility: HOSPITAL | Age: 80
LOS: 5 days | Discharge: HOME | DRG: 379 | End: 2025-05-02
Attending: STUDENT IN AN ORGANIZED HEALTH CARE EDUCATION/TRAINING PROGRAM | Admitting: SURGERY
Payer: OTHER GOVERNMENT

## 2025-04-26 VITALS
OXYGEN SATURATION: 100 % | DIASTOLIC BLOOD PRESSURE: 89 MMHG | HEIGHT: 68 IN | TEMPERATURE: 97 F | RESPIRATION RATE: 16 BRPM | HEART RATE: 68 BPM | SYSTOLIC BLOOD PRESSURE: 115 MMHG

## 2025-04-26 DIAGNOSIS — K62.5 HEMORRHAGE OF ANUS AND RECTUM: ICD-10-CM

## 2025-04-26 LAB
ADD ON TEST-SPECIMEN IN LAB: SIGNIFICANT CHANGE UP
ALBUMIN SERPL ELPH-MCNC: 2.6 G/DL — LOW (ref 3.3–5)
ALBUMIN SERPL ELPH-MCNC: 2.7 G/DL — LOW (ref 3.3–5)
ALP SERPL-CCNC: 109 U/L — SIGNIFICANT CHANGE UP (ref 40–120)
ALP SERPL-CCNC: 95 U/L — SIGNIFICANT CHANGE UP (ref 40–120)
ALT FLD-CCNC: 18 U/L — SIGNIFICANT CHANGE UP (ref 12–78)
ALT FLD-CCNC: 20 U/L — SIGNIFICANT CHANGE UP (ref 12–78)
ANION GAP SERPL CALC-SCNC: 10 MMOL/L — SIGNIFICANT CHANGE UP (ref 5–17)
ANION GAP SERPL CALC-SCNC: 9 MMOL/L — SIGNIFICANT CHANGE UP (ref 5–17)
ANISOCYTOSIS BLD QL: ABNORMAL
APPEARANCE UR: CLEAR — SIGNIFICANT CHANGE UP
APTT BLD: 37.7 SEC — HIGH (ref 26.1–36.8)
AST SERPL-CCNC: 37 U/L — SIGNIFICANT CHANGE UP (ref 15–37)
AST SERPL-CCNC: 38 U/L — HIGH (ref 15–37)
BASOPHILS # BLD AUTO: 0.02 K/UL — SIGNIFICANT CHANGE UP (ref 0–0.2)
BASOPHILS NFR BLD AUTO: 0.3 % — SIGNIFICANT CHANGE UP (ref 0–2)
BILIRUB SERPL-MCNC: 1.6 MG/DL — HIGH (ref 0.2–1.2)
BILIRUB SERPL-MCNC: 2 MG/DL — HIGH (ref 0.2–1.2)
BILIRUB UR-MCNC: NEGATIVE — SIGNIFICANT CHANGE UP
BLD GP AB SCN SERPL QL: SIGNIFICANT CHANGE UP
BUN SERPL-MCNC: 41 MG/DL — HIGH (ref 7–23)
BUN SERPL-MCNC: 45 MG/DL — HIGH (ref 7–23)
CALCIUM SERPL-MCNC: 9 MG/DL — SIGNIFICANT CHANGE UP (ref 8.5–10.1)
CALCIUM SERPL-MCNC: 9.2 MG/DL — SIGNIFICANT CHANGE UP (ref 8.5–10.1)
CHLORIDE SERPL-SCNC: 104 MMOL/L — SIGNIFICANT CHANGE UP (ref 96–108)
CHLORIDE SERPL-SCNC: 108 MMOL/L — SIGNIFICANT CHANGE UP (ref 96–108)
CO2 SERPL-SCNC: 20 MMOL/L — LOW (ref 22–31)
CO2 SERPL-SCNC: 23 MMOL/L — SIGNIFICANT CHANGE UP (ref 22–31)
COLOR SPEC: YELLOW — SIGNIFICANT CHANGE UP
CREAT SERPL-MCNC: 1.97 MG/DL — HIGH (ref 0.5–1.3)
CREAT SERPL-MCNC: 2.04 MG/DL — HIGH (ref 0.5–1.3)
DACRYOCYTES BLD QL SMEAR: SLIGHT — SIGNIFICANT CHANGE UP
DIFF PNL FLD: NEGATIVE — SIGNIFICANT CHANGE UP
EGFR: 32 ML/MIN/1.73M2 — LOW
EGFR: 32 ML/MIN/1.73M2 — LOW
EGFR: 34 ML/MIN/1.73M2 — LOW
EGFR: 34 ML/MIN/1.73M2 — LOW
EOSINOPHIL # BLD AUTO: 0.24 K/UL — SIGNIFICANT CHANGE UP (ref 0–0.5)
EOSINOPHIL NFR BLD AUTO: 3.5 % — SIGNIFICANT CHANGE UP (ref 0–6)
GLUCOSE SERPL-MCNC: 146 MG/DL — HIGH (ref 70–99)
GLUCOSE SERPL-MCNC: 164 MG/DL — HIGH (ref 70–99)
GLUCOSE UR QL: 100 MG/DL
HCT VFR BLD CALC: 17.6 % — CRITICAL LOW (ref 39–50)
HCT VFR BLD CALC: 23.3 % — LOW (ref 39–50)
HCT VFR BLD CALC: 25.5 % — LOW (ref 39–50)
HGB BLD-MCNC: 5.4 G/DL — CRITICAL LOW (ref 13–17)
HGB BLD-MCNC: 7.4 G/DL — LOW (ref 13–17)
HGB BLD-MCNC: 8.2 G/DL — LOW (ref 13–17)
HYPOCHROMIA BLD QL: SLIGHT — SIGNIFICANT CHANGE UP
IMM GRANULOCYTES # BLD AUTO: 0.09 K/UL — HIGH (ref 0–0.07)
IMM GRANULOCYTES NFR BLD AUTO: 1.3 % — HIGH (ref 0–0.9)
IMMATURE PLATELET FRACTION #: 1.9 K/UL — LOW (ref 3.9–12.5)
IMMATURE PLATELET FRACTION #: 2 K/UL — LOW (ref 3.9–12.5)
IMMATURE PLATELET FRACTION #: 2.7 K/UL — LOW (ref 3.9–12.5)
IMMATURE PLATELET FRACTION %: 2.5 % — SIGNIFICANT CHANGE UP (ref 1.6–7.1)
IMMATURE PLATELET FRACTION %: 2.7 % — SIGNIFICANT CHANGE UP (ref 1.6–7.1)
IMMATURE PLATELET FRACTION %: 2.7 % — SIGNIFICANT CHANGE UP (ref 1.6–7.1)
INR BLD: 1.4 RATIO — HIGH (ref 0.85–1.16)
KETONES UR-MCNC: NEGATIVE MG/DL — SIGNIFICANT CHANGE UP
LACTATE SERPL-SCNC: 3 MMOL/L — HIGH (ref 0.7–2)
LACTATE SERPL-SCNC: 3.3 MMOL/L — HIGH (ref 0.7–2)
LACTATE SERPL-SCNC: 4.9 MMOL/L — CRITICAL HIGH (ref 0.7–2)
LEUKOCYTE ESTERASE UR-ACNC: NEGATIVE — SIGNIFICANT CHANGE UP
LYMPHOCYTES # BLD AUTO: 0.8 K/UL — LOW (ref 1–3.3)
LYMPHOCYTES NFR BLD AUTO: 11.6 % — LOW (ref 13–44)
MACROCYTES BLD QL: SLIGHT — SIGNIFICANT CHANGE UP
MAGNESIUM SERPL-MCNC: 1.7 MG/DL — SIGNIFICANT CHANGE UP (ref 1.6–2.6)
MAGNESIUM SERPL-MCNC: 4 MG/DL — HIGH (ref 1.6–2.6)
MANUAL SMEAR VERIFICATION: SIGNIFICANT CHANGE UP
MCHC RBC-ENTMCNC: 30.6 PG — SIGNIFICANT CHANGE UP (ref 27–34)
MCHC RBC-ENTMCNC: 30.7 G/DL — LOW (ref 32–36)
MCHC RBC-ENTMCNC: 30.9 PG — SIGNIFICANT CHANGE UP (ref 27–34)
MCHC RBC-ENTMCNC: 31.4 PG — SIGNIFICANT CHANGE UP (ref 27–34)
MCHC RBC-ENTMCNC: 31.8 G/DL — LOW (ref 32–36)
MCHC RBC-ENTMCNC: 32.2 G/DL — SIGNIFICANT CHANGE UP (ref 32–36)
MCV RBC AUTO: 102.3 FL — HIGH (ref 80–100)
MCV RBC AUTO: 96.2 FL — SIGNIFICANT CHANGE UP (ref 80–100)
MCV RBC AUTO: 96.3 FL — SIGNIFICANT CHANGE UP (ref 80–100)
MICROCYTES BLD QL: SLIGHT — SIGNIFICANT CHANGE UP
MONOCYTES # BLD AUTO: 0.8 K/UL — SIGNIFICANT CHANGE UP (ref 0–0.9)
MONOCYTES NFR BLD AUTO: 11.6 % — SIGNIFICANT CHANGE UP (ref 2–14)
NEUTROPHILS # BLD AUTO: 4.93 K/UL — SIGNIFICANT CHANGE UP (ref 1.8–7.4)
NEUTROPHILS NFR BLD AUTO: 71.7 % — SIGNIFICANT CHANGE UP (ref 43–77)
NITRITE UR-MCNC: NEGATIVE — SIGNIFICANT CHANGE UP
NRBC # BLD AUTO: 0 K/UL — SIGNIFICANT CHANGE UP (ref 0–0)
NRBC # BLD AUTO: 0 K/UL — SIGNIFICANT CHANGE UP (ref 0–0)
NRBC # BLD AUTO: 0.02 K/UL — HIGH (ref 0–0)
NRBC # FLD: 0 K/UL — SIGNIFICANT CHANGE UP (ref 0–0)
NRBC # FLD: 0 K/UL — SIGNIFICANT CHANGE UP (ref 0–0)
NRBC # FLD: 0.02 K/UL — HIGH (ref 0–0)
NRBC BLD AUTO-RTO: 0 /100 WBCS — SIGNIFICANT CHANGE UP (ref 0–0)
OVALOCYTES BLD QL SMEAR: SLIGHT — SIGNIFICANT CHANGE UP
PH UR: 7 — SIGNIFICANT CHANGE UP (ref 5–8)
PHOSPHATE SERPL-MCNC: 1.7 MG/DL — LOW (ref 2.5–4.5)
PHOSPHATE SERPL-MCNC: 3.6 MG/DL — SIGNIFICANT CHANGE UP (ref 2.5–4.5)
PLAT MORPH BLD: NORMAL — SIGNIFICANT CHANGE UP
PLATELET # BLD AUTO: 70 K/UL — LOW (ref 150–400)
PLATELET # BLD AUTO: 78 K/UL — LOW (ref 150–400)
PLATELET # BLD AUTO: 99 K/UL — LOW (ref 150–400)
PMV BLD: 11.1 FL — SIGNIFICANT CHANGE UP (ref 7–13)
PMV BLD: 11.5 FL — SIGNIFICANT CHANGE UP (ref 7–13)
PMV BLD: 12 FL — SIGNIFICANT CHANGE UP (ref 7–13)
POLYCHROMASIA BLD QL SMEAR: SLIGHT — SIGNIFICANT CHANGE UP
POTASSIUM SERPL-MCNC: 4.6 MMOL/L — SIGNIFICANT CHANGE UP (ref 3.5–5.3)
POTASSIUM SERPL-MCNC: 5 MMOL/L — SIGNIFICANT CHANGE UP (ref 3.5–5.3)
POTASSIUM SERPL-SCNC: 4.6 MMOL/L — SIGNIFICANT CHANGE UP (ref 3.5–5.3)
POTASSIUM SERPL-SCNC: 5 MMOL/L — SIGNIFICANT CHANGE UP (ref 3.5–5.3)
PROT SERPL-MCNC: 5.6 GM/DL — LOW (ref 6–8.3)
PROT SERPL-MCNC: 5.8 GM/DL — LOW (ref 6–8.3)
PROT UR-MCNC: NEGATIVE MG/DL — SIGNIFICANT CHANGE UP
PROTHROM AB SERPL-ACNC: 16 SEC — HIGH (ref 9.9–13.4)
RBC # BLD: 1.72 M/UL — LOW (ref 4.2–5.8)
RBC # BLD: 2.42 M/UL — LOW (ref 4.2–5.8)
RBC # BLD: 2.65 M/UL — LOW (ref 4.2–5.8)
RBC # FLD: 23.1 % — HIGH (ref 10.3–14.5)
RBC # FLD: 23.7 % — HIGH (ref 10.3–14.5)
RBC # FLD: 28 % — HIGH (ref 10.3–14.5)
RBC BLD AUTO: ABNORMAL
SCHISTOCYTES BLD QL AUTO: SLIGHT — SIGNIFICANT CHANGE UP
SODIUM SERPL-SCNC: 136 MMOL/L — SIGNIFICANT CHANGE UP (ref 135–145)
SODIUM SERPL-SCNC: 138 MMOL/L — SIGNIFICANT CHANGE UP (ref 135–145)
SP GR SPEC: 1.03 — SIGNIFICANT CHANGE UP (ref 1–1.03)
UROBILINOGEN FLD QL: 0.2 MG/DL — SIGNIFICANT CHANGE UP (ref 0.2–1)
WBC # BLD: 6.27 K/UL — SIGNIFICANT CHANGE UP (ref 3.8–10.5)
WBC # BLD: 6.51 K/UL — SIGNIFICANT CHANGE UP (ref 3.8–10.5)
WBC # BLD: 6.88 K/UL — SIGNIFICANT CHANGE UP (ref 3.8–10.5)
WBC # FLD AUTO: 6.27 K/UL — SIGNIFICANT CHANGE UP (ref 3.8–10.5)
WBC # FLD AUTO: 6.51 K/UL — SIGNIFICANT CHANGE UP (ref 3.8–10.5)
WBC # FLD AUTO: 6.88 K/UL — SIGNIFICANT CHANGE UP (ref 3.8–10.5)
WBC MORPHOLOGY: NORMAL — SIGNIFICANT CHANGE UP

## 2025-04-26 PROCEDURE — 94761 N-INVAS EAR/PLS OXIMETRY MLT: CPT

## 2025-04-26 PROCEDURE — 85610 PROTHROMBIN TIME: CPT

## 2025-04-26 PROCEDURE — 93005 ELECTROCARDIOGRAM TRACING: CPT

## 2025-04-26 PROCEDURE — 93010 ELECTROCARDIOGRAM REPORT: CPT

## 2025-04-26 PROCEDURE — P9059: CPT

## 2025-04-26 PROCEDURE — P9100: CPT

## 2025-04-26 PROCEDURE — 80048 BASIC METABOLIC PNL TOTAL CA: CPT

## 2025-04-26 PROCEDURE — 84484 ASSAY OF TROPONIN QUANT: CPT

## 2025-04-26 PROCEDURE — 99291 CRITICAL CARE FIRST HOUR: CPT

## 2025-04-26 PROCEDURE — 94760 N-INVAS EAR/PLS OXIMETRY 1: CPT

## 2025-04-26 PROCEDURE — 81003 URINALYSIS AUTO W/O SCOPE: CPT

## 2025-04-26 PROCEDURE — 85027 COMPLETE CBC AUTOMATED: CPT

## 2025-04-26 PROCEDURE — 85025 COMPLETE CBC W/AUTO DIFF WBC: CPT

## 2025-04-26 PROCEDURE — 71275 CT ANGIOGRAPHY CHEST: CPT | Mod: MC

## 2025-04-26 PROCEDURE — P9011: CPT

## 2025-04-26 PROCEDURE — 84100 ASSAY OF PHOSPHORUS: CPT

## 2025-04-26 PROCEDURE — 74178 CT ABD&PLV WO CNTR FLWD CNTR: CPT | Mod: 26

## 2025-04-26 PROCEDURE — 71045 X-RAY EXAM CHEST 1 VIEW: CPT

## 2025-04-26 PROCEDURE — 36430 TRANSFUSION BLD/BLD COMPNT: CPT

## 2025-04-26 PROCEDURE — 94640 AIRWAY INHALATION TREATMENT: CPT

## 2025-04-26 PROCEDURE — 87641 MR-STAPH DNA AMP PROBE: CPT

## 2025-04-26 PROCEDURE — 83735 ASSAY OF MAGNESIUM: CPT

## 2025-04-26 PROCEDURE — 71045 X-RAY EXAM CHEST 1 VIEW: CPT | Mod: 26

## 2025-04-26 PROCEDURE — 36415 COLL VENOUS BLD VENIPUNCTURE: CPT

## 2025-04-26 PROCEDURE — 86923 COMPATIBILITY TEST ELECTRIC: CPT

## 2025-04-26 PROCEDURE — 43235 EGD DIAGNOSTIC BRUSH WASH: CPT

## 2025-04-26 PROCEDURE — 93306 TTE W/DOPPLER COMPLETE: CPT

## 2025-04-26 PROCEDURE — 87640 STAPH A DNA AMP PROBE: CPT

## 2025-04-26 PROCEDURE — 80053 COMPREHEN METABOLIC PANEL: CPT

## 2025-04-26 PROCEDURE — 99223 1ST HOSP IP/OBS HIGH 75: CPT | Mod: 25

## 2025-04-26 PROCEDURE — 85379 FIBRIN DEGRADATION QUANT: CPT

## 2025-04-26 PROCEDURE — 82140 ASSAY OF AMMONIA: CPT

## 2025-04-26 PROCEDURE — 83605 ASSAY OF LACTIC ACID: CPT

## 2025-04-26 PROCEDURE — 86985 SPLIT BLOOD OR PRODUCTS: CPT

## 2025-04-26 PROCEDURE — 97163 PT EVAL HIGH COMPLEX 45 MIN: CPT | Mod: GP

## 2025-04-26 PROCEDURE — P9016: CPT

## 2025-04-26 PROCEDURE — P9037: CPT

## 2025-04-26 PROCEDURE — 97116 GAIT TRAINING THERAPY: CPT | Mod: GP

## 2025-04-26 RX ORDER — CEFTRIAXONE 500 MG/1
1000 INJECTION, POWDER, FOR SOLUTION INTRAMUSCULAR; INTRAVENOUS EVERY 24 HOURS
Refills: 0 | Status: DISCONTINUED | OUTPATIENT
Start: 2025-04-26 | End: 2025-04-27

## 2025-04-26 RX ORDER — LACTULOSE 10 G/15ML
20 SOLUTION ORAL EVERY 8 HOURS
Refills: 0 | Status: DISCONTINUED | OUTPATIENT
Start: 2025-04-26 | End: 2025-04-26

## 2025-04-26 RX ORDER — TRANEXAMIC ACID 1000 MG/10
1000 AMPUL (ML) INTRAVENOUS ONCE
Refills: 0 | Status: COMPLETED | OUTPATIENT
Start: 2025-04-26 | End: 2025-04-26

## 2025-04-26 RX ORDER — SODIUM PHOSPHATE,DIBASIC DIHYD
15 POWDER (GRAM) MISCELLANEOUS ONCE
Refills: 0 | Status: COMPLETED | OUTPATIENT
Start: 2025-04-26 | End: 2025-04-26

## 2025-04-26 RX ORDER — OCTREOTIDE ACETATE 500 UG/ML
50 INJECTION, SOLUTION INTRAVENOUS; SUBCUTANEOUS
Qty: 500 | Refills: 0 | Status: DISCONTINUED | OUTPATIENT
Start: 2025-04-26 | End: 2025-04-26

## 2025-04-26 RX ORDER — OCTREOTIDE ACETATE 500 UG/ML
50 INJECTION, SOLUTION INTRAVENOUS; SUBCUTANEOUS ONCE
Refills: 0 | Status: COMPLETED | OUTPATIENT
Start: 2025-04-26 | End: 2025-04-26

## 2025-04-26 RX ADMIN — Medication 1000 MILLILITER(S): at 12:01

## 2025-04-26 RX ADMIN — OCTREOTIDE ACETATE 10 MICROGRAM(S)/HR: 500 INJECTION, SOLUTION INTRAVENOUS; SUBCUTANEOUS at 16:37

## 2025-04-26 RX ADMIN — Medication 80 MILLIGRAM(S): at 12:01

## 2025-04-26 RX ADMIN — Medication 200 MILLIGRAM(S): at 16:38

## 2025-04-26 RX ADMIN — Medication 10 MG/HR: at 12:01

## 2025-04-26 RX ADMIN — Medication 500000 UNIT(S): at 17:51

## 2025-04-26 RX ADMIN — Medication 500000 UNIT(S): at 23:41

## 2025-04-26 RX ADMIN — OCTREOTIDE ACETATE 50 MICROGRAM(S): 500 INJECTION, SOLUTION INTRAVENOUS; SUBCUTANEOUS at 13:58

## 2025-04-26 RX ADMIN — CEFTRIAXONE 1000 MILLIGRAM(S): 500 INJECTION, POWDER, FOR SOLUTION INTRAMUSCULAR; INTRAVENOUS at 13:58

## 2025-04-26 NOTE — ED ADULT NURSE NOTE - OBJECTIVE STATEMENT
Pt presents to ER dizziness/weakness, vomiting blood and rectal bleeding. Onset of symptoms began yesterday. Pt is no longer on blood thinners. Pt has history of rectal bleeding. AO x 3 oriented to baseline, normal breathing pattern with no difficulty.

## 2025-04-26 NOTE — ED PROVIDER NOTE - PROGRESS NOTE DETAILS
Cristobal Sorensen for attending Dr. Johnson   Spoke with Dr. Weathers, aware of pt, will keep him updated on plan of care.

## 2025-04-26 NOTE — H&P ADULT - NSHPADDITIONALINFOADULT_GEN_ALL_CORE
Patient seen   Patient with hx. of cirrhosis, ? from agent orange  hepatic encephalopathy on lactulose and rifaximin  recurrent asictes  hx. Tavr,   afib not on ac    patient had banding of varices earlier this month  presents with weakness, hematemesis and bloody stools  hgb 5.4  Rachell recurrent variceal bleeding  transfuse  INR 1.4  Octreotide, Protonix  scope by GI later today  rocephin  at high risk of Hepatic encephalopathy

## 2025-04-26 NOTE — ED ADULT NURSE NOTE - CHIEF COMPLAINT QUOTE
vomiting blood and melena since last night. similar episode 2 weeks ago. Hypotensive PTA. Received 250cc fluids via 22g IV.

## 2025-04-26 NOTE — H&P ADULT - NSICDXPASTMEDICALHX_GEN_ALL_CORE_FT
PAST MEDICAL HISTORY:  Hepatic encephalopathy     HTN (hypertension)     Liver cirrhosis     Liver cirrhosis

## 2025-04-26 NOTE — PROGRESS NOTE ADULT - ASSESSMENT
78yo male with PMHx of recent diagnosis of A-fib (not on AC), HTN, liver cirrhosis (dx March 2022 from agent orange?) hepatic encephalopathy, hernia x3, recent admitted April 3rd- 9th, 2025 for UGIB 2/2 esophageal varies s/p banding and COVID now admitted for    # ABLA  # Hypotension  # GIB  # BRENNAN on CKD    Neuro:  - Avoid Neuro Deliriogenic / sedative medications  - Rifaximin    CV:  - Maintain MAP > 65, ow threshold to start vasopressors    Pulm:  - Supplemental oxygen as needed to maintain spo2 > 94%  - Incentive spirometry                  GI:  - PPI  - CLD per GI  - EGD negative , plan for colonoscopy on Monday    Renal:  - Continue to monitor Bun/Cr and UOP  - Replacing electrolytes as needed with Goal K> 4, PO> 3, Mg> 2               - Strict I&O's  - Avoid Nephro toxic medication  - Renally dose meds    Heme:  - SCDs for DVT/PE ppx   - s/p 3 PRBC, H/H 8.2/25.5, in setting of Hypotension and bloody BM will give additional PRBC/FFP/Plt    ID:  - Microbiology and Radiology reviewed   - trend CBC with diff, CMP  and fever curve  - CTX, Nystatin    Endo:  - Glycemic control to limit FS glucose to < 180mg/dl.    Critical Care Time (EXCLUSIVE of any non bundled procedures) :  35 minutes were spent assessing the patient's presenting problems of acute illness that pose a high probability of life threatening  deterioration or end organ damage / dysfunction.  Medical desicion making includes initiation / continuation of plan or care review data/ labwork/ radiographic study, direct patient care bedside ,  discussions with  consultants regarding care,  evaluation and interpretation of vital signs, any necessary ventilator management,   NIV or BIPAP changes  or initiation,    discussions with multidisipliary team,  am or pm rounds, discussions of goals of care with patient and family all non-inclusive of procedures.    Date of entry of this note is equal to the date of services rendered

## 2025-04-26 NOTE — CONSULT NOTE ADULT - SUBJECTIVE AND OBJECTIVE BOX
Patient is a 79y old  Male who presents with a chief complaint of GI bleed (26 Apr 2025 12:27)      HPI:  HPI: 78yo male with PMHx of recent diagnosis of A-fib (not on AC), HTN, liver cirrhosis (dx March 2022 from agent orange?) hepatic encephalopathy, hernia x3, recent admitted April 3rd- 9th, 2025 for UGIB 2/2 esophageal varies s/p banding and COVID, now brought in by EMS to the ED with daughter c/o "vomiting blood" (last episode was 7:00-8:00am this morning) and rectal bleeding since last night after eating pepperoni during dinner. Daughter states that patient started vomiting and having maroon stools around 4am this morning, multiple episodes.     In ED /70s, HR 90s. pt had dark red BM in ED and feeling nauseous but no episodes vomiting. states he feels lightheaded with some abd pain in LLQ. appears pale  Daughter meeta at bedside provided history.  (26 Apr 2025 12:27)  Last egd 4/4 with banding of esophageal varix    PAST MEDICAL & SURGICAL HISTORY:  HTN (hypertension)      Hepatic encephalopathy      Liver cirrhosis      Liver cirrhosis          MEDICATIONS  (STANDING):  cefTRIAXone Injectable. 1000 milliGRAM(s) IV Push every 24 hours  chlorhexidine 2% Cloths 1 Application(s) Topical <User Schedule>  lactulose Syrup 20 Gram(s) Oral every 8 hours  octreotide  Infusion 50 MICROgram(s)/Hr (10 mL/Hr) IV Continuous <Continuous>  pantoprazole Infusion 8 mG/Hr (10 mL/Hr) IV Continuous <Continuous>  rifAXIMin 550 milliGRAM(s) Oral two times a day  tranexamic acid IVPB 1000 milliGRAM(s) IV Intermittent once    MEDICATIONS  (PRN):      Allergies    Mushrooms (Anaphylaxis (Severe))  penicillin (Rash)    Intolerances        SOCIAL HISTORY:  as above  FAMILY HISTORY:  FH: HTN (hypertension)        REVIEW OF SYSTEMS:    CONSTITUTIONAL: No weakness, fevers or chills  EYES/ENT: No visual changes;  No vertigo or throat pain   NECK: No pain or stiffness  RESPIRATORY: No cough, wheezing, hemoptysis; No shortness of breath  CARDIOVASCULAR: No chest pain or palpitations  GASTROINTESTINAL: as above  GENITOURINARY: No dysuria, frequency or hematuria  NEUROLOGICAL: No numbness or weakness  SKIN: No itching, burning, rashes, or lesions   PSYCH: Normal mood and affect  All other review of systems is negative unless indicated above.    Vital Signs Last 24 Hrs  T(C): 36 (26 Apr 2025 15:29), Max: 36.8 (26 Apr 2025 12:15)  T(F): 96.8 (26 Apr 2025 15:29), Max: 98.3 (26 Apr 2025 12:15)  HR: 72 (26 Apr 2025 15:00) (68 - 84)  BP: 116/61 (26 Apr 2025 15:00) (87/38 - 121/43)  BP(mean): 72 (26 Apr 2025 15:00) (65 - 72)  RR: 13 (26 Apr 2025 15:00) (13 - 31)  SpO2: 100% (26 Apr 2025 14:15) (100% - 100%)    Parameters below as of 26 Apr 2025 14:15  Patient On (Oxygen Delivery Method): room air      PHYSICAL EXAM:  Constitutional: NAD  HEENT: MMM  Neck: No LAD  Respiratory: CTA  Cardiovascular: S1 and S2  Gastrointestinal: BS+, soft, NT/ND  Vascular: 2+ peripheral pulses  Neurological: A/O x 3, no focal deficits      LABS:                        5.4    6.88  )-----------( 99       ( 26 Apr 2025 11:04 )             17.6     04-26    136  |  104  |  45[H]  ----------------------------<  146[H]  5.0   |  23  |  1.97[H]    Ca    9.2      26 Apr 2025 11:04    TPro  5.6[L]  /  Alb  2.6[L]  /  TBili  1.6[H]  /  DBili  x   /  AST  37  /  ALT  18  /  AlkPhos  109  04-26    PT/INR - ( 26 Apr 2025 11:04 )   PT: 16.0 sec;   INR: 1.40 ratio         PTT - ( 26 Apr 2025 11:04 )  PTT:37.7 sec  LIVER FUNCTIONS - ( 26 Apr 2025 11:04 )  Alb: 2.6 g/dL / Pro: 5.6 gm/dL / ALK PHOS: 109 U/L / ALT: 18 U/L / AST: 37 U/L / GGT: x             RADIOLOGY & ADDITIONAL STUDIES:

## 2025-04-26 NOTE — ED PROVIDER NOTE - CONSTITUTIONAL, MLM
normal... Pale appearing, awake, alert, oriented to person, place, time/situation and in no apparent distress. Pale appearing, awake, alert, oriented to person, place, time/situation and in mild apparent distress.

## 2025-04-26 NOTE — ED PROVIDER NOTE - GASTROINTESTINAL, MLM
Abdomen mildly distended but otherwise soft, non-tender, no guarding. Abdomen mildly distended; diffuse tenderness; no rebound; no guarding; dried blood to rectum- no active bleeding on my exam in ED

## 2025-04-26 NOTE — ED PROVIDER NOTE - OBJECTIVE STATEMENT
79 year old male with PMHx of recent diagnosis of A-fib (not on AC), HTN, hepatic encephalopathy, abdominal telangiectasias, cirrhosis, hernia x3 and rectal bleeding brought in brought in by EMS to the ED with daughter c/o hematemesis since 11:00pm (last episode was 7:00-8:00am this morning) and rectal bleeding since 1:00am. Pt reports eating pepperoni during dinner last night. Per EMS pt was hypotensive to 80 cytosolic before improving to 112 cytosolic after being given 250cc of fluids via 22g IV. Pt is currently endorsing dizziness and weakness. Pt had a similar episode several weeks ago and was admitted to  (4/3-4/9) for upper GI bleed, and ascites while also being recently covid positive. Pt has had colonoscopies and endoscopies in the past. GI is Dr. Posada with [facility]. 79 year old male with PMHx of recent diagnosis of A-fib (not on AC), HTN, hepatic encephalopathy, abdominal telangiectasias, cirrhosis, hernia x3 and rectal bleeding brought in brought in by EMS to the ED with daughter c/o hematemesis (last episode was 7:00-8:00am this morning) and rectal bleeding since last night and eating pepperoni during dinner. Per EMS pt was hypotensive to 80 cytosolic before improving to 112 cytosolic after being given 250cc of fluids via 22g IV. Pt is currently endorsing dizziness and weakness. Pt had a similar episode several weeks ago and was admitted to  (4/3-4/9) for upper GI bleed, and ascites while also being recently covid positive. Pt has had colonoscopies and endoscopies in the past. GI is Dr. Posada with [facility]. 79 year old male with PMHx of recent diagnosis of A-fib (not on AC), HTN, hepatic encephalopathy, abdominal telangiectasias, cirrhosis, hernia x3 and rectal bleeding brought in brought in by EMS to the ED with daughter c/o "vomiting blood" (last episode was 7:00-8:00am this morning) and rectal bleeding since last night after eating pepperoni during dinner. Per EMS pt was hypotensive to 80 systolic before improving to SBP:112 upon arrival after being given 250cc of fluids. Pt is currently endorsing dizziness and weakness. Pt had a similar episode several weeks ago and was admitted to  (4/3-4/9) for upper GI bleed, and ascites while also being recently covid positive; GI is Dr. Posada with [facility].

## 2025-04-26 NOTE — PATIENT PROFILE ADULT - FALL HARM RISK - HARM RISK INTERVENTIONS
Assistance with ambulation/Assistance OOB with selected safe patient handling equipment/Communicate Risk of Fall with Harm to all staff/Discuss with provider need for PT consult/Monitor gait and stability/Reinforce activity limits and safety measures with patient and family/Sit up slowly, dangle for a short time, stand at bedside before walking/Tailored Fall Risk Interventions/Visual Cue: Yellow wristband and red socks/Bed in lowest position, wheels locked, appropriate side rails in place/Call bell, personal items and telephone in reach/Instruct patient to call for assistance before getting out of bed or chair/Non-slip footwear when patient is out of bed/[place] to call system/Physically safe environment - no spills, clutter or unnecessary equipment/Purposeful Proactive Rounding/Room/bathroom lighting operational, light cord in reach

## 2025-04-26 NOTE — ED ADULT NURSE NOTE - NSFALLHARMRISKINTERV_ED_ALL_ED
Assistance OOB with selected safe patient handling equipment if applicable/Assistance with ambulation/Communicate risk of Fall with Harm to all staff, patient, and family/Encourage patient to sit up slowly, dangle for a short time, stand at bedside before walking/Monitor gait and stability/Orthostatic vital signs/Provide visual cue: red socks, yellow wristband, yellow gown, etc/Reinforce activity limits and safety measures with patient and family/Bed in lowest position, wheels locked, appropriate side rails in place/Call bell, personal items and telephone in reach/Instruct patient to call for assistance before getting out of bed/chair/stretcher/Non-slip footwear applied when patient is off stretcher/[place] to call system/Physically safe environment - no spills, clutter or unnecessary equipment/Purposeful Proactive Rounding/Room/bathroom lighting operational, light cord in reach

## 2025-04-26 NOTE — H&P ADULT - ASSESSMENT
ASSESSMENT  78yo male with PMHx of recent diagnosis of A-fib (not on AC), s/p PPM, AS, s/p TAVR, HTN, liver cirrhosis (dx March 2022 from agent orange?) hepatic encephalopathy, hernia x3, recent admitted April 3rd- 9th, 2025 for UGIB 2/2 esophageal varies s/p banding and COVID, now brought in by EMS to the ED with daughter c/o "vomiting blood" (last episode was 7:00-8:00am this morning) and rectal bleeding since last night after eating pepperoni during dinner. Daughter states that patient started vomiting and having maroon stools around 4am this morning, multiple episodes.     Admitted for   1. Coffee ground emesis, maroon stools  2. Suspect UGIB  3. Acute blood loss anemia  4. BRENNAN on CKD    PLAN    Neuro: AAOx 3, frail and weak appearing  CV: BP okay for now 110/60, in sinus rhythm paced   Pulm: on room air.  GI: NPO. IV protonix gtt, start IV octreotide for hx of esophageal varices. CTAP with no active bleed. GI consulted - Dr. Weathers. EGD this afternoon ~3pm  Nephro: BRENNAN on CKD likely from hypoperfusion. monitor I & Os. Trend renal fxn  ID: IV CTX 1gm for SBP ppx. moderate ascites on imaging may need paracentesis after stabilization of GI bleed. trend WBC. monitor temps. f/u blood  cultures.   Heme: Hgb 5.4, ordered for 2u pRBC STAT. Trend H/H. keep active T & S. no chemical DVT ppx due to GI bleed. SCDs    Dispo: CCU. transfuse pRBC, protonix gtt, octreotide. CTX for SBP ppx. EGD today.    Discussed with Dr. Love, d/w Dr. Weathers    Daughter Jennifer updated at bedside

## 2025-04-26 NOTE — CHART NOTE - NSCHARTNOTEFT_GEN_A_CORE
EGD neg for active bleeding   Hb 5.4-->7.4, appears pale, wide pulse pressure, will give 1 PRBC (baseline~8)  D/c drips and start clears as d/w GI   Likely colonoscopy on Monday based on clinical course   Repeat labs at 2100

## 2025-04-26 NOTE — H&P ADULT - NSHPLABSRESULTS_GEN_ALL_CORE
Vital Signs Last 24 Hrs  T(C): 36.8 (26 Apr 2025 12:15), Max: 36.8 (26 Apr 2025 12:15)  T(F): 98.3 (26 Apr 2025 12:15), Max: 98.3 (26 Apr 2025 12:15)  HR: 78 (26 Apr 2025 12:15) (68 - 78)  BP: 92/54 (26 Apr 2025 12:15) (92/54 - 115/89)  BP(mean): --  RR: 16 (26 Apr 2025 12:15) (16 - 16)  SpO2: 100% (26 Apr 2025 12:15) (100% - 100%)    Parameters below as of 26 Apr 2025 12:15  Patient On (Oxygen Delivery Method): room air                            5.4    x     )-----------( x        ( 26 Apr 2025 11:04 )             17.6     04-26    136  |  104  |  45[H]  ----------------------------<  146[H]  5.0   |  23  |  1.97[H]    Ca    9.2      26 Apr 2025 11:04    TPro  5.6[L]  /  Alb  2.6[L]  /  TBili  1.6[H]  /  DBili  x   /  AST  37  /  ALT  18  /  AlkPhos  109  04-26    LIVER FUNCTIONS - ( 26 Apr 2025 11:04 )  Alb: 2.6 g/dL / Pro: 5.6 gm/dL / ALK PHOS: 109 U/L / ALT: 18 U/L / AST: 37 U/L / GGT: x           PT/INR - ( 26 Apr 2025 11:04 )   PT: 16.0 sec;   INR: 1.40 ratio      PTT - ( 26 Apr 2025 11:04 )  PTT:37.7 sec  Urinalysis Basic - ( 26 Apr 2025 11:04 )    Color: x / Appearance: x / SG: x / pH: x  Gluc: 146 mg/dL / Ketone: x  / Bili: x / Urobili: x   Blood: x / Protein: x / Nitrite: x   Leuk Esterase: x / RBC: x / WBC x   Sq Epi: x / Non Sq Epi: x / Bacteria: x

## 2025-04-26 NOTE — H&P ADULT - HISTORY OF PRESENT ILLNESS
HPI: 80yo male with PMHx of recent diagnosis of A-fib (not on AC), HTN, liver cirrhosis (dx March 2022 from agent orange?) hepatic encephalopathy, hernia x3, recent admitted April 3rd- 9th, 2025 for UGIB 2/2 esophageal varies s/p banding and COVID, now brought in by EMS to the ED with daughter c/o "vomiting blood" (last episode was 7:00-8:00am this morning) and rectal bleeding since last night after eating pepperoni during dinner. Daughter states that patient started vomiting and having maroon stools around 4am this morning, multiple episodes.     In ED /70s, HR 90s. pt had dark red BM in ED and feeling nauseous but no episodes vomiting. states he feels lightheaded with some abd pain in LLQ. appears pale  Daughter meeta at bedside provided history.

## 2025-04-26 NOTE — PROGRESS NOTE ADULT - SUBJECTIVE AND OBJECTIVE BOX
Patient is a 79y old  Male who presents with a chief complaint of GI bleed (26 Apr 2025 23:26)      Events last 24 hours: EGD negative, remains Hypotensive with Melena, PRBC FFP PLt ordered    PAST MEDICAL & SURGICAL HISTORY:  HTN (hypertension)      Hepatic encephalopathy      Liver cirrhosis      Liver cirrhosis              Medications:  cefTRIAXone Injectable. 1000 milliGRAM(s) IV Push every 24 hours  nystatin    Suspension 197033 Unit(s) Swish and Swallow four times a day  rifAXIMin 550 milliGRAM(s) Oral two times a day              pantoprazole  Injectable 40 milliGRAM(s) IV Push daily            chlorhexidine 2% Cloths 1 Application(s) Topical <User Schedule>            ICU Vital Signs Last 24 Hrs  T(C): 36.8 (26 Apr 2025 20:00), Max: 37.1 (26 Apr 2025 19:06)  T(F): 98.2 (26 Apr 2025 20:00), Max: 98.7 (26 Apr 2025 19:06)  HR: 71 (26 Apr 2025 23:00) (60 - 107)  BP: 112/51 (26 Apr 2025 23:00) (87/38 - 136/116)  BP(mean): 69 (26 Apr 2025 23:00) (57 - 125)  ABP: --  ABP(mean): --  RR: 21 (26 Apr 2025 23:00) (13 - 36)  SpO2: 100% (26 Apr 2025 23:00) (100% - 100%)    O2 Parameters below as of 26 Apr 2025 20:00  Patient On (Oxygen Delivery Method): nasal cannula                I&O's Detail    26 Apr 2025 07:01  -  26 Apr 2025 23:52  --------------------------------------------------------  IN:    Pantoprazole: 30 mL    PRBCs (Packed Red Blood Cells): 867 mL  Total IN: 897 mL    OUT:    Voided (mL): 350 mL  Total OUT: 350 mL    Total NET: 547 mL          LABS:                        8.2    6.27  )-----------( 70       ( 26 Apr 2025 21:25 )             25.5     04-26    138  |  108  |  41[H]  ----------------------------<  164[H]  4.6   |  20[L]  |  2.04[H]    Ca    9.0      26 Apr 2025 21:25  Phos  3.6     04-26  Mg     1.7     04-26    TPro  5.8[L]  /  Alb  2.7[L]  /  TBili  2.0[H]  /  DBili  x   /  AST  38[H]  /  ALT  20  /  AlkPhos  95  04-26          CAPILLARY BLOOD GLUCOSE        PT/INR - ( 26 Apr 2025 11:04 )   PT: 16.0 sec;   INR: 1.40 ratio         PTT - ( 26 Apr 2025 11:04 )  PTT:37.7 sec  Urinalysis Basic - ( 26 Apr 2025 21:25 )    Color: x / Appearance: x / SG: x / pH: x  Gluc: 164 mg/dL / Ketone: x  / Bili: x / Urobili: x   Blood: x / Protein: x / Nitrite: x   Leuk Esterase: x / RBC: x / WBC x   Sq Epi: x / Non Sq Epi: x / Bacteria: x      CULTURES:        RADIOLOGY: < from: CT Abdomen and Pelvis w/wo IV Cont (04.26.25 @ 11:44) >    ACC: [id number] EXAM:  CT ABDOMEN AND PELVIS WAW IC   ORDERED BY: KENYETTA CORREA     PROCEDURE DATE:  04/26/2025          INTERPRETATION:  CLINICAL INFORMATION: Hematemesis and rectal bleeding.  Recent upper GI bleed    ADDITIONAL CLINICAL INFORMATION: Other, Non-specified    COMPARISON: CT scan abdomen pelvis 4/3/2025    CONTRAST/COMPLICATIONS:  IV Contrast: Omnipaque 350  90 cc administered   0 cc discarded  Oral Contrast: NONE  .    PROCEDURE:  CT of the Abdomen and Pelvis was performed.  Precontrast, Arterial and Delayed phases were performed.  Sagittal and coronal reformats were performed.    FINDINGS:    LOWER CHEST:  Cardiac pacemaker leads.  TAVR.  Coronary artery calcification.  Mitral annular calcification.    Small left pleural effusion.  Focal peripheral nodular opacities left lower lobe, likely reflecting   mildly dilated mucus impacted small airways.    LIVER:  Cirrhotic morphology.  Recanalized paraumbilical vein.  BILE DUCTS: Normal caliber.  GALLBLADDER: Dependent biliary sludge and possible small gallstones,   similar to prior exam.  SPLEEN: Within normal limits.  PANCREAS: Within normal limits.  ADRENALS: Within normal limits.  KIDNEYS/URETERS:  Left renal cyst.  Additional bilateral renal hypodensities are too small for   characterization.  No hydronephrosis.    BLADDER: Within normal limits.  REPRODUCTIVE ORGANS:  The prostate is not enlarged.    BOWEL:  PERITONEUM/RETROPERITONEUM:  Mild distal esophageal wall thickening.  The stomach is nondistended, however, possible gastric wall thickening.  High attenuation intraluminal content is noted within the colon extending   to the rectum on noncontrast phase imaging; findings is suggestive of   hemorrhagic products.  No intraluminal extravasation of contrastis noted to suggest active   gastrointestinal bleeding.  No bowel obstruction.    Moderate volume of ascites.    VESSELS: Atherosclerotic changes.  The SMA and SMV are patent.  The portal vein is patent.  LYMPH NODES:  Small gastrohepatic ligament lymph nodes.  Small periportal lymph nodes.    ABDOMINAL WALL:  Left inguinal hernia containing fat and not obstructed segment of sigmoid   colon.  Fluid is noted within the hernia sac extending to the left scrotum.    BONES: No acute osseous abnormality.    IMPRESSION:    High attenuation intraluminal contents within the colon is suggestive of   hemorrhagic products.  No CT evidence for active gastrointestinal bleeding.    Mild distal esophageal wall thickening with possible gastric wall   thickening.    Cirrhosis.  Moderate volume of ascites.    Other findings as discussed above.    --- End of Report ---            SHAYNE MONTEJO MD; Attending Radiologist  This document has been electronically signed. Apr 26 2025 12:18PM    < end of copied text >

## 2025-04-26 NOTE — H&P ADULT - NSHPPHYSICALEXAM_GEN_ALL_CORE
GEN: lying in bed, appear pale  CV:  +S1, +S2, RRR, + systolic murmur  RESP:   lungs clear to auscultation bilaterally, no wheeze,   GI:  abdomen softly, mild distended. LLQ tenderness  EXT:  2+ ptting edema in LE b/l extending up to knees  NEURO:  AAOX3,   SKIN:  pale appearing

## 2025-04-26 NOTE — ED ADULT TRIAGE NOTE - TEMPERATURE IN CELSIUS (DEGREES C)
----- Message from Deo Razo MD sent at 1/20/2023  3:59 PM CST -----  Mandeep Dacosta,     This patient has just crossed into diabetic territory. Has BMI of 41. Not interested in bariatric surgery at this time. Open to discussing GLP1 agonists. Sending your way if your staff could contact him.     Best,  Last Razo MD, MPH  Interventional Cardiology  Please contact via Epic or [facility]      
Thanks Doe.   Our office will call to set up.  
[phone]

## 2025-04-26 NOTE — ED PROVIDER NOTE - CLINICAL SUMMARY MEDICAL DECISION MAKING FREE TEXT BOX
79 year old male with rectal bleeding and hematemesis. Plan for cardiac monitor, EKG, CXR, IV fluids, labs, CT abdomen/pelvis, GI consult and reevaluate. 79 year old male with rectal bleeding and hematemesis. Plan for cardiac monitor, EKG, CXR, IV fluids, labs, CT abdomen/pelvis, GI consult and reevaluate.    Elizabeth MCDANIEL: Dr. Weathers aware of anemia- patient's daughter consented for PRBCs; admit to CCU- ANNAMARIA Bourgeois at bedside; endorsed to CCU team- under Dr. Reyes.

## 2025-04-26 NOTE — ED ADULT TRIAGE NOTE - CHIEF COMPLAINT QUOTE
TC to Jordyn Forth left VM that the order she was requesting was placed. vomiting blood and melena since last night. similar episode 2 weeks ago. Hypotensive PTA. Received 250cc fluids via 22g IV.

## 2025-04-26 NOTE — ED PROVIDER NOTE - NSTIMEPROVIDERCAREINITIATE_GEN_ER
26-Apr-2025 10:40 Benzoyl Peroxide Pregnancy And Lactation Text: This medication is Pregnancy Category C. It is unknown if benzoyl peroxide is excreted in breast milk.

## 2025-04-27 LAB
ALBUMIN SERPL ELPH-MCNC: 2.9 G/DL — LOW (ref 3.3–5)
ALP SERPL-CCNC: 87 U/L — SIGNIFICANT CHANGE UP (ref 40–120)
ALT FLD-CCNC: 20 U/L — SIGNIFICANT CHANGE UP (ref 12–78)
AMMONIA BLD-MCNC: 63 UMOL/L — HIGH (ref 11–32)
ANION GAP SERPL CALC-SCNC: 9 MMOL/L — SIGNIFICANT CHANGE UP (ref 5–17)
AST SERPL-CCNC: 38 U/L — HIGH (ref 15–37)
BILIRUB SERPL-MCNC: 2.4 MG/DL — HIGH (ref 0.2–1.2)
BUN SERPL-MCNC: 38 MG/DL — HIGH (ref 7–23)
CALCIUM SERPL-MCNC: 9 MG/DL — SIGNIFICANT CHANGE UP (ref 8.5–10.1)
CHLORIDE SERPL-SCNC: 107 MMOL/L — SIGNIFICANT CHANGE UP (ref 96–108)
CO2 SERPL-SCNC: 21 MMOL/L — LOW (ref 22–31)
CREAT SERPL-MCNC: 1.7 MG/DL — HIGH (ref 0.5–1.3)
EGFR: 40 ML/MIN/1.73M2 — LOW
EGFR: 40 ML/MIN/1.73M2 — LOW
GLUCOSE SERPL-MCNC: 114 MG/DL — HIGH (ref 70–99)
HCT VFR BLD CALC: 25.3 % — LOW (ref 39–50)
HGB BLD-MCNC: 8.3 G/DL — LOW (ref 13–17)
IMMATURE PLATELET FRACTION #: 2.4 K/UL — LOW (ref 3.9–12.5)
IMMATURE PLATELET FRACTION %: 3.9 % — SIGNIFICANT CHANGE UP (ref 1.6–7.1)
INR BLD: 1.15 RATIO — SIGNIFICANT CHANGE UP (ref 0.85–1.16)
MAGNESIUM SERPL-MCNC: 1.8 MG/DL — SIGNIFICANT CHANGE UP (ref 1.6–2.6)
MCHC RBC-ENTMCNC: 30.6 PG — SIGNIFICANT CHANGE UP (ref 27–34)
MCHC RBC-ENTMCNC: 32.8 G/DL — SIGNIFICANT CHANGE UP (ref 32–36)
MCV RBC AUTO: 93.4 FL — SIGNIFICANT CHANGE UP (ref 80–100)
MRSA PCR RESULT.: SIGNIFICANT CHANGE UP
NRBC # BLD AUTO: 0 K/UL — SIGNIFICANT CHANGE UP (ref 0–0)
NRBC # FLD: 0 K/UL — SIGNIFICANT CHANGE UP (ref 0–0)
NRBC BLD AUTO-RTO: 0 /100 WBCS — SIGNIFICANT CHANGE UP (ref 0–0)
PHOSPHATE SERPL-MCNC: 3.8 MG/DL — SIGNIFICANT CHANGE UP (ref 2.5–4.5)
PLATELET # BLD AUTO: 61 K/UL — LOW (ref 150–400)
PMV BLD: 11.7 FL — SIGNIFICANT CHANGE UP (ref 7–13)
POTASSIUM SERPL-MCNC: 4.1 MMOL/L — SIGNIFICANT CHANGE UP (ref 3.5–5.3)
POTASSIUM SERPL-SCNC: 4.1 MMOL/L — SIGNIFICANT CHANGE UP (ref 3.5–5.3)
PROT SERPL-MCNC: 6 GM/DL — SIGNIFICANT CHANGE UP (ref 6–8.3)
PROTHROM AB SERPL-ACNC: 13.6 SEC — HIGH (ref 9.9–13.4)
RBC # BLD: 2.71 M/UL — LOW (ref 4.2–5.8)
RBC # FLD: 23 % — HIGH (ref 10.3–14.5)
S AUREUS DNA NOSE QL NAA+PROBE: SIGNIFICANT CHANGE UP
SODIUM SERPL-SCNC: 137 MMOL/L — SIGNIFICANT CHANGE UP (ref 135–145)
TROPONIN I, HIGH SENSITIVITY RESULT: 44.91 NG/L — SIGNIFICANT CHANGE UP
WBC # BLD: 6.04 K/UL — SIGNIFICANT CHANGE UP (ref 3.8–10.5)
WBC # FLD AUTO: 6.04 K/UL — SIGNIFICANT CHANGE UP (ref 3.8–10.5)

## 2025-04-27 PROCEDURE — 99233 SBSQ HOSP IP/OBS HIGH 50: CPT

## 2025-04-27 PROCEDURE — 93010 ELECTROCARDIOGRAM REPORT: CPT

## 2025-04-27 RX ORDER — EMPAGLIFLOZIN 25 MG/1
1 TABLET, FILM COATED ORAL
Refills: 0 | DISCHARGE

## 2025-04-27 RX ORDER — ONDANSETRON HCL/PF 4 MG/2 ML
0 VIAL (ML) INJECTION
Refills: 0 | DISCHARGE

## 2025-04-27 RX ORDER — CYST/ALA/Q10/PHOS.SER/DHA/BROC 100-20-50
1 POWDER (GRAM) ORAL
Refills: 0 | DISCHARGE

## 2025-04-27 RX ORDER — ALBUTEROL SULFATE 2.5 MG/3ML
2 VIAL, NEBULIZER (ML) INHALATION
Refills: 0 | DISCHARGE

## 2025-04-27 RX ORDER — POLYETHYLENE GLYCOL-3350 AND ELECTROLYTES 236; 6.74; 5.86; 2.97; 22.74 G/274.31G; G/274.31G; G/274.31G; G/274.31G; G/274.31G
2000 POWDER, FOR SOLUTION ORAL ONCE
Refills: 0 | Status: COMPLETED | OUTPATIENT
Start: 2025-04-27 | End: 2025-04-27

## 2025-04-27 RX ORDER — SPIRONOLACTONE 25 MG
1 TABLET ORAL
Refills: 0 | DISCHARGE

## 2025-04-27 RX ORDER — ACETAMINOPHEN 500 MG/5ML
2 LIQUID (ML) ORAL
Refills: 0 | DISCHARGE

## 2025-04-27 RX ORDER — CEFTRIAXONE 500 MG/1
1000 INJECTION, POWDER, FOR SOLUTION INTRAMUSCULAR; INTRAVENOUS EVERY 24 HOURS
Refills: 0 | Status: DISCONTINUED | OUTPATIENT
Start: 2025-04-27 | End: 2025-04-29

## 2025-04-27 RX ORDER — MAGNESIUM OXIDE 400 MG
1 TABLET ORAL
Refills: 0 | DISCHARGE

## 2025-04-27 RX ORDER — CEFTRIAXONE 500 MG/1
1000 INJECTION, POWDER, FOR SOLUTION INTRAMUSCULAR; INTRAVENOUS EVERY 24 HOURS
Refills: 0 | Status: DISCONTINUED | OUTPATIENT
Start: 2025-04-27 | End: 2025-04-27

## 2025-04-27 RX ORDER — CYANOCOBALAMIN 1000 UG/ML
1 INJECTION INTRAMUSCULAR; SUBCUTANEOUS
Refills: 0 | DISCHARGE

## 2025-04-27 RX ORDER — FUROSEMIDE 10 MG/ML
1 INJECTION INTRAMUSCULAR; INTRAVENOUS
Refills: 0 | DISCHARGE

## 2025-04-27 RX ADMIN — CEFTRIAXONE 1000 MILLIGRAM(S): 500 INJECTION, POWDER, FOR SOLUTION INTRAMUSCULAR; INTRAVENOUS at 12:42

## 2025-04-27 RX ADMIN — Medication 500000 UNIT(S): at 05:22

## 2025-04-27 RX ADMIN — POLYETHYLENE GLYCOL-3350 AND ELECTROLYTES 2000 MILLILITER(S): 236; 6.74; 5.86; 2.97; 22.74 POWDER, FOR SOLUTION ORAL at 16:14

## 2025-04-27 RX ADMIN — Medication 500000 UNIT(S): at 23:06

## 2025-04-27 RX ADMIN — Medication 40 MILLIGRAM(S): at 09:55

## 2025-04-27 RX ADMIN — Medication 1 APPLICATION(S): at 05:43

## 2025-04-27 RX ADMIN — Medication 500000 UNIT(S): at 12:42

## 2025-04-27 NOTE — PROGRESS NOTE ADULT - ASSESSMENT
ASSESSMENT  78yo male with PMHx of recent diagnosis of A-fib (not on AC), s/p PPM, AS, s/p TAVR, HTN, liver cirrhosis (dx March 2022 from agent orange?) hepatic encephalopathy, hernia x3, recent admitted April 3rd- 9th, 2025 for UGIB 2/2 esophageal varies s/p banding and COVID, now brought in by EMS to the ED with daughter c/o "vomiting blood" (last episode was 7:00-8:00am this morning) and rectal bleeding since last night after eating pepperoni during dinner. Daughter states that patient started vomiting and having maroon stools around 4am this morning, multiple episodes.     Admitted for   1. Coffee ground emesis, maroon stools  2. Suspect UGIB  3. Acute blood loss anemia  4. BRENNAN on CKD    PLAN    Neuro:  AAOx 3, frail and weak appearing  CV:  BP okay for now 110/60, in sinus rhythm paced , check qt was prolonged on previous EKG  Pulm: on room air.  GI:  clear liquids, no active bleeding on EGD,  esophagitis CTAP with no active bleed.  but blood contexts in cecum        possible colonscopy, hx. of hepatic encephalopathy  Nephro:  BRENNAN on CKD likely from hypoperfusion. creatinine 1.7 improving  ID:  moderate ascites on imaging  was tapped for few weeks ago, rocephine  Heme: Hgb 5.4, ordered for 3u pRBC STAT.  no chemical DVT ppx due to GI bleed. SCDs

## 2025-04-27 NOTE — PROGRESS NOTE ADULT - ASSESSMENT
Imp:  Cirrhosis  GI bleed, but neg EGD    Rec:  Colonoscopy tomorrow. Risks/benefits reviewed with patient and son who agree  Cont ceftriaxone prophylaxis

## 2025-04-27 NOTE — PHARMACOTHERAPY INTERVENTION NOTE - COMMENTS
Medication reconciliation complete.  Home medications reviewed with patient and his son and daughter at bedside and confirmed against Dr. [facility]. Patient reports allergies to penicillin (rash) and mushrooms (anaphylaxis).    Home Medications:  Advair Diskus 500 mcg-50 mcg/inh inhalation powder: 1 puff(s) inhaled 2 times a day (27 Apr 2025 12:47)  ascorbic acid 500 mg oral tablet: 1 tab(s) orally once a day (in the afternoon) (27 Apr 2025 12:47)  carvedilol 3.125 mg oral tablet: 1 tab(s) orally 2 times a day (27 Apr 2025 12:47)  Cipro 500 mg oral tablet: 1 tab(s) orally 2 times a day (started Monday 4/21/25) (27 Apr 2025 12:46)  empagliflozin 25 mg oral tablet: 1 tab(s) orally once a day (in the morning) (27 Apr 2025 12:47)  folic acid 1 mg oral tablet: 1 tab(s) orally once a day (in the afternoon) (27 Apr 2025 12:47)  furosemide 40 mg oral tablet: 1 tab(s) orally once a day in the morning (27 Apr 2025 12:47)  lactulose 10 g/15 mL oral and rectal liquid: 30 milliliter(s) orally once a day (27 Apr 2025 12:48)  loratadine 10 mg oral tablet: 1 tab(s) orally once a day (at bedtime) (27 Apr 2025 12:47)  magnesium oxide 400 mg oral tablet: 1 tab(s) orally once a day (at bedtime) (27 Apr 2025 12:47)  montelukast 10 mg oral tablet: 1 tab(s) orally once a day (at bedtime) (27 Apr 2025 12:47)  Multiple Vitamins with Minerals oral tablet, chewable: 1 tab(s) chewed once a day (in the morning) (27 Apr 2025 12:47)  ondansetron: prn as needed for  nausea (27 Apr 2025 12:47)  pantoprazole 40 mg oral delayed release tablet: 1 tab(s) orally 2 times a day (27 Apr 2025 12:47)  rifAXIMin 550 mg oral tablet: 1 tab(s) orally 2 times a day (27 Apr 2025 12:47)  spironolactone 50 mg oral tablet: 1 tab(s) orally once a day in the morning (27 Apr 2025 12:47)  Tylenol Extra Strength 500 mg oral tablet: 2 tab(s) orally 1 to 2 times a day as needed for  mild pain (27 Apr 2025 12:47)  Ventolin HFA 90 mcg/inh inhalation aerosol: 2 puff(s) inhaled every 4 to 6 hours as needed for  shortness of breath and/or wheezing (27 Apr 2025 12:47)  Vitamin B-12 1000 mcg oral tablet: 1 tab(s) orally once a day (27 Apr 2025 12:47)  Vitamin D3 1250 mcg (50,000 intl units) oral capsule: 1 cap(s) orally once a week on Sundays (27 Apr 2025 12:47)

## 2025-04-27 NOTE — PROGRESS NOTE ADULT - SUBJECTIVE AND OBJECTIVE BOX
Interval History:        Patient admitted with hgb of 5.4 received 4 units of pRBC, 8.3.         one maroon bowel movement overnght.        had endoscopy yesterday no bleeding, only candida esophagitis        had brown bowel movement this am          HPI:   78yo male with PMHx of    A-fib (not on AC), HTN, liver cirrhosis (dx March 2022 from agent orange?) hepatic encephalopathy, TAVR,  hernia x3, recent admitted April 3rd- 9th, 2025 for UGIB 2/2 esophageal varies s/p banding and COVID, now brought in by EMS to the ED with daughter c/o "vomiting blood"  and rectal bleeding   Hgb was 5.3 in ED.  In ED /70s, HR 90s. pt had dark red BM in ED and feeling nauseous but no episodes vomiting. states he feels lightheaded with some abd pain in LLQ.  CT showed blood in cecum, no active extravasation  had episode of wide complex rhythm yesterday, qt was prolonged    Review of Systems: no chest pain, no shortness of breathe, no fever no abdominal pain feeling better    MEDICATIONS  (STANDING):  chlorhexidine 2% Cloths 1 Application(s) Topical <User Schedule>  nystatin    Suspension 073836 Unit(s) Swish and Swallow four times a day  pantoprazole  Injectable 40 milliGRAM(s) IV Push daily  rifAXIMin 550 milliGRAM(s) Oral two times a day    Height (cm): 172.7 (04-26 @ 10:38)    ICU Vital Signs Last 24 Hrs  T(C): 36.9 (27 Apr 2025 03:45), Max: 37.3 (26 Apr 2025 23:58)  T(F): 98.4 (27 Apr 2025 03:45), Max: 99.2 (26 Apr 2025 23:58)  HR: 66 (27 Apr 2025 08:00) (60 - 107)  BP: 116/54 (27 Apr 2025 08:00) (87/38 - 136/116)  BP(mean): 71 (27 Apr 2025 08:00) (57 - 125)  ABP: --  ABP(mean): --  RR: 20 (27 Apr 2025 08:00) (13 - 36)  SpO2: 100% (27 Apr 2025 08:00) (97% - 100%)    O2 Parameters below as of 27 Apr 2025 00:45  Patient On (Oxygen Delivery Method): nasal cannula    Physical Exam    General - no distress  HEENT sclera anicteric  Neck supple  lungs clear  cv rrr  abdomen distended , firm, no tenderness, no guarding, no rebound  neuro awake, alert, appropriate  Extremity warm      I&O's Summary    26 Apr 2025 07:01  -  27 Apr 2025 07:00  --------------------------------------------------------  IN: 1701 mL / OUT: 750 mL / NET: 951 mL                        8.3    6.04  )-----------( 61       ( 27 Apr 2025 05:23 )             25.3       04-27    137  |  107  |  38[H]  ----------------------------<  114[H]  4.1   |  21[L]  |  1.70[H]    Ca    9.0      27 Apr 2025 05:23  Phos  3.8     04-27  Mg     1.8     04-27    TPro  6.0  /  Alb  2.9[L]  /  TBili  2.4[H]  /  DBili  x   /  AST  38[H]  /  ALT  20  /  AlkPhos  87  04-27    Urinalysis Basic - ( 27 Apr 2025 05:23 )    Color: x / Appearance: x / SG: x / pH: x  Gluc: 114 mg/dL / Ketone: x  / Bili: x / Urobili: x   Blood: x / Protein: x / Nitrite: x   Leuk Esterase: x / RBC: x / WBC x   Sq Epi: x / Non Sq Epi: x / Bacteria: x    DVT Prophylaxis: venodynes                                                                Advanced Directives: F[facility]ll [facility]     Sepsis Criteria Met - no    Labs, Notes, Orders, radiologic studies reviewed and care coordinated with multidisciplinary team

## 2025-04-27 NOTE — ANESTHESIA FOLLOW-UP NOTE - NSEVALATIONFT_GEN_ALL_CORE
Vital Signs Last 24 Hrs  T(C): 36.2 (27 Apr 2025 08:21), Max: 37.3 (26 Apr 2025 23:58)  T(F): 97.2 (27 Apr 2025 08:21), Max: 99.2 (26 Apr 2025 23:58)  HR: 62 (27 Apr 2025 11:00) (60 - 107)  BP: 115/56 (27 Apr 2025 09:00) (93/49 - 127/53)  BP(mean): 73 (27 Apr 2025 09:00) (58 - 82)  RR: 22 (27 Apr 2025 11:00) (13 - 29)  SpO2: 100% (27 Apr 2025 11:00) (97% - 100%)    Parameters below as of 27 Apr 2025 00:45  Patient On (Oxygen Delivery Method): nasal cannula

## 2025-04-27 NOTE — PROGRESS NOTE ADULT - SUBJECTIVE AND OBJECTIVE BOX
Patient is a 79y old  Male who presents with a chief complaint of GI bleed (27 Apr 2025 09:52)      Subective:  No complaints today -- reports last BM was neg for blood    PAST MEDICAL & SURGICAL HISTORY:  HTN (hypertension)      Hepatic encephalopathy      Liver cirrhosis      Liver cirrhosis          MEDICATIONS  (STANDING):  cefTRIAXone   IVPB 1000 milliGRAM(s) IV Intermittent every 24 hours  chlorhexidine 2% Cloths 1 Application(s) Topical <User Schedule>  nystatin    Suspension 140547 Unit(s) Swish and Swallow four times a day  pantoprazole  Injectable 40 milliGRAM(s) IV Push daily  polyethylene glycol/electrolyte Solution 2000 milliLiter(s) Oral once  rifAXIMin 550 milliGRAM(s) Oral two times a day    MEDICATIONS  (PRN):      REVIEW OF SYSTEMS:    RESPIRATORY: No shortness of breath  CARDIOVASCULAR: No chest pain  All other review of systems is negative unless indicated above.    Vital Signs Last 24 Hrs  T(C): 36.9 (27 Apr 2025 03:45), Max: 37.3 (26 Apr 2025 23:58)  T(F): 98.4 (27 Apr 2025 03:45), Max: 99.2 (26 Apr 2025 23:58)  HR: 66 (27 Apr 2025 08:00) (60 - 107)  BP: 116/54 (27 Apr 2025 08:00) (87/38 - 136/116)  BP(mean): 71 (27 Apr 2025 08:00) (57 - 125)  RR: 20 (27 Apr 2025 08:00) (13 - 36)  SpO2: 100% (27 Apr 2025 08:00) (97% - 100%)    Parameters below as of 27 Apr 2025 00:45  Patient On (Oxygen Delivery Method): nasal cannula        PHYSICAL EXAM:    Constitutional: NAD, well-developed  Respiratory: CTAB  Cardiovascular: S1 and S2, RRR  Gastrointestinal: BS+, soft, NT/ND  Extremities: No peripheral edema  Psychiatric: Normal mood, normal affect    LABS:                        8.3    6.04  )-----------( 61       ( 27 Apr 2025 05:23 )             25.3     04-27    137  |  107  |  38[H]  ----------------------------<  114[H]  4.1   |  21[L]  |  1.70[H]    Ca    9.0      27 Apr 2025 05:23  Phos  3.8     04-27  Mg     1.8     04-27    TPro  6.0  /  Alb  2.9[L]  /  TBili  2.4[H]  /  DBili  x   /  AST  38[H]  /  ALT  20  /  AlkPhos  87  04-27    PT/INR - ( 27 Apr 2025 05:23 )   PT: 13.6 sec;   INR: 1.15 ratio         PTT - ( 26 Apr 2025 11:04 )  PTT:37.7 sec  LIVER FUNCTIONS - ( 27 Apr 2025 05:23 )  Alb: 2.9 g/dL / Pro: 6.0 gm/dL / ALK PHOS: 87 U/L / ALT: 20 U/L / AST: 38 U/L / GGT: x             RADIOLOGY & ADDITIONAL STUDIES:

## 2025-04-28 ENCOUNTER — RESULT REVIEW (OUTPATIENT)
Age: 80
End: 2025-04-28

## 2025-04-28 LAB
ALBUMIN SERPL ELPH-MCNC: 2.9 G/DL — LOW (ref 3.3–5)
ALP SERPL-CCNC: 82 U/L — SIGNIFICANT CHANGE UP (ref 40–120)
ALT FLD-CCNC: 22 U/L — SIGNIFICANT CHANGE UP (ref 12–78)
AMMONIA BLD-MCNC: 57 UMOL/L — HIGH (ref 11–32)
ANION GAP SERPL CALC-SCNC: 11 MMOL/L — SIGNIFICANT CHANGE UP (ref 5–17)
ANION GAP SERPL CALC-SCNC: 8 MMOL/L — SIGNIFICANT CHANGE UP (ref 5–17)
AST SERPL-CCNC: 43 U/L — HIGH (ref 15–37)
BILIRUB SERPL-MCNC: 2.1 MG/DL — HIGH (ref 0.2–1.2)
BUN SERPL-MCNC: 28 MG/DL — HIGH (ref 7–23)
BUN SERPL-MCNC: 30 MG/DL — HIGH (ref 7–23)
CALCIUM SERPL-MCNC: 9 MG/DL — SIGNIFICANT CHANGE UP (ref 8.5–10.1)
CALCIUM SERPL-MCNC: 9.3 MG/DL — SIGNIFICANT CHANGE UP (ref 8.5–10.1)
CHLORIDE SERPL-SCNC: 107 MMOL/L — SIGNIFICANT CHANGE UP (ref 96–108)
CHLORIDE SERPL-SCNC: 110 MMOL/L — HIGH (ref 96–108)
CO2 SERPL-SCNC: 22 MMOL/L — SIGNIFICANT CHANGE UP (ref 22–31)
CO2 SERPL-SCNC: 23 MMOL/L — SIGNIFICANT CHANGE UP (ref 22–31)
CREAT SERPL-MCNC: 1.48 MG/DL — HIGH (ref 0.5–1.3)
CREAT SERPL-MCNC: 1.49 MG/DL — HIGH (ref 0.5–1.3)
EGFR: 47 ML/MIN/1.73M2 — LOW
EGFR: 47 ML/MIN/1.73M2 — LOW
EGFR: 48 ML/MIN/1.73M2 — LOW
EGFR: 48 ML/MIN/1.73M2 — LOW
GLUCOSE SERPL-MCNC: 112 MG/DL — HIGH (ref 70–99)
GLUCOSE SERPL-MCNC: 123 MG/DL — HIGH (ref 70–99)
HCT VFR BLD CALC: 27.5 % — LOW (ref 39–50)
HGB BLD-MCNC: 9.1 G/DL — LOW (ref 13–17)
IMMATURE PLATELET FRACTION #: 1.4 K/UL — LOW (ref 3.9–12.5)
IMMATURE PLATELET FRACTION %: 1.8 % — SIGNIFICANT CHANGE UP (ref 1.6–7.1)
MAGNESIUM SERPL-MCNC: 1.7 MG/DL — SIGNIFICANT CHANGE UP (ref 1.6–2.6)
MAGNESIUM SERPL-MCNC: 1.7 MG/DL — SIGNIFICANT CHANGE UP (ref 1.6–2.6)
MCHC RBC-ENTMCNC: 30.6 PG — SIGNIFICANT CHANGE UP (ref 27–34)
MCHC RBC-ENTMCNC: 33.1 G/DL — SIGNIFICANT CHANGE UP (ref 32–36)
MCV RBC AUTO: 92.6 FL — SIGNIFICANT CHANGE UP (ref 80–100)
NRBC # BLD AUTO: 0 K/UL — SIGNIFICANT CHANGE UP (ref 0–0)
NRBC # FLD: 0 K/UL — SIGNIFICANT CHANGE UP (ref 0–0)
NRBC BLD AUTO-RTO: 0 /100 WBCS — SIGNIFICANT CHANGE UP (ref 0–0)
PHOSPHATE SERPL-MCNC: 3 MG/DL — SIGNIFICANT CHANGE UP (ref 2.5–4.5)
PLATELET # BLD AUTO: 79 K/UL — LOW (ref 150–400)
PMV BLD: 10.3 FL — SIGNIFICANT CHANGE UP (ref 7–13)
POTASSIUM SERPL-MCNC: 4.1 MMOL/L — SIGNIFICANT CHANGE UP (ref 3.5–5.3)
POTASSIUM SERPL-MCNC: 4.1 MMOL/L — SIGNIFICANT CHANGE UP (ref 3.5–5.3)
POTASSIUM SERPL-SCNC: 4.1 MMOL/L — SIGNIFICANT CHANGE UP (ref 3.5–5.3)
POTASSIUM SERPL-SCNC: 4.1 MMOL/L — SIGNIFICANT CHANGE UP (ref 3.5–5.3)
PROT SERPL-MCNC: 5.9 GM/DL — LOW (ref 6–8.3)
RBC # BLD: 2.97 M/UL — LOW (ref 4.2–5.8)
RBC # FLD: 23.3 % — HIGH (ref 10.3–14.5)
SODIUM SERPL-SCNC: 140 MMOL/L — SIGNIFICANT CHANGE UP (ref 135–145)
SODIUM SERPL-SCNC: 141 MMOL/L — SIGNIFICANT CHANGE UP (ref 135–145)
WBC # BLD: 6.67 K/UL — SIGNIFICANT CHANGE UP (ref 3.8–10.5)
WBC # FLD AUTO: 6.67 K/UL — SIGNIFICANT CHANGE UP (ref 3.8–10.5)

## 2025-04-28 PROCEDURE — 45378 DIAGNOSTIC COLONOSCOPY: CPT

## 2025-04-28 PROCEDURE — 93306 TTE W/DOPPLER COMPLETE: CPT | Mod: 26

## 2025-04-28 PROCEDURE — 99233 SBSQ HOSP IP/OBS HIGH 50: CPT

## 2025-04-28 PROCEDURE — 99223 1ST HOSP IP/OBS HIGH 75: CPT

## 2025-04-28 PROCEDURE — 93280 PM DEVICE PROGR EVAL DUAL: CPT | Mod: 26

## 2025-04-28 RX ORDER — LACTULOSE 10 G/15ML
20 SOLUTION ORAL DAILY
Refills: 0 | Status: DISCONTINUED | OUTPATIENT
Start: 2025-04-28 | End: 2025-05-02

## 2025-04-28 RX ORDER — CARVEDILOL 3.12 MG/1
3.12 TABLET, FILM COATED ORAL EVERY 12 HOURS
Refills: 0 | Status: DISCONTINUED | OUTPATIENT
Start: 2025-04-28 | End: 2025-04-29

## 2025-04-28 RX ORDER — MONTELUKAST SODIUM 10 MG/1
10 TABLET ORAL AT BEDTIME
Refills: 0 | Status: DISCONTINUED | OUTPATIENT
Start: 2025-04-28 | End: 2025-05-02

## 2025-04-28 RX ORDER — SPIRONOLACTONE 25 MG
50 TABLET ORAL DAILY
Refills: 0 | Status: DISCONTINUED | OUTPATIENT
Start: 2025-04-28 | End: 2025-05-02

## 2025-04-28 RX ORDER — MAGNESIUM SULFATE 500 MG/ML
2 SYRINGE (ML) INJECTION ONCE
Refills: 0 | Status: COMPLETED | OUTPATIENT
Start: 2025-04-28 | End: 2025-04-28

## 2025-04-28 RX ORDER — FUROSEMIDE 10 MG/ML
40 INJECTION INTRAMUSCULAR; INTRAVENOUS DAILY
Refills: 0 | Status: DISCONTINUED | OUTPATIENT
Start: 2025-04-28 | End: 2025-05-02

## 2025-04-28 RX ADMIN — CEFTRIAXONE 1000 MILLIGRAM(S): 500 INJECTION, POWDER, FOR SOLUTION INTRAMUSCULAR; INTRAVENOUS at 13:18

## 2025-04-28 RX ADMIN — Medication 500000 UNIT(S): at 17:59

## 2025-04-28 RX ADMIN — Medication 1 APPLICATION(S): at 06:21

## 2025-04-28 RX ADMIN — MONTELUKAST SODIUM 10 MILLIGRAM(S): 10 TABLET ORAL at 21:58

## 2025-04-28 RX ADMIN — CARVEDILOL 3.12 MILLIGRAM(S): 3.12 TABLET, FILM COATED ORAL at 21:57

## 2025-04-28 RX ADMIN — LACTULOSE 20 GRAM(S): 10 SOLUTION ORAL at 17:59

## 2025-04-28 RX ADMIN — Medication 25 GRAM(S): at 10:41

## 2025-04-28 RX ADMIN — Medication 1 DOSE(S): at 21:18

## 2025-04-28 RX ADMIN — Medication 40 MILLIGRAM(S): at 10:39

## 2025-04-28 RX ADMIN — Medication 500000 UNIT(S): at 06:19

## 2025-04-28 NOTE — CONSULT NOTE ADULT - SUBJECTIVE AND OBJECTIVE BOX
HPI:  HPI: 78yo male with PMHx of recent diagnosis of A-fib (not on AC), HTN, liver cirrhosis (dx March 2022 from agent orange?) hepatic encephalopathy, hernia x3, recent admitted April 3rd- 9th, 2025 for UGIB 2/2 esophageal varies s/p banding and COVID, now brought in by EMS to the ED with daughter c/o "vomiting blood" (last episode was 7:00-8:00am this morning) and rectal bleeding since last night after eating pepperoni during dinner. Daughter states that patient started vomiting and having maroon stools around 4am this morning, multiple episodes.     In ED /70s, HR 90s. pt had dark red BM in ED and feeling nauseous but no episodes vomiting. states he feels lightheaded with some abd pain in LLQ. appears pale  Daughter meeta at bedside provided history.  (26 Apr 2025 12:27)        PAST MEDICAL & SURGICAL HISTORY:  HTN (hypertension)  Hepatic encephalopathy  Liver cirrhosis  heart block s/p pacemaker            MEDICATIONS  (STANDING):  cefTRIAXone Injectable. 1000 milliGRAM(s) IV Push every 24 hours  chlorhexidine 2% Cloths 1 Application(s) Topical <User Schedule>  nystatin    Suspension [id number] Unit(s) Swish and Swallow four times a day  pantoprazole  Injectable 40 milliGRAM(s) IV Push daily  rifAXIMin 550 milliGRAM(s) Oral two times a day      Allergies    Mushrooms (Anaphylaxis (Severe))  penicillin (Rash)      SOCIAL HISTORY: Denies tobacco, etoh abuse or illicit drug use    FAMILY HISTORY:  FH: HTN (hypertension)        Vital Signs Last 24 Hrs  T(C): 36.6 (28 Apr 2025 11:25), Max: 36.7 (27 Apr 2025 23:00)  T(F): 97.9 (28 Apr 2025 11:25), Max: 98.1 (27 Apr 2025 23:00)  HR: 67 (28 Apr 2025 12:00) (61 - 83)  BP: 118/50 (28 Apr 2025 12:00) (92/49 - 155/121)  BP(mean): 71 (28 Apr 2025 12:00) (56 - 131)  RR: 16 (28 Apr 2025 12:00) (14 - 27)  SpO2: 100% (28 Apr 2025 12:00) (96% - 100%)    Parameters below as of 28 Apr 2025 03:00  Patient On (Oxygen Delivery Method): nasal cannula        REVIEW OF SYSTEMS:    CONSTITUTIONAL:  As per HPI.  HEENT:  Eyes:  No diplopia or blurred vision. ENT:  No earache, sore throat or runny nose.  CARDIOVASCULAR:  No pressure, squeezing, strangling, tightness, heaviness or aching about the chest, neck, axilla or epigastrium.  RESPIRATORY:  No cough, shortness of breath, PND or orthopnea.  GASTROINTESTINAL:  No nausea, vomiting or diarrhea.  GENITOURINARY:  No dysuria, frequency or urgency.  MUSCULOSKELETAL:  As per HPI.  SKIN:  No change in skin, hair or nails.  NEUROLOGIC:  No paresthesias, fasciculations, seizures or weakness.  PSYCHIATRIC:  No disorder of thought or mood.  ENDOCRINE:  No heat or cold intolerance, polyuria or polydipsia.  HEMATOLOGICAL:  No easy bruising or bleedings:  .     PHYSICAL EXAMINATION:    GENERAL APPEARANCE:  Pt. is not currently dyspneic, in no distress. Pt. is alert, oriented, and pleasant.  HEENT:  Pupils are normal and react normally. No icterus. Mucous membranes well colored.  NECK:  Supple. No lymphadenopathy. Jugular venous pressure not elevated. Carotids equal.   HEART:   The cardiac impulse has a normal quality. There are no murmurs, rubs or gallops noted  CHEST:  Chest is clear to auscultation. Normal respiratory effort.  ABDOMEN:  Soft and nontender.   EXTREMITIES:  There is no edema.   SKIN:  No rash or significant lesions are noted.    I&O's Summary    27 Apr 2025 07:01  -  28 Apr 2025 07:00  --------------------------------------------------------  IN: 0 mL / OUT: 300 mL / NET: -300 mL    28 Apr 2025 07:01  -  28 Apr 2025 12:58  --------------------------------------------------------  IN: 0 mL / OUT: 1000 mL / NET: -1000 mL        LABS:                        9.1    6.67  )-----------( 79       ( 28 Apr 2025 06:02 )             27.5     04-28    141  |  110[H]  |  28[H]  ----------------------------<  112[H]  4.1   |  23  |  1.48[H]    Ca    9.0      28 Apr 2025 06:02  Phos  3.0     04-28  Mg     1.7     04-28    TPro  5.9[L]  /  Alb  2.9[L]  /  TBili  2.1[H]  /  DBili  x   /  AST  43[H]  /  ALT  22  /  AlkPhos  82  04-28    LIVER FUNCTIONS - ( 28 Apr 2025 06:02 )  Alb: 2.9 g/dL / Pro: 5.9 gm/dL / ALK PHOS: 82 U/L / ALT: 22 U/L / AST: 43 U/L / GGT: x           PT/INR - ( 27 Apr 2025 05:23 )   PT: 13.6 sec;   INR: 1.15 ratio               Urinalysis Basic - ( 28 Apr 2025 06:02 )    Color: x / Appearance: x / SG: x / pH: x  Gluc: 112 mg/dL / Ketone: x  / Bili: x / Urobili: x   Blood: x / Protein: x / Nitrite: x   Leuk Esterase: x / RBC: x / WBC x   Sq Epi: x / Non Sq Epi: x / Bacteria: x          EKG:    TELEMETRY:    CARDIAC TESTS:    RADIOLOGY & ADDITIONAL STUDIES:    ASSESSMENT & PLAN: HPI:  HPI: 80yo male with PMHx of recent diagnosis of A-fib (not on AC), HTN, liver cirrhosis (dx March 2022 from agent orange?) hepatic encephalopathy, hernia x3, recent admitted April 3rd- 9th, 2025 for UGIB 2/2 esophageal varies s/p banding and COVID, now brought in by EMS to the ED with daughter c/o "vomiting blood" (last episode was 7:00-8:00am this morning) and rectal bleeding since last night after eating pepperoni during dinner. Daughter states that patient started vomiting and having maroon stools around 4am this morning, multiple episodes.     In ED /70s, HR 90s. pt had dark red BM in ED and feeling nauseous but no episodes vomiting. states he feels lightheaded with some abd pain in LLQ. appears pale  Daughter meeta at bedside provided history.      4/28/25: EP asked to interrogate patients pacemaker.  Pacemaker interrogation revealed multiple episodes in March of VT/VF lasitng a approximately 1 second.  Patient denies any recent  cardiac events, is unsure of who checks pacemaker.          PAST MEDICAL & SURGICAL HISTORY:  HTN (hypertension)  Hepatic encephalopathy  Liver cirrhosis  heart block s/p pacemaker            MEDICATIONS  (STANDING):  cefTRIAXone Injectable. 1000 milliGRAM(s) IV Push every 24 hours  chlorhexidine 2% Cloths 1 Application(s) Topical <User Schedule>  nystatin    Suspension [id number] Unit(s) Swish and Swallow four times a day  pantoprazole  Injectable 40 milliGRAM(s) IV Push daily  rifAXIMin 550 milliGRAM(s) Oral two times a day      Allergies    Mushrooms (Anaphylaxis (Severe))  penicillin (Rash)      SOCIAL HISTORY: Denies tobacco, etoh abuse or illicit drug use    FAMILY HISTORY:  FH: HTN (hypertension)        Vital Signs Last 24 Hrs  T(C): 36.6 (28 Apr 2025 11:25), Max: 36.7 (27 Apr 2025 23:00)  T(F): 97.9 (28 Apr 2025 11:25), Max: 98.1 (27 Apr 2025 23:00)  HR: 67 (28 Apr 2025 12:00) (61 - 83)  BP: 118/50 (28 Apr 2025 12:00) (92/49 - 155/121)  BP(mean): 71 (28 Apr 2025 12:00) (56 - 131)  RR: 16 (28 Apr 2025 12:00) (14 - 27)  SpO2: 100% (28 Apr 2025 12:00) (96% - 100%)    Parameters below as of 28 Apr 2025 03:00  Patient On (Oxygen Delivery Method): nasal cannula        REVIEW OF SYSTEMS:    CONSTITUTIONAL:  As per HPI.  HEENT:  Eyes:  No diplopia or blurred vision. ENT:  No earache, sore throat or runny nose.  CARDIOVASCULAR:  No pressure, squeezing, strangling, tightness, heaviness or aching about the chest, neck, axilla or epigastrium.  RESPIRATORY:  No cough, shortness of breath, PND or orthopnea.  GASTROINTESTINAL:  No nausea, vomiting or diarrhea.  GENITOURINARY:  No dysuria, frequency or urgency.  MUSCULOSKELETAL:  As per HPI.  SKIN:  No change in skin, hair or nails.  NEUROLOGIC:  No paresthesias, fasciculations, seizures or weakness.  PSYCHIATRIC:  No disorder of thought or mood.  ENDOCRINE:  No heat or cold intolerance, polyuria or polydipsia.  HEMATOLOGICAL:  No easy bruising or bleedings:  .     PHYSICAL EXAMINATION:    GENERAL APPEARANCE:  Pt. is not currently dyspneic, in no distress. Pt. is alert, oriented, and pleasant.  HEENT:  Pupils are normal and react normally. No icterus. Mucous membranes well colored.  NECK:  Supple. No lymphadenopathy. Jugular venous pressure not elevated. Carotids equal.   HEART:   The cardiac impulse has a normal quality. There are no murmurs, rubs or gallops noted  CHEST:  Chest is clear to auscultation. Normal respiratory effort.  ABDOMEN:  Soft and nontender.   EXTREMITIES:  There is no edema.   SKIN:  No rash or significant lesions are noted.    I&O's Summary    27 Apr 2025 07:01  -  28 Apr 2025 07:00  --------------------------------------------------------  IN: 0 mL / OUT: 300 mL / NET: -300 mL    28 Apr 2025 07:01  -  28 Apr 2025 12:58  --------------------------------------------------------  IN: 0 mL / OUT: 1000 mL / NET: -1000 mL        LABS:                        9.1    6.67  )-----------( 79       ( 28 Apr 2025 06:02 )             27.5     04-28    141  |  110[H]  |  28[H]  ----------------------------<  112[H]  4.1   |  23  |  1.48[H]    Ca    9.0      28 Apr 2025 06:02  Phos  3.0     04-28  Mg     1.7     04-28    TPro  5.9[L]  /  Alb  2.9[L]  /  TBili  2.1[H]  /  DBili  x   /  AST  43[H]  /  ALT  22  /  AlkPhos  82  04-28    LIVER FUNCTIONS - ( 28 Apr 2025 06:02 )  Alb: 2.9 g/dL / Pro: 5.9 gm/dL / ALK PHOS: 82 U/L / ALT: 22 U/L / AST: 43 U/L / GGT: x           PT/INR - ( 27 Apr 2025 05:23 )   PT: 13.6 sec;   INR: 1.15 ratio         Urinalysis Basic - ( 28 Apr 2025 06:02 )    Color: x / Appearance: x / SG: x / pH: x  Gluc: 112 mg/dL / Ketone: x  / Bili: x / Urobili: x   Blood: x / Protein: x / Nitrite: x   Leuk Esterase: x / RBC: x / WBC x   Sq Epi: x / Non Sq Epi: x / Bacteria: x      EKG: < from: 12 Lead ECG (04.27.25 @ 10:13) >  Ventricular Rate 68 BPM    Atrial Rate 72 BPM    QRS Duration 148 ms    Q-T Interval 484 ms    QTC Calculation(Bazett) 514 ms    R Axis 59 degrees    T Axis 43 degrees    Diagnosis Line Ventricular-paced rhythm      TELEMETRY: AF, vpaced    CARDIAC TESTS:    RADIOLOGY & ADDITIONAL STUDIES:

## 2025-04-28 NOTE — DIETITIAN INITIAL EVALUATION ADULT - ORAL INTAKE PTA/DIET HISTORY
Pt lives at home w/ his dtr. Wife recently passed away. Reports "fair" appetite/PO intake. C/o N/V just prior to admit.  Pt lives at home w/ his dtr, both will shop and cook. Wife recently passed away. Reports "fair" appetite/PO intake. C/o N/V just prior to admit.

## 2025-04-28 NOTE — DIETITIAN NUTRITION RISK NOTIFICATION - TREATMENT: THE FOLLOWING DIET HAS BEEN RECOMMENDED
Diet, NPO after Midnight:      NPO Start Date: 27-Apr-2025,   NPO Start Time: 23:59  Except Medications (04-27-25 @ 10:15) [Active]  Diet, Clear Liquid (04-26-25 @ 16:54) [Active]

## 2025-04-28 NOTE — CONSULT NOTE ADULT - NS ATTEND AMEND GEN_ALL_CORE FT
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 I would recommend an ischemic evaluation in this setting as well. Amiodarone is not an ideal drug given the cirrhosis.

## 2025-04-28 NOTE — DIETITIAN INITIAL EVALUATION ADULT - NSFNSGIIOFT_GEN_A_CORE
I&O's Detail    27 Apr 2025 07:01  -  28 Apr 2025 07:00  --------------------------------------------------------  IN:  Total IN: 0 mL    OUT:    Voided (mL): 300 mL  Total OUT: 300 mL    Total NET: -300 mL      28 Apr 2025 07:01  -  28 Apr 2025 13:20  --------------------------------------------------------  IN:  Total IN: 0 mL    OUT:    Voided (mL): 1000 mL  Total OUT: 1000 mL    Total NET: -1000 mL

## 2025-04-28 NOTE — DIETITIAN INITIAL EVALUATION ADULT - NSFNSPHYEXAMSKINFT_GEN_A_CORE
Jonnathan score = 16; ecchymotic  Pressure Injury 1: Right:, heel, Stage I  Pressure Injury 2: Left:, heel, Stage I

## 2025-04-28 NOTE — DIETITIAN INITIAL EVALUATION ADULT - OTHER INFO
80 y/o M with PMHx of recent diagnosis of A-fib (not on AC), HTN, liver cirrhosis (dx March 2022 from agent orange?) hepatic encephalopathy, hernia x3, recent admitted April 3rd- 9th, 2025 for UGIB 2/2 esophageal varies s/p banding and COVID, now brought in by EMS to the ED with daughter c/o "vomiting blood" (last episode was 7:00-8:00am this morning) and rectal bleeding since last night after eating pepperoni during dinner. Daughter states that patient started vomiting and having maroon stools around 4am this morning, multiple episodes. pt had dark red BM in ED and feeling nauseous but no episodes vomiting. states he feels lightheaded with some abd pain in LLQ. appears pale. CTAP with no active bleed. EGD neg for active bleeding. Admit to CCU. Plan for colonoscopy today 4/28. admitting diagnosis: rectal bleeding.     Pt know to RD services prev met criteria for PCM (most recent 4/9/25). Pt NPO for colonoscopy today. Reports does feel hungry. #. RD obtained bedscale wt 183# on 4/28; w/ 2+ and 4+ edema doc'd likely skewing wt. Wt history reviewed: EMR admit wt 187# on 4/3/25;  Per EMR, admit wt of 187#; 183# on 12/14/23 (taken by RD). Unable to determine any pertinent wt changes at this time d/t edema skewing wt. Pt appears overweight, NFPE reveals mild-mod muscle/fat wasting. Recommend to ADAT to low fiber when medically feasible to optimize nutritional status. Add ensure max BID (provides 150kcal, 30g protein) to optimize nutritional needs when diet advanced. Confirm goals of care regarding nutrition support. Please see additional recommendations below.  80 y/o M with PMHx of recent diagnosis of A-fib (not on AC), HTN, liver cirrhosis (dx March 2022 from agent orange?) hepatic encephalopathy, hernia x3, recent admitted April 3rd- 9th, 2025 for UGIB 2/2 esophageal varies s/p banding and COVID, now brought in by EMS to the ED with daughter c/o "vomiting blood" (last episode was 7:00-8:00am this morning) and rectal bleeding since last night after eating pepperoni during dinner. Daughter states that patient started vomiting and having maroon stools around 4am this morning, multiple episodes. pt had dark red BM in ED and feeling nauseous but no episodes vomiting. states he feels lightheaded with some abd pain in LLQ. appears pale. CTAP with no active bleed. EGD neg for active bleeding. Admit to CCU. Plan for colonoscopy today 4/28. admitting diagnosis: rectal bleeding.     Pt know to RD services prev met criteria for PCM (most recent 4/9/25). Pt NPO for colonoscopy today, has been on CLD/NPO since admit (x2 days); meeting <50% ENN x 2 days. Reports does feel hungry. #. RD obtained bedscale wt 183# on 4/28; w/ 2+ and 4+ edema doc'd likely skewing wt. Wt history reviewed: EMR admit wt 187# on 4/3/25;  Per EMR, admit wt of 187#; 183# on 12/14/23 (taken by RD). Unable to determine any pertinent wt changes at this time d/t edema skewing wt. Pt appears overweight, NFPE reveals mild-mod muscle/fat wasting. Recommend to ADAT to low fiber when medically feasible to optimize nutritional status. Add ensure max BID (provides 150kcal, 30g protein) to optimize nutritional needs when diet advanced. Confirm goals of care regarding nutrition support. Please see additional recommendations below.

## 2025-04-28 NOTE — DIETITIAN INITIAL EVALUATION ADULT - PERTINENT MEDS FT
MEDICATIONS  (STANDING):  cefTRIAXone Injectable. 1000 milliGRAM(s) IV Push every 24 hours  chlorhexidine 2% Cloths 1 Application(s) Topical <User Schedule>  nystatin    Suspension [id number] Unit(s) Swish and Swallow four times a day  pantoprazole  Injectable 40 milliGRAM(s) IV Push daily  rifAXIMin 550 milliGRAM(s) Oral two times a day    MEDICATIONS  (PRN):

## 2025-04-28 NOTE — PROGRESS NOTE ADULT - SUBJECTIVE AND OBJECTIVE BOX
OVERNIGHT EVENTS / SUBJECTIVE: Patient seen and examined at bedside.     No acute events overnight. Hgb uptrending this AM. No further bloody BM. Pt denies symptoms currently. Pending colonoscopy today.     OBJECTIVE:    VITAL SIGNS:  ICU Vital Signs Last 24 Hrs  T(C): 36.6 (28 Apr 2025 11:25), Max: 36.7 (27 Apr 2025 23:00)  T(F): 97.9 (28 Apr 2025 11:25), Max: 98.1 (27 Apr 2025 23:00)  HR: 60 (28 Apr 2025 16:00) (60 - 83)  BP: 126/60 (28 Apr 2025 16:00) (92/49 - 155/121)  BP(mean): 79 (28 Apr 2025 16:00) (56 - 131)  ABP: --  ABP(mean): --  RR: 15 (28 Apr 2025 16:00) (14 - 27)  SpO2: 100% (28 Apr 2025 16:00) (96% - 100%)    O2 Parameters below as of 28 Apr 2025 03:00  Patient On (Oxygen Delivery Method): nasal cannula    04-27 @ 07:01 - 04-28 @ 07:00  --------------------------------------------------------  IN: 0 mL / OUT: 300 mL / NET: -300 mL    04-28 @ 07:01 - 04-28 @ 17:52  --------------------------------------------------------  IN: 0 mL / OUT: 1000 mL / NET: -1000 mL      CAPILLARY BLOOD GLUCOSE          PHYSICAL EXAM:    General: NAD  HEENT: NC/AT; clear conjunctiva  Neck: supple  Respiratory: CTA b/l  Cardiovascular: +S1/S2; RRR  Abdomen: soft, NT/ND  Extremities: Warm, no LE edema  Skin: normal color and turgor; no rash  Neurological: A&Ox3, no focal deficit    MEDICATIONS:  MEDICATIONS  (STANDING):  carvedilol 3.125 milliGRAM(s) Oral every 12 hours  cefTRIAXone Injectable. 1000 milliGRAM(s) IV Push every 24 hours  chlorhexidine 2% Cloths 1 Application(s) Topical <User Schedule>  fluticasone propionate/ salmeterol 500-50 MICROgram(s) Diskus 1 Dose(s) Inhalation two times a day  furosemide    Tablet 40 milliGRAM(s) Oral daily  lactulose Syrup 20 Gram(s) Oral daily  montelukast 10 milliGRAM(s) Oral at bedtime  nystatin    Suspension 169604 Unit(s) Swish and Swallow four times a day  pantoprazole  Injectable 40 milliGRAM(s) IV Push daily  rifAXIMin 550 milliGRAM(s) Oral two times a day  spironolactone 50 milliGRAM(s) Oral daily    MEDICATIONS  (PRN):      ALLERGIES:  Allergies    Mushrooms (Anaphylaxis (Severe))  penicillin (Rash)    Intolerances        LABS:                        9.1    6.67  )-----------( 79       ( 28 Apr 2025 06:02 )             27.5     Hemoglobin: 9.1 g/dL (04-28 @ 06:02)  Hemoglobin: 8.3 g/dL (04-27 @ 05:23)  Hemoglobin: 8.2 g/dL (04-26 @ 21:25)  Hemoglobin: 7.4 g/dL (04-26 @ 16:04)  Hemoglobin: 5.4 g/dL (04-26 @ 11:04)    CBC Full  -  ( 28 Apr 2025 06:02 )  WBC Count : 6.67 K/uL  RBC Count : 2.97 M/uL  Hemoglobin : 9.1 g/dL  Hematocrit : 27.5 %  Platelet Count - Automated : 79 K/uL  Mean Cell Volume : 92.6 fl  Mean Cell Hemoglobin : 30.6 pg  Mean Cell Hemoglobin Concentration : 33.1 g/dL  Auto Neutrophil # : x  Auto Lymphocyte # : x  Auto Monocyte # : x  Auto Eosinophil # : x  Auto Basophil # : x  Auto Neutrophil % : x  Auto Lymphocyte % : x  Auto Monocyte % : x  Auto Eosinophil % : x  Auto Basophil % : x    04-28    141  |  110[H]  |  28[H]  ----------------------------<  112[H]  4.1   |  23  |  1.48[H]    Ca    9.0      28 Apr 2025 06:02  Phos  3.0     04-28  Mg     1.7     04-28    TPro  5.9[L]  /  Alb  2.9[L]  /  TBili  2.1[H]  /  DBili  x   /  AST  43[H]  /  ALT  22  /  AlkPhos  82  04-28    Creatinine Trend: 1.48<--, 1.49<--, 1.70<--, 2.04<--, 1.97<--, 1.69<--  LIVER FUNCTIONS - ( 28 Apr 2025 06:02 )  Alb: 2.9 g/dL / Pro: 5.9 gm/dL / ALK PHOS: 82 U/L / ALT: 22 U/L / AST: 43 U/L / GGT: x           PT/INR - ( 27 Apr 2025 05:23 )   PT: 13.6 sec;   INR: 1.15 ratio      hs Troponin:    Urinalysis Basic - ( 28 Apr 2025 06:02 )    Color: x / Appearance: x / SG: x / pH: x  Gluc: 112 mg/dL / Ketone: x  / Bili: x / Urobili: x   Blood: x / Protein: x / Nitrite: x   Leuk Esterase: x / RBC: x / WBC x   Sq Epi: x / Non Sq Epi: x / Bacteria: x    CSF:      EKG:   MICROBIOLOGY:    Urinalysis with Rflx Culture (collected 26 Apr 2025 21:15)      IMAGING:      Labs, imaging, EKG personally reviewed    RADIOLOGY & ADDITIONAL TESTS: Reviewed.

## 2025-04-28 NOTE — PROGRESS NOTE ADULT - ASSESSMENT
80 y/o male with PMHx of recent diagnosis of A-fib (not on AC), s/p PPM, AS, s/p TAVR, HTN, liver cirrhosis (dx March 2022 from agent orange?) hepatic encephalopathy, hernia x3, recently admitted April 3rd- 9th, 2025 for UGIB 2/2 esophageal varies s/p banding and COVID, now brought in by EMS to the ED with daughter c/o hematemesis and rectal bleeding, hemodynamically stable in ED but lightheaded, CT A/P showing no active bleed but blood in colon, admitted to CCU for close monitoring and further management, s/p 4u pRBC, 1FFP, 1plt during hospital course thus far, EGD 4/26 w/ esophagitis but no bleeding. Of note PPM interrogated during this admission showing multiple episodes of VT/VF.    Problems:  #Acute blood loss anemia  #GI bleed  #BRENNAN on CKD  #NSVT (also episodes of VT/VF on PPM interrogation)    PLAN  - Mentation intact, cont to monitor  - Hemodynamically stable, some short episodes of NSVT on monitor, EP interrogated PPM revealing episodes of VT/VF, obtain TTE, cards consulted for ischemia eval, if remains stable post-colonoscopy will restart home coreg  - On room air  - CLD, esophagitis on EGD c/w nystatin, plan for colo today, mod ascites on CT last tapped a few weeks ago, will restart maintenance lasix/spironolactone if stable after colo and no further active bleeding, c/w home lactulose and rifax for HE  - Cr improving, appears to be at baseline, cont to monitor renal indices/UOP  - Afebrile, no leukocytosis, UA negative, c/w CTX GIB ppx  - DVT ppx SCDs, hold pharm AC in setting of bleed, hgb now stable

## 2025-04-28 NOTE — DIETITIAN INITIAL EVALUATION ADULT - PERTINENT LABORATORY DATA
04-28    141  |  110[H]  |  28[H]  ----------------------------<  112[H]  4.1   |  23  |  1.48[H]    Ca    9.0      28 Apr 2025 06:02  Phos  3.0     04-28  Mg     1.7     04-28    TPro  5.9[L]  /  Alb  2.9[L]  /  TBili  2.1[H]  /  DBili  x   /  AST  43[H]  /  ALT  22  /  AlkPhos  82  04-28  A1C with Estimated Average Glucose Result: 4.8 % (09-07-24 @ 07:52)

## 2025-04-28 NOTE — DIETITIAN INITIAL EVALUATION ADULT - REASON FOR ADMISSION
[FreeTextEntry1] : . 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 Decompensated alcohol-related cirrhosis \par reported no alcohol use \par  He has complaint on furosemide and spirolactone \par He has been following a low salt diet.  \par HE: No recent episode of HE. He takes lactulose TID and has 2-4 bowel movements a day. \par Esophageal Variceal, hepatitis screen as per per GI \par \par \par 2. Health Maintenance \par Immunizations: Immune or Susceptible to HAV, s/p HBV vaccination, COVID vaccine, today given Pneumovax and Flu shot  \par Colonoscopy: 06/01/21- showed diverticulosis in the entire examined colon, to repeat in 10 years\par \par \par Follow Up: 6 months and prn\par \par \par  GI bleed

## 2025-04-28 NOTE — DIETITIAN INITIAL EVALUATION ADULT - ADD RECOMMEND
1. ADAT to low fiber when medically feasible to optimize nutritional status  2. Add ensure max BID (provides 150kcal, 30g protein) to optimize nutritional needs when diet advanced  3. MVI w/ minerals daily to ensure 100% RDA met  4. Consider adding thiamine 100 mg daily 2/2 poor PO intake/ malnutrition  5. Monitor daily lytes/min and replete prn  6. Monitor daily wt to track/trend changes; strict I/Os   7. Monitor bowel movements, if no BM for >3 days, consider implementing bowel regimen.  8. Confirm goals of care regarding nutrition support.   RD will continue to monitor PO intake, labs, hydration, and wt prn.

## 2025-04-29 LAB
ALBUMIN SERPL ELPH-MCNC: 2.7 G/DL — LOW (ref 3.3–5)
ALP SERPL-CCNC: 107 U/L — SIGNIFICANT CHANGE UP (ref 40–120)
ALT FLD-CCNC: 17 U/L — SIGNIFICANT CHANGE UP (ref 12–78)
ANION GAP SERPL CALC-SCNC: 6 MMOL/L — SIGNIFICANT CHANGE UP (ref 5–17)
AST SERPL-CCNC: 32 U/L — SIGNIFICANT CHANGE UP (ref 15–37)
BILIRUB SERPL-MCNC: 1.4 MG/DL — HIGH (ref 0.2–1.2)
BUN SERPL-MCNC: 27 MG/DL — HIGH (ref 7–23)
CALCIUM SERPL-MCNC: 9.3 MG/DL — SIGNIFICANT CHANGE UP (ref 8.5–10.1)
CHLORIDE SERPL-SCNC: 112 MMOL/L — HIGH (ref 96–108)
CO2 SERPL-SCNC: 23 MMOL/L — SIGNIFICANT CHANGE UP (ref 22–31)
CREAT SERPL-MCNC: 1.45 MG/DL — HIGH (ref 0.5–1.3)
EGFR: 49 ML/MIN/1.73M2 — LOW
EGFR: 49 ML/MIN/1.73M2 — LOW
GLUCOSE SERPL-MCNC: 216 MG/DL — HIGH (ref 70–99)
HCT VFR BLD CALC: 28.1 % — LOW (ref 39–50)
HGB BLD-MCNC: 8.8 G/DL — LOW (ref 13–17)
IMMATURE PLATELET FRACTION #: 2.7 K/UL — LOW (ref 3.9–12.5)
IMMATURE PLATELET FRACTION %: 3 % — SIGNIFICANT CHANGE UP (ref 1.6–7.1)
MAGNESIUM SERPL-MCNC: 2.2 MG/DL — SIGNIFICANT CHANGE UP (ref 1.6–2.6)
MCHC RBC-ENTMCNC: 30.1 PG — SIGNIFICANT CHANGE UP (ref 27–34)
MCHC RBC-ENTMCNC: 31.3 G/DL — LOW (ref 32–36)
MCV RBC AUTO: 96.2 FL — SIGNIFICANT CHANGE UP (ref 80–100)
NRBC # BLD AUTO: 0 K/UL — SIGNIFICANT CHANGE UP (ref 0–0)
NRBC # FLD: 0 K/UL — SIGNIFICANT CHANGE UP (ref 0–0)
NRBC BLD AUTO-RTO: 0 /100 WBCS — SIGNIFICANT CHANGE UP (ref 0–0)
PHOSPHATE SERPL-MCNC: 3.3 MG/DL — SIGNIFICANT CHANGE UP (ref 2.5–4.5)
PLATELET # BLD AUTO: 90 K/UL — LOW (ref 150–400)
PMV BLD: 11.5 FL — SIGNIFICANT CHANGE UP (ref 7–13)
POTASSIUM SERPL-MCNC: 4.1 MMOL/L — SIGNIFICANT CHANGE UP (ref 3.5–5.3)
POTASSIUM SERPL-SCNC: 4.1 MMOL/L — SIGNIFICANT CHANGE UP (ref 3.5–5.3)
PROT SERPL-MCNC: 5.9 GM/DL — LOW (ref 6–8.3)
RBC # BLD: 2.92 M/UL — LOW (ref 4.2–5.8)
RBC # FLD: 23.7 % — HIGH (ref 10.3–14.5)
SODIUM SERPL-SCNC: 141 MMOL/L — SIGNIFICANT CHANGE UP (ref 135–145)
WBC # BLD: 7.48 K/UL — SIGNIFICANT CHANGE UP (ref 3.8–10.5)
WBC # FLD AUTO: 7.48 K/UL — SIGNIFICANT CHANGE UP (ref 3.8–10.5)

## 2025-04-29 PROCEDURE — 99223 1ST HOSP IP/OBS HIGH 75: CPT

## 2025-04-29 PROCEDURE — 99233 SBSQ HOSP IP/OBS HIGH 50: CPT

## 2025-04-29 RX ADMIN — Medication 50 MILLIGRAM(S): at 12:57

## 2025-04-29 RX ADMIN — Medication 40 MILLIGRAM(S): at 10:08

## 2025-04-29 RX ADMIN — Medication 500000 UNIT(S): at 12:44

## 2025-04-29 RX ADMIN — Medication 500000 UNIT(S): at 17:43

## 2025-04-29 RX ADMIN — Medication 500000 UNIT(S): at 06:16

## 2025-04-29 RX ADMIN — MONTELUKAST SODIUM 10 MILLIGRAM(S): 10 TABLET ORAL at 21:37

## 2025-04-29 RX ADMIN — LACTULOSE 20 GRAM(S): 10 SOLUTION ORAL at 10:08

## 2025-04-29 RX ADMIN — Medication 1 DOSE(S): at 09:12

## 2025-04-29 RX ADMIN — Medication 1 DOSE(S): at 20:34

## 2025-04-29 RX ADMIN — FUROSEMIDE 40 MILLIGRAM(S): 10 INJECTION INTRAMUSCULAR; INTRAVENOUS at 10:08

## 2025-04-29 RX ADMIN — CEFTRIAXONE 1000 MILLIGRAM(S): 500 INJECTION, POWDER, FOR SOLUTION INTRAMUSCULAR; INTRAVENOUS at 12:45

## 2025-04-29 RX ADMIN — CARVEDILOL 3.12 MILLIGRAM(S): 3.12 TABLET, FILM COATED ORAL at 10:08

## 2025-04-29 RX ADMIN — Medication 1 APPLICATION(S): at 06:16

## 2025-04-29 NOTE — PROGRESS NOTE ADULT - SUBJECTIVE AND OBJECTIVE BOX
OVERNIGHT EVENTS / SUBJECTIVE: Patient seen and examined at bedside.     No acute events overnight. No further bloody BMs. Pt reports feeling well. No further NSVT episodes since yesterday.    OBJECTIVE:    VITAL SIGNS:  ICU Vital Signs Last 24 Hrs  T(C): 36.4 (29 Apr 2025 05:00), Max: 37.3 (28 Apr 2025 23:31)  T(F): 97.5 (29 Apr 2025 05:00), Max: 99.2 (28 Apr 2025 23:31)  HR: 77 (29 Apr 2025 10:00) (60 - 95)  BP: 116/59 (29 Apr 2025 10:00) (105/87 - 137/55)  BP(mean): 77 (29 Apr 2025 10:00) (70 - 108)  ABP: --  ABP(mean): --  RR: 15 (29 Apr 2025 10:00) (12 - 26)  SpO2: 94% (29 Apr 2025 10:00) (94% - 100%)    O2 Parameters below as of 29 Apr 2025 09:13  Patient On (Oxygen Delivery Method): room air              04-28 @ 07:01  -  04-29 @ 07:00  --------------------------------------------------------  IN: 0 mL / OUT: 1300 mL / NET: -1300 mL      CAPILLARY BLOOD GLUCOSE          PHYSICAL EXAM:    General: NAD  HEENT: NC/AT; clear conjunctiva  Neck: supple  Respiratory: CTA b/l  Cardiovascular: +S1/S2; RRR  Abdomen: soft, NT/ND  Extremities: Warm, no LE edema  Skin: normal color and turgor; no rash  Neurological: A&Ox3, no focal deficit    MEDICATIONS:  MEDICATIONS  (STANDING):  carvedilol 3.125 milliGRAM(s) Oral every 12 hours  cefTRIAXone Injectable. 1000 milliGRAM(s) IV Push every 24 hours  chlorhexidine 2% Cloths 1 Application(s) Topical <User Schedule>  fluticasone propionate/ salmeterol 500-50 MICROgram(s) Diskus 1 Dose(s) Inhalation two times a day  furosemide    Tablet 40 milliGRAM(s) Oral daily  lactulose Syrup 20 Gram(s) Oral daily  montelukast 10 milliGRAM(s) Oral at bedtime  nystatin    Suspension [id number] Unit(s) Swish and Swallow four times a day  pantoprazole  Injectable 40 milliGRAM(s) IV Push daily  rifAXIMin 550 milliGRAM(s) Oral two times a day  spironolactone 50 milliGRAM(s) Oral daily    MEDICATIONS  (PRN):      ALLERGIES:  Allergies    Mushrooms (Anaphylaxis (Severe))  penicillin (Rash)    Intolerances        LABS:                        8.8    7.48  )-----------( 90       ( 29 Apr 2025 05:24 )             28.1     Hemoglobin: 8.8 g/dL (04-29 @ 05:24)  Hemoglobin: 9.1 g/dL (04-28 @ 06:02)  Hemoglobin: 8.3 g/dL (04-27 @ 05:23)  Hemoglobin: 8.2 g/dL (04-26 @ 21:25)  Hemoglobin: 7.4 g/dL (04-26 @ 16:04)    CBC Full  -  ( 29 Apr 2025 05:24 )  WBC Count : 7.48 K/uL  RBC Count : 2.92 M/uL  Hemoglobin : 8.8 g/dL  Hematocrit : 28.1 %  Platelet Count - Automated : 90 K/uL  Mean Cell Volume : 96.2 fl  Mean Cell Hemoglobin : 30.1 pg  Mean Cell Hemoglobin Concentration : 31.3 g/dL  Auto Neutrophil # : x  Auto Lymphocyte # : x  Auto Monocyte # : x  Auto Eosinophil # : x  Auto Basophil # : x  Auto Neutrophil % : x  Auto Lymphocyte % : x  Auto Monocyte % : x  Auto Eosinophil % : x  Auto Basophil % : x    04-29    141  |  112[H]  |  27[H]  ----------------------------<  216[H]  4.1   |  23  |  1.45[H]    Ca    9.3      29 Apr 2025 05:24  Phos  3.3     04-29  Mg     2.2     04-29    TPro  5.9[L]  /  Alb  2.7[L]  /  TBili  1.4[H]  /  DBili  x   /  AST  32  /  ALT  17  /  AlkPhos  107  04-29    Creatinine Trend: 1.45<--, 1.48<--, 1.49<--, 1.70<--, 2.04<--, 1.97<--  LIVER FUNCTIONS - ( 29 Apr 2025 05:24 )  Alb: 2.7 g/dL / Pro: 5.9 gm/dL / ALK PHOS: 107 U/L / ALT: 17 U/L / AST: 32 U/L / GGT: x           hs Troponin:    Urinalysis Basic - ( 29 Apr 2025 05:24 )    Color: x / Appearance: x / SG: x / pH: x  Gluc: 216 mg/dL / Ketone: x  / Bili: x / Urobili: x   Blood: x / Protein: x / Nitrite: x   Leuk Esterase: x / RBC: x / WBC x   Sq Epi: x / Non Sq Epi: x / Bacteria: x    CSF:    EKG:   MICROBIOLOGY:    Urinalysis with Rflx Culture (collected 26 Apr 2025 21:15)      IMAGING:      Labs, imaging, EKG personally reviewed    RADIOLOGY & ADDITIONAL TESTS: Reviewed.

## 2025-04-29 NOTE — PROGRESS NOTE ADULT - ASSESSMENT
ASSESSMENT & PLAN: 79 year old male with above history, admitted with UGIB noted to have episodes of VT/VF in March.        Cardiology consult/ischemia eval pending  Patient is not a candidate for AAD with Amiodarone or Mexiletine given cirrhosis  Patient with normal LV function  Will start Propranolol 40mg PO q8h since it has additional antiarrythmic property compare to other beta blockers, transition to long acting upon discharge  Maintain K>4, Mag >2  Patient to transfer to telemetry  Further management per medicine/cardiology  Plan discussed with patient, CCU team and Dr. Beckman

## 2025-04-29 NOTE — PHYSICAL THERAPY INITIAL EVALUATION ADULT - PLANNED THERAPY INTERVENTIONS, PT EVAL
Eval, amb, transfers, bed mobility  x 15'. Pt left OOB in chair with all observation section equipment/material intact and pad alarm intact/activated. Pt with no c/o pain. Will cont to follow./bed mobility training/gait training/transfer training

## 2025-04-29 NOTE — PROGRESS NOTE ADULT - SUBJECTIVE AND OBJECTIVE BOX
HPI:  HPI: 78yo male with PMHx of recent diagnosis of A-fib (not on AC), HTN, liver cirrhosis (dx March 2022 from agent orange?) hepatic encephalopathy, hernia x3, recent admitted April 3rd- 9th, 2025 for UGIB 2/2 esophageal varies s/p banding and COVID, now brought in by EMS to the ED with daughter c/o "vomiting blood" (last episode was 7:00-8:00am this morning) and rectal bleeding since last night after eating pepperoni during dinner. Daughter states that patient started vomiting and having maroon stools around 4am this morning, multiple episodes.     In ED /70s, HR 90s. pt had dark red BM in ED and feeling nauseous but no episodes vomiting. states he feels lightheaded with some abd pain in LLQ. appears pale  Daughter meeta at bedside provided history.      4/28/25: EP asked to interrogate patients pacemaker.  Pacemaker interrogation revealed multiple episodes in March of VT/VF lasitng a approximately 1 second.  Patient denies any recent  cardiac events, is unsure of who checks pacemaker.    4/29/25: Patient s/p colonoscopy yesterday.  Seen at bedside, no cardiac complaints  TELE:     MEDICATIONS  (STANDING):  carvedilol 3.125 milliGRAM(s) Oral every 12 hours  cefTRIAXone Injectable. 1000 milliGRAM(s) IV Push every 24 hours  chlorhexidine 2% Cloths 1 Application(s) Topical <User Schedule>  fluticasone propionate/ salmeterol 500-50 MICROgram(s) Diskus 1 Dose(s) Inhalation two times a day  furosemide    Tablet 40 milliGRAM(s) Oral daily  lactulose Syrup 20 Gram(s) Oral daily  montelukast 10 milliGRAM(s) Oral at bedtime  nystatin    Suspension [id number] Unit(s) Swish and Swallow four times a day  pantoprazole  Injectable 40 milliGRAM(s) IV Push daily  rifAXIMin 550 milliGRAM(s) Oral two times a day  spironolactone 50 milliGRAM(s) Oral daily    Vital Signs Last 24 Hrs  T(C): 36.4 (29 Apr 2025 05:00), Max: 37.3 (28 Apr 2025 23:31)  T(F): 97.5 (29 Apr 2025 05:00), Max: 99.2 (28 Apr 2025 23:31)  HR: 77 (29 Apr 2025 10:00) (60 - 95)  BP: 116/59 (29 Apr 2025 10:00) (105/87 - 137/55)  BP(mean): 77 (29 Apr 2025 10:00) (70 - 108)  RR: 15 (29 Apr 2025 10:00) (12 - 26)  SpO2: 94% (29 Apr 2025 10:00) (94% - 100%)    Parameters below as of 29 Apr 2025 09:13  Patient On (Oxygen Delivery Method): room air      PHYSICAL EXAMINATION:    GENERAL APPEARANCE:  Pt. is not currently dyspneic, in no distress. Pt. is alert, oriented, and pleasant.  HEENT:  Pupils are normal and react normally. No icterus. Mucous membranes well colored.  NECK:  Supple. No lymphadenopathy. Jugular venous pressure not elevated. Carotids equal.   HEART:   The cardiac impulse has a normal quality. There are no murmurs, rubs or gallops noted  CHEST:  Chest is clear to auscultation. Normal respiratory effort.  ABDOMEN:  Soft and nontender.   EXTREMITIES:  There is no edema.   SKIN:  No rash or significant lesions are noted.    LABS:  04-29    141  |  112[H]  |  27[H]  ----------------------------<  216[H]  4.1   |  23  |  1.45[H]    Ca    9.3      29 Apr 2025 05:24  Phos  3.3     04-29  Mg     2.2     04-29    TPro  5.9[L]  /  Alb  2.7[L]  /  TBili  1.4[H]  /  DBili  x   /  AST  32  /  ALT  17  /  AlkPhos  107  04-29                          8.8    7.48  )-----------( 90       ( 29 Apr 2025 05:24 )             28.1                          EKG: < from: 12 Lead ECG (04.27.25 @ 10:13) >  Ventricular Rate 68 BPM    Atrial Rate 72 BPM    QRS Duration 148 ms    Q-T Interval 484 ms    QTC Calculation(Bazett) 514 ms    R Axis 59 degrees    T Axis 43 degrees    Diagnosis Line Ventricular-paced rhythm      CARDIAC TESTS:  Pt. Name:       YUMIKO REBOLLEDO Study Date:    4/28/2025  MRN:            [id number]           YOB: 1945  Accession #:    [id number]          Age:           79 years  Account#:       [id number]       Gender:        M  Heart Rate:                        Height:        67.00 in (170.18 cm)  Rhythm:          Weight:        184.00 lb (83.46 kg)  Blood Pressure: 126/60 mmHg        BSA/BMI:       1.95 m² / 28.82 kg/m²  ________________________________________________________________________________________  Referring Physician:    [id number] Sheldon Love  Interpreting Physician: Christiano Ivan MD  Primary Sonographer:    Chelle Anguiano RDCS    CPT:               ECHO TTE WO CON COMP W DOPP - 45043.m  Indication(s):     Other cardiomyopathies - I42.8  Procedure:         Transthoracic echocardiogram with 2-D, M-mode and complete                     spectral and color flow Doppler.  Ordering Location:   Admission Status:  Inpatient  Study Information: Image quality for this study is adequate.    _______________________________________________________________________________________     CONCLUSIONS:      1. Left ventricular systolic function is normal with an ejection fraction visually estimated at 60 to 65 %.   2. Left atrium is severely dilated.   3. Mild left ventricular hypertrophy.   4. A a transcatheter deployed (TAVR) is present in the aortic positionThe peak transaortic velocity is 2.59 m/s, peak transaortic gradient is 26.8 mmHg and mean transaortic gradient is 14.0 mmHg with an LVOT/aortic valve VTI ratio of 0.42.        RADIOLOGY & ADDITIONAL STUDIES:

## 2025-04-29 NOTE — PROGRESS NOTE ADULT - ASSESSMENT
80yo male with PMHx of recent diagnosis of A-fib (not on AC), HTN, liver cirrhosis (dx March 2022 from agent orange?) hepatic encephalopathy, hernia x3, recent admitted April 3rd- 9th, 2025 for UGIB 2/2 esophageal varies s/p banding and COVID, now brought in by EMS to the ED with daughter c/o "vomiting blood" (last episode was 7:00-8:00am this morning) and rectal bleeding since last night after eating pepperoni during dinner. Daughter states that patient started vomiting and having maroon stools around 4am this morning, multiple episodes.     In ED /70s, HR 90s. pt had dark red BM in ED and feeling nauseous but no episodes vomiting. states he feels lightheaded with some abd pain in LLQ. CT A/P showing no active bleed but blood in colon, admitted to CCU for close monitoring and further management, s/p 4u pRBC, 1FFP, 1plt during hospital course thus far, EGD 4/26 w/ esophagitis but no bleeding. Colonoscopy 4/28 negative. Of note PPM interrogated during this admission showing multiple episodes of VT/VF. Patient was seen by cardiology and EP recommended switching coreg to propanolol 40 mg q8 hours. TTE showed EF 60-65% with left atrium severely dilated mild LVH. Patient medically stable for downgrade form CCU.     #Cirrhosis   #Esophageal bleeding   #Hepatic encephalopathy   #Ascites  Unclear cause of cirrhosis agent orange vs etoh use?   - s/p octreotide gtt   -s/p 4u pRBC, 1FFP, 1plt   -EGD 4/26 w/ esophagitis but no bleeding  - colonoscopy done 4/28 which was negative   - consider video capsule endoscopy outpatient   - GI consulted, appreciate recs   - cw protonix 40 mg daily   - advanced diet to regular   - trend H&H daily   - trend hepatic function daily   - maintain active type and screen   - resume home lasix 40 mg daily/adlactone 50 mg daily   - resume home lactulose 20 g daily rifaximin 550 mg bid for PSE     #Afib   #AS s/p TAVR  #PPM   #Multiple episode VT/VF   - not on AC for afib given GI bleed   - Echo this admission: EF 60 to 65 %. Left atrium is severely dilated.Mild left ventricular hypertrophy. A a transcatheter deployed (TAVR) is present in the aortic positionThe peak transaortic velocity is 2.59 m/s, peak transaortic gradient is 26.8 mmHg and mean transaortic gradient is 14.0 mmHg with an LVOT/aortic valve VTI ratio of 0.42.  - EP consulted appreciate recs  - Cardiology consulted appreciate recs   - pt not candidiate for AAD with amio or mexiletine given cirrhosis   - switch coreg to propanolol 40 mg po 18 hrs   - maintain K>4, Mag >2     #CKD   #BRENNAN on CKD  Patient with CKD baseline cr ~1.6 developed brennan likely prerenal 2/2 to gi bleed cr peaked at 2.04  - trend bmp function daily     #Dispo: d/c home with home pt

## 2025-04-29 NOTE — PHYSICAL THERAPY INITIAL EVALUATION ADULT - ADDITIONAL COMMENTS
info obtained from eval 4/8/2025. info obtained from eval 4/8/2025. Update 4/29/2025: info same as previously documented.

## 2025-04-29 NOTE — PROGRESS NOTE ADULT - ASSESSMENT
80 y/o male with PMHx of recent diagnosis of A-fib (not on AC), s/p PPM, AS, s/p TAVR, HTN, liver cirrhosis (dx March 2022 from agent orange?) hepatic encephalopathy, hernia x3, recently admitted April 3rd- 9th, 2025 for UGIB 2/2 esophageal varies s/p banding and COVID, now brought in by EMS to the ED with daughter c/o hematemesis and rectal bleeding, hemodynamically stable in ED but lightheaded, CT A/P showing no active bleed but blood in colon, admitted to CCU for close monitoring and further management, s/p 4u pRBC, 1FFP, 1plt during hospital course thus far, EGD 4/26 w/ esophagitis but no bleeding. Of note PPM interrogated during this admission showing multiple episodes of VT/VF.    Problems:  #Acute blood loss anemia  #GI bleed  #BRENNAN on CKD  #NSVT (also episodes of VT/VF on PPM interrogation)    PLAN  - Mentation intact, cont to monitor  - Hemodynamically stable, NSVT resolved after restarting home coreg, EP interrogated PPM revealing episodes of VT/VF, recommending switch from coreg to propranolol 40mg q8h, TTE reviewed nl LVEF, cards consulted for ischemia eval, no chest pain  - On room air  - Advanced to reg diet, esophagitis on EGD c/w nystatin, colonoscopy 4/28 negative, mod ascites on CT last tapped a few weeks ago, restarted maintenance lasix/spironolactone yesterday, c/w home lactulose and rifax for HE  - Cr improving, appears to be at baseline, cont to monitor renal indices/UOP  - Afebrile, no leukocytosis, UA negative, d/c CTX GIB ppx  - DVT ppx SCDs, hold pharm AC in setting of bleed, hgb now stable    Dispo: Pt medically stable for transfer to [facility], d/w Dr. Frances, d/w EP 80 y/o male with PMHx of recent diagnosis of A-fib (not on AC), s/p PPM, AS, s/p TAVR, HTN, liver cirrhosis (dx March 2022 from agent orange?) hepatic encephalopathy, hernia x3, recently admitted April 3rd- 9th, 2025 for UGIB 2/2 esophageal varies s/p banding and COVID, now brought in by EMS to the ED with daughter c/o hematemesis and rectal bleeding, hemodynamically stable in ED but lightheaded, CT A/P showing no active bleed but blood in colon, admitted to CCU for close monitoring and further management, s/p 4u pRBC, 1FFP, 1plt during hospital course thus far, EGD 4/26 w/ esophagitis but no bleeding. Of note PPM interrogated during this admission showing multiple episodes of VT/VF.    Problems:  #Acute blood loss anemia  #GI bleed  #BRENNAN on CKD  #NSVT (also episodes of VT/VF on PPM interrogation)    PLAN  - Mentation intact, cont to monitor  - Hemodynamically stable, NSVT resolved after restarting home coreg, EP interrogated PPM revealing episodes of VT/VF, recommending switch from coreg to propranolol 40mg q8h, TTE reviewed nl LVEF, cards consulted for ischemia eval, no chest pain  - On room air  - Advanced to reg diet, esophagitis on EGD c/w nystatin, colonoscopy 4/28 negative, mod ascites on CT last tapped a few weeks ago, restarted maintenance lasix/spironolactone yesterday, c/w home lactulose and rifax for HE  - Cr improving, appears to be at baseline, cont to monitor renal indices/UOP  - Afebrile, no leukocytosis, UA negative, d/c CTX GIB ppx  - DVT ppx SCDs, hold pharm AC in setting of bleed, hgb now stable  - PT eval    Dispo: Pt medically stable for transfer to [facility], d/w Dr. Frances, d/w EP

## 2025-04-29 NOTE — PROGRESS NOTE ADULT - SUBJECTIVE AND OBJECTIVE BOX
Patient is a 79y old  Male who presents with a chief complaint of GI bleed (29 Apr 2025 13:27)    HPI and hospital course:   80yo male with PMHx of recent diagnosis of A-fib (not on AC), HTN, liver cirrhosis (dx March 2022 from agent orange?) hepatic encephalopathy, hernia x3, recent admitted April 3rd- 9th, 2025 for UGIB 2/2 esophageal varies s/p banding and COVID, now brought in by EMS to the ED with daughter c/o "vomiting blood" (last episode was 7:00-8:00am this morning) and rectal bleeding since last night after eating pepperoni during dinner. Daughter states that patient started vomiting and having maroon stools around 4am this morning, multiple episodes.     In ED /70s, HR 90s. pt had dark red BM in ED and feeling nauseous but no episodes vomiting. states he feels lightheaded with some abd pain in LLQ. CT A/P showing no active bleed but blood in colon, admitted to CCU for close monitoring and further management, s/p 4u pRBC, 1FFP, 1plt during hospital course thus far, EGD 4/26 w/ esophagitis but no bleeding. Colonoscopy 4/28 negative. Of note PPM interrogated during this admission showing multiple episodes of VT/VF. Patient was seen by cardiology and EP recommended switching coreg to propanolol 40 mg q8 hours. TTE showed EF 60-65% with left atrium severely dilated mild LVH. Patient medically stable for downgrade form CCU.       PAST MEDICAL & SURGICAL HISTORY:  HTN (hypertension)      Hepatic encephalopathy      Liver cirrhosis      Liver cirrhosis        FAMILY HISTORY:  FH: HTN (hypertension)      Social History:      Allergies    Mushrooms (Anaphylaxis (Severe))  penicillin (Rash)    Intolerances        MEDICATIONS  (STANDING):  chlorhexidine 2% Cloths 1 Application(s) Topical <User Schedule>  fluticasone propionate/ salmeterol 500-50 MICROgram(s) Diskus 1 Dose(s) Inhalation two times a day  furosemide    Tablet 40 milliGRAM(s) Oral daily  lactulose Syrup 20 Gram(s) Oral daily  montelukast 10 milliGRAM(s) Oral at bedtime  nystatin    Suspension 263179 Unit(s) Swish and Swallow four times a day  pantoprazole  Injectable 40 milliGRAM(s) IV Push daily  propranolol 40 milliGRAM(s) Oral every 8 hours  rifAXIMin 550 milliGRAM(s) Oral two times a day  spironolactone 50 milliGRAM(s) Oral daily    MEDICATIONS  (PRN):      ROS:  General:  No fevers, chills, or unexplained weight loss  Skin: No rash or bothersome skin lesions  Musculoskeletal: No arthalgias, myalgias or joint swelling  Eyes: No visual changes or eye pain  Ears: No hearing loss , otorrhea or ear pain  Nose, Mouth, Throat: No nasal congestion, rhinorrhea, oral lesions, postnasal drip or sore throat  Cardio: No chest pain or palpitations. no lower extremity edema. no syncope. no claudication.   Respiratory: No cough, shortness of breath or wheezing   GI: No diarrhea, constipation, blood in stools, abdominal pain, vomiting or heartburn  : No urinary frequency, hematuria, incontinence, or dysuria  Neurologic: No headaches, parasthesias, confusion, dysarthria or gait instability  Psychiatric:  No anxiety or depression  Lymphatic:  No easy bruising, easy bleeding or swollen glands  Allergic: No itching, sneezing , watery eyes, clear rhinorrhea or recurrent infections    PEx  T(C): 36.2 (04-29-25 @ 10:54), Max: 37.3 (04-28-25 @ 23:31)  HR: 81 (04-29-25 @ 13:00) (60 - 95)  BP: 116/60 (04-29-25 @ 13:00) (105/87 - 137/55)  RR: 17 (04-29-25 @ 13:00) (12 - 26)  SpO2: 94% (04-29-25 @ 13:00) (94% - 100%)  Wt(kg): --  General:     no acute distress, no identifying marks , scars, or tattoos.  Skin: no rash or prominent lesions  Head: normocephalic, atraumatic     Sinuses: non-tender  Nose: no external lesions, mucosa non-inflamed, septum and turbinates normal  Throat: no erythema, exudates or lesions.  Neck: Supple without lymphadenopathy. Thyroid no thyromegaly, no palpable thyroid nodules, no palpable nodules or masses, carotid arteries no bruits.   Breasts: No palpable masses or lesions.  Heart: RRR, no murmur or gallop.  Normal S1, S2.  No S3, S4.   Lungs: CTA bilaterally, no wheezes, rhonchi, rales.  Breathing unlabored.   Chest wall: Normal insp   Abdomen:  Soft, NT/ND, normal bowel sounds, no HSM, no masses.  No peritoneal signs.   Back: spine normal without deformity or tenderness.  Normal ROM   : Exam normal.  no inguinal hernias.  Extremities: No deformities, clubbing, cyanosis, or edema.  Musculoskeletal: Normal gait and station. No decreased range of motion, instability, atrophy or abnormal strength or tone in the head, neck, spine, ribs, pelvis or extremities.   Neurologic: CN 2-12 normal. Sensation to pain, touch and proprioception normal. DTRs normal in upper and lower extremities. No pathologic reflexes.  Motor normal.  Psychiatric: Oriented X3, intact recent and remote memory, judgement and insight, normal mood and affect.                          8.8    7.48  )-----------( 90       ( 29 Apr 2025 05:24 )             28.1     04-29    141  |  112[H]  |  27[H]  ----------------------------<  216[H]  4.1   |  23  |  1.45[H]    Ca    9.3      29 Apr 2025 05:24  Phos  3.3     04-29  Mg     2.2     04-29    TPro  5.9[L]  /  Alb  2.7[L]  /  TBili  1.4[H]  /  DBili  x   /  AST  32  /  ALT  17  /  AlkPhos  107  04-29    CAPILLARY BLOOD GLUCOSE          Urinalysis Basic - ( 29 Apr 2025 05:24 )    Color: x / Appearance: x / SG: x / pH: x  Gluc: 216 mg/dL / Ketone: x  / Bili: x / Urobili: x   Blood: x / Protein: x / Nitrite: x   Leuk Esterase: x / RBC: x / WBC x   Sq Epi: x / Non Sq Epi: x / Bacteria: x          Radiology/Imaging, I have personally reviewed:    Assessment and Plan:       Patient is a 79y old  Male who presents with a chief complaint of GI bleed (29 Apr 2025 13:27)    HPI and hospital course:   78yo male with PMHx of recent diagnosis of A-fib (not on AC), HTN, liver cirrhosis (dx March 2022 from agent orange?) hepatic encephalopathy, hernia x3, recent admitted April 3rd- 9th, 2025 for UGIB 2/2 esophageal varies s/p banding and COVID, now brought in by EMS to the ED with daughter c/o "vomiting blood" (last episode was 7:00-8:00am this morning) and rectal bleeding since last night after eating pepperoni during dinner. Daughter states that patient started vomiting and having maroon stools around 4am this morning, multiple episodes.     In ED /70s, HR 90s. pt had dark red BM in ED and feeling nauseous but no episodes vomiting. states he feels lightheaded with some abd pain in LLQ. CT A/P showing no active bleed but blood in colon, admitted to CCU for close monitoring and further management, s/p 4u pRBC, 1FFP, 1plt during hospital course thus far, EGD 4/26 w/ esophagitis but no bleeding. Colonoscopy 4/28 negative. Of note PPM interrogated during this admission showing multiple episodes of VT/VF. Patient was seen by cardiology and EP recommended switching coreg to propanolol 40 mg q8 hours. TTE showed EF 60-65% with left atrium severely dilated mild LVH. Patient medically stable for downgrade form CCU.       PAST MEDICAL & SURGICAL HISTORY:  HTN (hypertension)      Hepatic encephalopathy      Liver cirrhosis      Liver cirrhosis        FAMILY HISTORY:  FH: HTN (hypertension)      Social History:  denies any current alcohol use or smoking     Allergies    Mushrooms (Anaphylaxis (Severe))  penicillin (Rash)    Intolerances        MEDICATIONS  (STANDING):  chlorhexidine 2% Cloths 1 Application(s) Topical <User Schedule>  fluticasone propionate/ salmeterol 500-50 MICROgram(s) Diskus 1 Dose(s) Inhalation two times a day  furosemide    Tablet 40 milliGRAM(s) Oral daily  lactulose Syrup 20 Gram(s) Oral daily  montelukast 10 milliGRAM(s) Oral at bedtime  nystatin    Suspension 719336 Unit(s) Swish and Swallow four times a day  pantoprazole  Injectable 40 milliGRAM(s) IV Push daily  propranolol 40 milliGRAM(s) Oral every 8 hours  rifAXIMin 550 milliGRAM(s) Oral two times a day  spironolactone 50 milliGRAM(s) Oral daily    MEDICATIONS  (PRN):      ROS:  General:  No fevers, chills, or unexplained weight loss  Skin: No rash or bothersome skin lesions  Musculoskeletal: No arthalgias, myalgias or joint swelling  Eyes: No visual changes or eye pain  Ears: No hearing loss , otorrhea or ear pain  Nose, Mouth, Throat: No nasal congestion, rhinorrhea, oral lesions, postnasal drip or sore throat  Cardio: No chest pain or palpitations. no lower extremity edema. no syncope. no claudication.   Respiratory: No cough, shortness of breath or wheezing   GI: +vomiting  : No urinary frequency, hematuria, incontinence, or dysuria  Neurologic: No headaches, parasthesias, confusion, dysarthria or gait instability  Psychiatric:  No anxiety or depression  Lymphatic:  No easy bruising, easy bleeding or swollen glands  Allergic: No itching, sneezing , watery eyes, clear rhinorrhea or recurrent infections    PEx  T(C): 36.2 (04-29-25 @ 10:54), Max: 37.3 (04-28-25 @ 23:31)  HR: 81 (04-29-25 @ 13:00) (60 - 95)  BP: 116/60 (04-29-25 @ 13:00) (105/87 - 137/55)  RR: 17 (04-29-25 @ 13:00) (12 - 26)  SpO2: 94% (04-29-25 @ 13:00) (94% - 100%)  Wt(kg): --  General:     no acute distress, no identifying marks , scars, or tattoos.  Skin: no rash or prominent lesions  Head: normocephalic, atraumatic     Sinuses: non-tender  Nose: no external lesions, mucosa non-inflamed, septum and turbinates normal  Throat: no erythema, exudates or lesions.  Neck: Supple without lymphadenopathy. Thyroid no thyromegaly, no palpable thyroid nodules, no palpable nodules or masses, carotid arteries no bruits.   Heart: RRR, no murmur or gallop.  Normal S1, S2.  No S3, S4.   Lungs: CTA bilaterally, no wheezes, rhonchi, rales.  Breathing unlabored.   Chest wall: Normal insp   Abdomen:  Soft, distended non tender   Extremities: No deformities, clubbing, cyanosis, or edema.  Musculoskeletal: Normal gait and station. No decreased range of motion, instability, atrophy or abnormal strength or tone in the head, neck, spine, ribs, pelvis or extremities.   Neurologic: CN 2-12 normal. Sensation to pain, touch and proprioception normal. DTRs normal in upper and lower extremities. No pathologic reflexes.  Motor normal.  Psychiatric: Oriented X3, intact recent and remote memory, judgement and insight, normal mood and affect.                          8.8    7.48  )-----------( 90       ( 29 Apr 2025 05:24 )             28.1     04-29    141  |  112[H]  |  27[H]  ----------------------------<  216[H]  4.1   |  23  |  1.45[H]    Ca    9.3      29 Apr 2025 05:24  Phos  3.3     04-29  Mg     2.2     04-29    TPro  5.9[L]  /  Alb  2.7[L]  /  TBili  1.4[H]  /  DBili  x   /  AST  32  /  ALT  17  /  AlkPhos  107  04-29    CAPILLARY BLOOD GLUCOSE          Urinalysis Basic - ( 29 Apr 2025 05:24 )    Color: x / Appearance: x / SG: x / pH: x  Gluc: 216 mg/dL / Ketone: x  / Bili: x / Urobili: x   Blood: x / Protein: x / Nitrite: x   Leuk Esterase: x / RBC: x / WBC x   Sq Epi: x / Non Sq Epi: x / Bacteria: x          Radiology/Imaging, I have personally reviewed:   CTA   Cirrhosis.  Moderate volume of ascites.    Other findings as discussed above.

## 2025-04-29 NOTE — CONSULT NOTE ADULT - SUBJECTIVE AND OBJECTIVE BOX
CHIEF COMPLAINT: hematemesis       HPI:  78 yo male with a hx Afib not on AC, AS s/p TAVR 2/2025 and PPM at [facility], HTN, cirrhosis, hepatic encephalopathy, recent admission from 4/3-4/9 for UGIB due to esophageal varices brought in to ED with c/o vomiting blood.     EGD and colonoscopy did not show any evidence of active GI bleed.     EP consulted for device interrogation which showed several episodes of VT.     Spoke with daughter, Nano - pt had TAVR recently and reportedly normal cardiac cath prior to that.     Pt denies chest pain, SOB, LE edema, dizziness, palpitations.       PAST MEDICAL / SURGICAL HISTORY:  PAST MEDICAL & SURGICAL HISTORY:  HTN (hypertension)      Hepatic encephalopathy      Liver cirrhosis      Liver cirrhosis          SOCIAL HISTORY:   Alcohol: Denied  Smoking: Nonsmoker  Drug Use: Denied  Marital Status:     FAMILY HISTORY: FAMILY HISTORY:  FH: HTN (hypertension)        MEDICATIONS  (STANDING):  chlorhexidine 2% Cloths 1 Application(s) Topical <User Schedule>  fluticasone propionate/ salmeterol 500-50 MICROgram(s) Diskus 1 Dose(s) Inhalation two times a day  furosemide    Tablet 40 milliGRAM(s) Oral daily  lactulose Syrup 20 Gram(s) Oral daily  montelukast 10 milliGRAM(s) Oral at bedtime  nystatin    Suspension [id number] Unit(s) Swish and Swallow four times a day  pantoprazole  Injectable 40 milliGRAM(s) IV Push daily  propranolol 40 milliGRAM(s) Oral every 8 hours  rifAXIMin 550 milliGRAM(s) Oral two times a day  spironolactone 50 milliGRAM(s) Oral daily    MEDICATIONS  (PRN):      Allergies    Mushrooms (Anaphylaxis (Severe))  penicillin (Rash)    Intolerances        REVIEW OF SYSTEMS:  CONSTITUTIONAL: No weakness, fevers or chills  Eyes: No visual changes  NECK: No pain or stiffness  RESPIRATORY: No cough, wheezing, hemoptysis; No shortness of breath  CARDIOVASCULAR: No chest pain or palpitations  GASTROINTESTINAL: No abdominal pain. +hematemesis  constipation. No melena or hematochezia.  GENITOURINARY: No dysuria, frequency or hematuria  NEUROLOGICAL: No numbness.  SKIN: No itching or rash  All other review of systems is negative unless indicated above    VITAL SIGNS:   Vital Signs Last 24 Hrs  T(C): 36.4 (29 Apr 2025 05:00), Max: 37.3 (28 Apr 2025 23:31)  T(F): 97.5 (29 Apr 2025 05:00), Max: 99.2 (28 Apr 2025 23:31)  HR: 81 (29 Apr 2025 13:00) (60 - 95)  BP: 116/60 (29 Apr 2025 13:00) (105/87 - 137/55)  BP(mean): 77 (29 Apr 2025 13:00) (70 - 108)  RR: 17 (29 Apr 2025 13:00) (12 - 26)  SpO2: 94% (29 Apr 2025 13:00) (94% - 100%)    Parameters below as of 29 Apr 2025 12:00  Patient On (Oxygen Delivery Method): room air        I&O's Summary    28 Apr 2025 07:01  -  29 Apr 2025 07:00  --------------------------------------------------------  IN: 0 mL / OUT: 1300 mL / NET: -1300 mL        PHYSICAL EXAM:  Constitutional: NAD, awake and alert  HEENT:  EOMI,  Pupils round, No oral cyanosis.  Pulmonary: Non-labored, breath sounds are clear bilaterally, No wheezing, rales or rhonchi  Cardiovascular: S1 and S2, regular rate and rhythm, no Murmurs, gallops or rubs  Gastrointestinal: Bowel Sounds present, soft, nontender.   Lymph: No peripheral edema. No cervical lymphadenopathy.  Neurological: Alert, no focal deficits  Skin: No rashes.  Psych:  Mood & affect appropriate    LABS:                        8.8    7.48  )-----------( 90       ( 29 Apr 2025 05:24 )             28.1                         9.1    6.67  )-----------( 79       ( 28 Apr 2025 06:02 )             27.5                         8.3    6.04  )-----------( 61       ( 27 Apr 2025 05:23 )             25.3     29 Apr 2025 05:24    141    |  112    |  27     ----------------------------<  216    4.1     |  23     |  1.45   28 Apr 2025 06:02    141    |  110    |  28     ----------------------------<  112    4.1     |  23     |  1.48   28 Apr 2025 02:26    140    |  107    |  30     ----------------------------<  123    4.1     |  22     |  1.49     Ca    9.3        29 Apr 2025 05:24  Ca    9.0        28 Apr 2025 06:02  Ca    9.3        28 Apr 2025 02:26  Phos  3.3       29 Apr 2025 05:24  Phos  3.0       28 Apr 2025 06:02  Phos  3.8       27 Apr 2025 05:23  Mg     2.2       29 Apr 2025 05:24  Mg     1.7       28 Apr 2025 06:02  Mg     1.7       28 Apr 2025 02:26    TPro  5.9    /  Alb  2.7    /  TBili  1.4    /  DBili  x      /  AST  32     /  ALT  17     /  AlkPhos  107    29 Apr 2025 05:24  TPro  5.9    /  Alb  2.9    /  TBili  2.1    /  DBili  x      /  AST  43     /  ALT  22     /  AlkPhos  82     28 Apr 2025 06:02  TPro  6.0    /  Alb  2.9    /  TBili  2.4    /  DBili  x      /  AST  38     /  ALT  20     /  AlkPhos  87     27 Apr 2025 05:23        < from: TTE W or WO Ultrasound Enhancing Agent (04.28.25 @ 15:50) >  CONCLUSIONS:      1. Left ventricular systolic function is normal with an ejection fraction visually estimated at 60 to 65 %.   2. Left atrium is severely dilated.   3. Mild left ventricular hypertrophy.   4. A a transcatheter deployed (TAVR) is present in the aortic positionThe peak transaortic velocity is 2.59 m/s, peak transaortic gradient is 26.8 mmHg and mean transaortic gradient is 14.0 mmHg with an LVOT/aortic valve VTI ratio of 0.42.    < end of copied text >

## 2025-04-29 NOTE — PHYSICAL THERAPY INITIAL EVALUATION ADULT - PERTINENT HX OF CURRENT PROBLEM, REHAB EVAL
Pt admitted to  secondary to hematemesis and rectal bleeding. Pt received 4 units of PRBC's, 1 FFP, and 1 Plt. Pt s/p EGD 4/25 showing esophagitis. Pt s/p colonoscopy 4/28/2025. PPM interrogation showed multiple episodes of VT/VF. H/h- 8.8/28.1. Pt admitted to  secondary to hematemesis and rectal bleeding. Pt received 4 units of PRBC's, 1 FFP, and 1 Plt. Pt s/p EGD 4/25 showing esophagitis. Pt s/p colonoscopy 4/28/2025. PPM interrogation showed multiple episodes of VT/VF. H/H- 8.8/28.1.

## 2025-04-30 LAB
ALBUMIN SERPL ELPH-MCNC: 2.6 G/DL — LOW (ref 3.3–5)
ALP SERPL-CCNC: 101 U/L — SIGNIFICANT CHANGE UP (ref 40–120)
ALT FLD-CCNC: 17 U/L — SIGNIFICANT CHANGE UP (ref 12–78)
ANION GAP SERPL CALC-SCNC: 4 MMOL/L — LOW (ref 5–17)
AST SERPL-CCNC: 30 U/L — SIGNIFICANT CHANGE UP (ref 15–37)
BILIRUB SERPL-MCNC: 1.3 MG/DL — HIGH (ref 0.2–1.2)
BUN SERPL-MCNC: 30 MG/DL — HIGH (ref 7–23)
CALCIUM SERPL-MCNC: 9 MG/DL — SIGNIFICANT CHANGE UP (ref 8.5–10.1)
CHLORIDE SERPL-SCNC: 112 MMOL/L — HIGH (ref 96–108)
CO2 SERPL-SCNC: 25 MMOL/L — SIGNIFICANT CHANGE UP (ref 22–31)
CREAT SERPL-MCNC: 1.13 MG/DL — SIGNIFICANT CHANGE UP (ref 0.5–1.3)
EGFR: 66 ML/MIN/1.73M2 — SIGNIFICANT CHANGE UP
EGFR: 66 ML/MIN/1.73M2 — SIGNIFICANT CHANGE UP
GLUCOSE SERPL-MCNC: 130 MG/DL — HIGH (ref 70–99)
HCT VFR BLD CALC: 27.5 % — LOW (ref 39–50)
HGB BLD-MCNC: 8.7 G/DL — LOW (ref 13–17)
IMMATURE PLATELET FRACTION #: 2.8 K/UL — LOW (ref 3.9–12.5)
IMMATURE PLATELET FRACTION %: 3.1 % — SIGNIFICANT CHANGE UP (ref 1.6–7.1)
MAGNESIUM SERPL-MCNC: 1.7 MG/DL — SIGNIFICANT CHANGE UP (ref 1.6–2.6)
MCHC RBC-ENTMCNC: 30.9 PG — SIGNIFICANT CHANGE UP (ref 27–34)
MCHC RBC-ENTMCNC: 31.6 G/DL — LOW (ref 32–36)
MCV RBC AUTO: 97.5 FL — SIGNIFICANT CHANGE UP (ref 80–100)
NRBC # BLD AUTO: 0 K/UL — SIGNIFICANT CHANGE UP (ref 0–0)
NRBC # FLD: 0 K/UL — SIGNIFICANT CHANGE UP (ref 0–0)
NRBC BLD AUTO-RTO: 0 /100 WBCS — SIGNIFICANT CHANGE UP (ref 0–0)
PHOSPHATE SERPL-MCNC: 2.9 MG/DL — SIGNIFICANT CHANGE UP (ref 2.5–4.5)
PLATELET # BLD AUTO: 91 K/UL — LOW (ref 150–400)
PMV BLD: 11.5 FL — SIGNIFICANT CHANGE UP (ref 7–13)
POTASSIUM SERPL-MCNC: 4.3 MMOL/L — SIGNIFICANT CHANGE UP (ref 3.5–5.3)
POTASSIUM SERPL-SCNC: 4.3 MMOL/L — SIGNIFICANT CHANGE UP (ref 3.5–5.3)
PROT SERPL-MCNC: 5.7 GM/DL — LOW (ref 6–8.3)
RBC # BLD: 2.82 M/UL — LOW (ref 4.2–5.8)
RBC # FLD: 22.7 % — HIGH (ref 10.3–14.5)
SODIUM SERPL-SCNC: 141 MMOL/L — SIGNIFICANT CHANGE UP (ref 135–145)
WBC # BLD: 7.04 K/UL — SIGNIFICANT CHANGE UP (ref 3.8–10.5)
WBC # FLD AUTO: 7.04 K/UL — SIGNIFICANT CHANGE UP (ref 3.8–10.5)

## 2025-04-30 PROCEDURE — 99232 SBSQ HOSP IP/OBS MODERATE 35: CPT

## 2025-04-30 PROCEDURE — 93280 PM DEVICE PROGR EVAL DUAL: CPT | Mod: 26

## 2025-04-30 PROCEDURE — 99233 SBSQ HOSP IP/OBS HIGH 50: CPT

## 2025-04-30 PROCEDURE — 71045 X-RAY EXAM CHEST 1 VIEW: CPT | Mod: 26

## 2025-04-30 RX ORDER — CARVEDILOL 3.12 MG/1
1 TABLET, FILM COATED ORAL
Refills: 0 | DISCHARGE

## 2025-04-30 RX ORDER — PROPRANOLOL HCL 60 MG
1 TABLET ORAL
Qty: 90 | Refills: 0
Start: 2025-04-30 | End: 2025-05-29

## 2025-04-30 RX ORDER — MAGNESIUM SULFATE 500 MG/ML
2 SYRINGE (ML) INJECTION ONCE
Refills: 0 | Status: COMPLETED | OUTPATIENT
Start: 2025-04-30 | End: 2025-04-30

## 2025-04-30 RX ORDER — ACETAMINOPHEN 500 MG/5ML
650 LIQUID (ML) ORAL EVERY 6 HOURS
Refills: 0 | Status: DISCONTINUED | OUTPATIENT
Start: 2025-04-30 | End: 2025-05-02

## 2025-04-30 RX ADMIN — LACTULOSE 20 GRAM(S): 10 SOLUTION ORAL at 10:16

## 2025-04-30 RX ADMIN — MONTELUKAST SODIUM 10 MILLIGRAM(S): 10 TABLET ORAL at 21:08

## 2025-04-30 RX ADMIN — Medication 1 APPLICATION(S): at 06:11

## 2025-04-30 RX ADMIN — Medication 25 GRAM(S): at 11:49

## 2025-04-30 RX ADMIN — Medication 500000 UNIT(S): at 01:50

## 2025-04-30 RX ADMIN — Medication 500000 UNIT(S): at 06:08

## 2025-04-30 RX ADMIN — Medication 500000 UNIT(S): at 23:06

## 2025-04-30 RX ADMIN — Medication 50 MILLIGRAM(S): at 15:03

## 2025-04-30 RX ADMIN — Medication 1 DOSE(S): at 20:17

## 2025-04-30 RX ADMIN — Medication 500000 UNIT(S): at 17:42

## 2025-04-30 RX ADMIN — FUROSEMIDE 40 MILLIGRAM(S): 10 INJECTION INTRAMUSCULAR; INTRAVENOUS at 10:16

## 2025-04-30 RX ADMIN — Medication 1 DOSE(S): at 08:39

## 2025-04-30 RX ADMIN — Medication 650 MILLIGRAM(S): at 11:49

## 2025-04-30 RX ADMIN — Medication 40 MILLIGRAM(S): at 10:17

## 2025-04-30 RX ADMIN — Medication 650 MILLIGRAM(S): at 12:30

## 2025-04-30 NOTE — PROCEDURE NOTE - ADDITIONAL PROCEDURE DETAILS
Battery status good 10.4 yrs.  Normal dual PPM function.  Magnesium replacement given for Mg 1.7 today.
Dual chamber pacemaker with normal function, adequate pacing thresholds.  Stored data with ongoing episode of AF, AF burden 3.3%, multiple episodes of VT/VF.  Full list in chart. Recommend ischemia evaluation.  Device is followed at [facility].  Will attempt to get outside records.

## 2025-04-30 NOTE — PROGRESS NOTE ADULT - SUBJECTIVE AND OBJECTIVE BOX
HPI: 80yo male with PMHx of recent diagnosis of A-fib (not on AC), HTN, liver cirrhosis (dx March 2022 from agent orange?) hepatic encephalopathy, hernia x3, recent admitted April 3rd- 9th, 2025 for UGIB 2/2 esophageal varies s/p banding and COVID, now brought in by EMS to the ED with daughter c/o "vomiting blood" (last episode was 7:00-8:00am this morning) and rectal bleeding since last night after eating pepperoni during dinner. Daughter states that patient started vomiting and having maroon stools around 4am this morning, multiple episodes.     In ED /70s, HR 90s. pt had dark red BM in ED and feeling nauseous but no episodes vomiting. states he feels lightheaded with some abd pain in LLQ. appears pale  Daughter meeta at bedside provided history.  (26 Apr 2025 12:27)    04/30/25: EPS follow-up, pt. transitioned from Coreg to Propranolol yesterday. PPM interrogation from 04/28 showed multiple 1 second episodes of VT/VF. Pt. tolerating propranolol with no complaints. Pt. was seated in recliner and had a sudden onset of nausea and pain r/t positioning with hernia. Telemetry strip reviewed - likely artifact. PPM interrogation checked - no further VT arrhythmias, some PAF. Pt. reports discomfort only at hernia sites, but no other general complaints. Pt. in bed comfortable, denies chest pain, n/v/d, sob.     EKG: V-Paced. Vent. Rate: 68 bpm, Atrial rate: 72 bpm, QRS duration: 148 ms, QT interval: 484ms, QTc: 514ms.  Tele: V-Paced 70s        PAST MEDICAL & SURGICAL HISTORY:  HTN (hypertension)  Hepatic encephalopathy  Liver cirrhosis  Liver cirrhosis      MEDICATIONS  (STANDING):  chlorhexidine 2% Cloths 1 Application(s) Topical <User Schedule>  fluticasone propionate/ salmeterol 500-50 MICROgram(s) Diskus 1 Dose(s) Inhalation two times a day  furosemide    Tablet 40 milliGRAM(s) Oral daily  lactulose Syrup 20 Gram(s) Oral daily  montelukast 10 milliGRAM(s) Oral at bedtime  nystatin    Suspension [id number] Unit(s) Swish and Swallow four times a day  pantoprazole  Injectable 40 milliGRAM(s) IV Push daily  propranolol 40 milliGRAM(s) Oral every 8 hours  rifAXIMin 550 milliGRAM(s) Oral two times a day  spironolactone 50 milliGRAM(s) Oral daily    MEDICATIONS  (PRN):  acetaminophen     Tablet .. 650 milliGRAM(s) Oral every 6 hours PRN Moderate Pain (4 - 6)    Allergies    Mushrooms (Anaphylaxis (Severe))  penicillin (Rash)      SOCIAL HISTORY: Denies tobacco, etoh abuse or illicit drug use    FAMILY HISTORY:  FH: HTN (hypertension)      Vital Signs Last 24 Hrs  T(C): 36.3 (30 Apr 2025 11:34), Max: 36.6 (30 Apr 2025 05:50)  T(F): 97.3 (30 Apr 2025 11:34), Max: 97.9 (30 Apr 2025 05:50)  HR: 71 (30 Apr 2025 08:41) (66 - 84)  BP: 118/62 (30 Apr 2025 08:00) (109/57 - 144/65)  BP(mean): 79 (30 Apr 2025 08:00) (73 - 88)  RR: 20 (30 Apr 2025 08:00) (11 - 20)  SpO2: 100% (30 Apr 2025 08:41) (93% - 100%)    Parameters below as of 30 Apr 2025 08:41  Patient On (Oxygen Delivery Method): nasal cannula, 2l      REVIEW OF SYSTEMS:    CONSTITUTIONAL:  As per HPI. Reports tenderness with positioning of hernias in the groin.   HEENT:  Eyes:  No diplopia or blurred vision. ENT:  No earache, sore throat or runny nose.  CARDIOVASCULAR:  No pressure, squeezing, strangling, tightness, heaviness or aching about the chest, neck, axilla or epigastrium.  RESPIRATORY:  No cough, shortness of breath, PND or orthopnea.  GASTROINTESTINAL:  No nausea, vomiting or diarrhea.  GENITOURINARY:  No dysuria, frequency or urgency.  MUSCULOSKELETAL:  As per HPI.  SKIN:  No change in skin, hair or nails.   NEUROLOGIC:  No paresthesias, fasciculations, seizures or weakness.  PSYCHIATRIC:  No disorder of thought or mood.  ENDOCRINE:  No heat or cold intolerance, polyuria or polydipsia.  HEMATOLOGICAL:  No easy bruising or bleedings:    PHYSICAL EXAMINATION:    GENERAL APPEARANCE:  Pt. is not currently dyspneic, in no distress. Pt. is alert, oriented, and pleasant.  HEENT: PERRLA. No icterus. Moist mucous membranes.  NECK:  Supple. No lymphadenopathy. Jugular venous pressure not elevated. Carotids equal.   HEART:  RRR. The cardiac impulse has a normal quality. There are no murmurs, rubs or gallops noted  CHEST:  Lung sounds are coarse on exhalation lower lung fields. Normal respiratory effort.  ABDOMEN:  Soft, distended, non-tender. Tender inguinal hernia x3 in lower abdomen into groin. Hyperactive bowel sounds on auscultation. Non-tender on palpation.   EXTREMITIES:  +2 pitting bilateral LE edema. +2 Pulses palpable in all extremities.   SKIN:  Generalized ecchymosis, jenn LE.     I&O's Summary    29 Apr 2025 07:01  -  30 Apr 2025 07:00  --------------------------------------------------------  IN: 600 mL / OUT: 1225 mL / NET: -625 mL      LABS:                        8.7    7.04  )-----------( 91       ( 30 Apr 2025 05:56 )             27.5     04-30    141  |  112[H]  |  30[H]  ----------------------------<  130[H]  4.3   |  25  |  1.13    Ca    9.0      30 Apr 2025 05:56  Phos  2.9     04-30  Mg     1.7     04-30    TPro  5.7[L]  /  Alb  2.6[L]  /  TBili  1.3[H]  /  DBili  x   /  AST  30  /  ALT  17  /  AlkPhos  101  04-30    LIVER FUNCTIONS - ( 30 Apr 2025 05:56 )  Alb: 2.6 g/dL / Pro: 5.7 gm/dL / ALK PHOS: 101 U/L / ALT: 17 U/L / AST: 30 U/L / GGT: x         Urinalysis Basic - ( 30 Apr 2025 05:56 )  Color: x / Appearance: x / SG: x / pH: x  Gluc: 130 mg/dL / Ketone: x  / Bili: x / Urobili: x   Blood: x / Protein: x / Nitrite: x   Leuk Esterase: x / RBC: x / WBC x   Sq Epi: x / Non Sq Epi: x / Bacteria: x      < from: CT Abdomen and Pelvis w/wo IV Cont (04.26.25 @ 11:44) >  IMPRESSION:    High attenuation intraluminal contents within the colon is suggestive of   hemorrhagic products.  No CT evidence for active gastrointestinal bleeding.    < end of copied text >  < from: Xray Chest 1 View- PORTABLE-Urgent (Xray Chest 1 View- PORTABLE-Urgent .) (04.26.25 @ 11:03) >  IMPRESSION:  Cardiomegaly with single lead pacemaker and prior   transaortic valve replacement again noted unchanged. Lungs are now clear   with residual diffuse increased markings. Angles sharp. Bones without   acute abnormality.    < end of copied text >    < from: TTE W or WO Ultrasound Enhancing Agent (04.28.25 @ 15:50) >  CONCLUSIONS:      1. Left ventricular systolic function is normal with an ejection fraction visually estimated at 60 to 65 %.   2. Left atrium is severely dilated.   3. Mild left ventricular hypertrophy.   4. A a transcatheter deployed (TAVR) is present in the aortic positionThe peak transaortic velocity is 2.59 m/s, peak transaortic gradient is 26.8 mmHg and mean transaortic gradient is 14.0 mmHg with an LVOT/aortic valve VTI ratio of 0.42.    < end of copied text >  < from: TTE W or WO Ultrasound Enhancing Agent (04.28.25 @ 15:50) >  FINDINGS:     Left Ventricle:  The left ventricular cavity is normal in size. Left ventricular wall thickness is mildly increased. Left ventricular systolic function is normal with an ejection fraction visually estimated at 60 to 65%. Mild left ventricular hypertrophy. The left ventricular diastolic function is indeterminate.     Right Ventricle:  The right ventricular cavity is normal in size and right ventricular systolic function is normal. Tricuspid annular plane systolic excursion (TAPSE) is 2.2 cm (normal >=1.7 cm). A device lead is visualized.     Left Atrium:  The left atrium is severely dilated with an indexed volume of 60.46 ml/m².     Right Atrium:  The right atrium is normal in size with an indexed volume of 29.62 ml/m² and an indexed area of 11.12 cm²/m². There is a device lead seen in the right atrium.     Interatrial Septum:  The interatrial septum appears intact.     Aortic Valve:  A a transcatheter deployed (TAVR) is present in the aortic position. The prosthetic valve is well seated with normal function. The peak transaortic velocity is 2.59 m/s, peak transaortic gradient is 26.8 mmHg and mean transaortic gradient is 14.0 mmHg with an LVOT/aortic valve VTI ratio of 0.42.     Mitral Valve:  There is mitral valve thickening of the anterior and posterior leaflets. There is reduced leaflet mobility of the mitral valve. There is moderate calcification of the mitral valve annulus. There is moderate mitral valve stenosis. The transmitral peak gradient is 34.1 mmHg and mean gradient is 8.00 mmHg. There is moderate mitral regurgitation.     Tricuspid Valve:  Structurally normal tricuspid valve with normal leaflet excursion. There is mild to moderate tricuspid regurgitation. Estimated pulmonary artery systolic pressure is 77 mmHg, consistent with severe pulmonary hypertension.     Pulmonic Valve:  Structurally normal pulmonic valve with normal leaflet excursion. There is mild pulmonic regurgitation.     Aorta:  The aortic root at the sinuses of Valsalva is normal in size, measuring 3.50 cm (indexed 1.79 cm/m²). The ascending aorta is normal in size, measuring 3.00 cm (indexed 1.54 cm/m²).     Pericardium:  No pericardial effusion seen.     Systemic Veins:  The inferior vena cava is dilated measuring 2.50 cm in diameter, (dilated >2.1cm) with abnormal inspiratory collapse (abnormal <50%) consistent with elevated right atrial pressure (~15, range 10-20mmHg).    < end of copied text >  < from: Colonoscopy (04.28.25 @ 14:02) >   - Diverticulosis in the sigmoid colon and in the     IMPRESS                      descending colon.    IMPRESS                      - The examination was otherwise wes    < end of copied text >

## 2025-04-30 NOTE — PROVIDER CONTACT NOTE (CHANGE IN STATUS NOTIFICATION) - SITUATION
Pt with sudden bout of nausea. OOB to BR, hypotensive sys 80s. Complaints of scrotal hernia pain
Pt came in for melena, prepped for colonoscopy
Pt came in for melena , Prepping for colonoscopy in the morning.

## 2025-04-30 NOTE — PROCEDURE NOTE - INTERROGATION NOTE: COMMENTS
He was in PAF 4/27-4/28, AF burden = 29.1 %.  Brief PAFs on 4/29.   No further ventricular arrhythmias noted.

## 2025-04-30 NOTE — PROGRESS NOTE ADULT - SUBJECTIVE AND OBJECTIVE BOX
CHIEF COMPLAINT: hematemesis       HPI:  78 yo male with a hx Afib not on AC, AS s/p TAVR 2/2025 and PPM at [facility], HTN, cirrhosis, hepatic encephalopathy, recent admission from 4/3-4/9 for UGIB due to esophageal varices brought in to ED with c/o vomiting blood.     EGD and colonoscopy did not show any evidence of active GI bleed.     EP consulted for device interrogation which showed several episodes of VT.     Spoke with daughter, Nano - pt had TAVR recently and reportedly normal cardiac cath prior to that.     Pt denies chest pain, SOB, LE edema, dizziness, palpitations.     4/30 : pt feeling well without cardiac sx    PAST MEDICAL / SURGICAL HISTORY:  PAST MEDICAL & SURGICAL HISTORY:  HTN (hypertension)      Hepatic encephalopathy      Liver cirrhosis      Liver cirrhosis          SOCIAL HISTORY:   Alcohol: Denied  Smoking: Nonsmoker  Drug Use: Denied  Marital Status:     FAMILY HISTORY: FAMILY HISTORY:  FH: HTN (hypertension)        MEDICATIONS  (STANDING):  chlorhexidine 2% Cloths 1 Application(s) Topical <User Schedule>  fluticasone propionate/ salmeterol 500-50 MICROgram(s) Diskus 1 Dose(s) Inhalation two times a day  furosemide    Tablet 40 milliGRAM(s) Oral daily  lactulose Syrup 20 Gram(s) Oral daily  montelukast 10 milliGRAM(s) Oral at bedtime  nystatin    Suspension [id number] Unit(s) Swish and Swallow four times a day  pantoprazole  Injectable 40 milliGRAM(s) IV Push daily  propranolol 40 milliGRAM(s) Oral every 8 hours  rifAXIMin 550 milliGRAM(s) Oral two times a day  spironolactone 50 milliGRAM(s) Oral daily    MEDICATIONS  (PRN):      Allergies    Mushrooms (Anaphylaxis (Severe))  penicillin (Rash)    Intolerances        REVIEW OF SYSTEMS:  CONSTITUTIONAL: No weakness, fevers or chills  Eyes: No visual changes  NECK: No pain or stiffness  RESPIRATORY: No cough, wheezing, hemoptysis; No shortness of breath  CARDIOVASCULAR: No chest pain or palpitations  GASTROINTESTINAL: No abdominal pain. +hematemesis  constipation. No melena or hematochezia.  GENITOURINARY: No dysuria, frequency or hematuria  NEUROLOGICAL: No numbness.  SKIN: No itching or rash  All other review of systems is negative unless indicated above    VITAL SIGNS:   Vital Signs Last 24 Hrs  T(C): 36.4 (29 Apr 2025 05:00), Max: 37.3 (28 Apr 2025 23:31)  T(F): 97.5 (29 Apr 2025 05:00), Max: 99.2 (28 Apr 2025 23:31)  HR: 81 (29 Apr 2025 13:00) (60 - 95)  BP: 116/60 (29 Apr 2025 13:00) (105/87 - 137/55)  BP(mean): 77 (29 Apr 2025 13:00) (70 - 108)  RR: 17 (29 Apr 2025 13:00) (12 - 26)  SpO2: 94% (29 Apr 2025 13:00) (94% - 100%)    Parameters below as of 29 Apr 2025 12:00  Patient On (Oxygen Delivery Method): room air        I&O's Summary    28 Apr 2025 07:01  -  29 Apr 2025 07:00  --------------------------------------------------------  IN: 0 mL / OUT: 1300 mL / NET: -1300 mL        PHYSICAL EXAM:  Constitutional: NAD, awake and alert  HEENT:  EOMI,  Pupils round, No oral cyanosis.  Pulmonary: Non-labored, breath sounds are clear bilaterally, No wheezing, rales or rhonchi  Cardiovascular: S1 and S2, regular rate and rhythm, no Murmurs, gallops or rubs  Gastrointestinal: Bowel Sounds present, soft, nontender.   Lymph: No peripheral edema. No cervical lymphadenopathy.  Neurological: Alert, no focal deficits  Skin: No rashes.  Psych:  Mood & affect appropriate    LABS:                        8.8    7.48  )-----------( 90       ( 29 Apr 2025 05:24 )             28.1                         9.1    6.67  )-----------( 79       ( 28 Apr 2025 06:02 )             27.5                         8.3    6.04  )-----------( 61       ( 27 Apr 2025 05:23 )             25.3     29 Apr 2025 05:24    141    |  112    |  27     ----------------------------<  216    4.1     |  23     |  1.45   28 Apr 2025 06:02    141    |  110    |  28     ----------------------------<  112    4.1     |  23     |  1.48   28 Apr 2025 02:26    140    |  107    |  30     ----------------------------<  123    4.1     |  22     |  1.49     Ca    9.3        29 Apr 2025 05:24  Ca    9.0        28 Apr 2025 06:02  Ca    9.3        28 Apr 2025 02:26  Phos  3.3       29 Apr 2025 05:24  Phos  3.0       28 Apr 2025 06:02  Phos  3.8       27 Apr 2025 05:23  Mg     2.2       29 Apr 2025 05:24  Mg     1.7       28 Apr 2025 06:02  Mg     1.7       28 Apr 2025 02:26    TPro  5.9    /  Alb  2.7    /  TBili  1.4    /  DBili  x      /  AST  32     /  ALT  17     /  AlkPhos  107    29 Apr 2025 05:24  TPro  5.9    /  Alb  2.9    /  TBili  2.1    /  DBili  x      /  AST  43     /  ALT  22     /  AlkPhos  82     28 Apr 2025 06:02  TPro  6.0    /  Alb  2.9    /  TBili  2.4    /  DBili  x      /  AST  38     /  ALT  20     /  AlkPhos  87     27 Apr 2025 05:23        < from: TTE W or WO Ultrasound Enhancing Agent (04.28.25 @ 15:50) >  CONCLUSIONS:      1. Left ventricular systolic function is normal with an ejection fraction visually estimated at 60 to 65 %.   2. Left atrium is severely dilated.   3. Mild left ventricular hypertrophy.   4. A a transcatheter deployed (TAVR) is present in the aortic positionThe peak transaortic velocity is 2.59 m/s, peak transaortic gradient is 26.8 mmHg and mean transaortic gradient is 14.0 mmHg with an LVOT/aortic valve VTI ratio of 0.42.    < end of copied text >

## 2025-04-30 NOTE — PROGRESS NOTE ADULT - SUBJECTIVE AND OBJECTIVE BOX
HOSPITALIST ATTENDING PROGRESS NOTE    Chart and meds reviewed.  Patient seen and examined.    Subjective: Planned to dc this am but pt become hypotensive due to pain given small fluid bolus ambulated with pt and desatted. unclear why, f/u cxr     All other systems reviewed and found to be negative with the exception of what has been described above.    MEDICATIONS  (STANDING):  chlorhexidine 2% Cloths 1 Application(s) Topical <User Schedule>  fluticasone propionate/ salmeterol 500-50 MICROgram(s) Diskus 1 Dose(s) Inhalation two times a day  furosemide    Tablet 40 milliGRAM(s) Oral daily  lactulose Syrup 20 Gram(s) Oral daily  montelukast 10 milliGRAM(s) Oral at bedtime  nystatin    Suspension [id number] Unit(s) Swish and Swallow four times a day  pantoprazole  Injectable 40 milliGRAM(s) IV Push daily  propranolol 40 milliGRAM(s) Oral every 8 hours  rifAXIMin 550 milliGRAM(s) Oral two times a day  spironolactone 50 milliGRAM(s) Oral daily    MEDICATIONS  (PRN):  acetaminophen     Tablet .. 650 milliGRAM(s) Oral every 6 hours PRN Moderate Pain (4 - 6)      VITALS:  T(F): 96.8 (04-30-25 @ 16:10), Max: 97.9 (04-30-25 @ 05:50)  HR: 63 (04-30-25 @ 16:00) (61 - 75)  BP: 92/65 (04-30-25 @ 16:00) (92/65 - 134/68)  RR: 10 (04-30-25 @ 16:00) (10 - 20)  SpO2: 93% (04-30-25 @ 16:00) (93% - 100%)  Wt(kg): --    I&O's Summary    29 Apr 2025 07:01  -  30 Apr 2025 07:00  --------------------------------------------------------  IN: 600 mL / OUT: 1225 mL / NET: -625 mL    30 Apr 2025 07:01  -  30 Apr 2025 17:00  --------------------------------------------------------  IN: 880 mL / OUT: 0 mL / NET: 880 mL        CAPILLARY BLOOD GLUCOSE          PHYSICAL EXAM:  General:     no acute distress, no identifying marks , scars, or tattoos.  Skin: no rash or prominent lesions  Head: normocephalic, atraumatic     Sinuses: non-tender  Nose: no external lesions, mucosa non-inflamed, septum and turbinates normal  Throat: no erythema, exudates or lesions.  Neck: Supple without lymphadenopathy. Thyroid no thyromegaly, no palpable thyroid nodules, no palpable nodules or masses, carotid arteries no bruits.   Heart: RRR, no murmur or gallop.  Normal S1, S2.  No S3, S4.   Lungs: CTA bilaterally, no wheezes, rhonchi, rales.  Breathing unlabored.   Chest wall: Normal insp   Abdomen:  Soft, distended non tender   Extremities: No deformities, clubbing, cyanosis, or edema.  Musculoskeletal: Normal gait and station. No decreased range of motion, instability, atrophy or abnormal strength or tone in the head, neck, spine, ribs, pelvis or extremities.   Neurologic: CN 2-12 normal. Sensation to pain, touch and proprioception normal. DTRs normal in upper and lower extremities. No pathologic reflexes.  Motor normal.  Psychiatric: Oriented X3, intact recent and remote memory, judgement and insight, normal mood and affect.    LABS:                            8.7    7.04  )-----------( 91       ( 30 Apr 2025 05:56 )             27.5     04-30    141  |  112[H]  |  30[H]  ----------------------------<  130[H]  4.3   |  25  |  1.13    Ca    9.0      30 Apr 2025 05:56  Phos  2.9     04-30  Mg     1.7     04-30    TPro  5.7[L]  /  Alb  2.6[L]  /  TBili  1.3[H]  /  DBili  x   /  AST  30  /  ALT  17  /  AlkPhos  101  04-30        LIVER FUNCTIONS - ( 30 Apr 2025 05:56 )  Alb: 2.6 g/dL / Pro: 5.7 gm/dL / ALK PHOS: 101 U/L / ALT: 17 U/L / AST: 30 U/L / GGT: x             Urinalysis Basic - ( 30 Apr 2025 05:56 )    Color: x / Appearance: x / SG: x / pH: x  Gluc: 130 mg/dL / Ketone: x  / Bili: x / Urobili: x   Blood: x / Protein: x / Nitrite: x   Leuk Esterase: x / RBC: x / WBC x   Sq Epi: x / Non Sq Epi: x / Bacteria: x              CULTURES:      Additional results/Imaging, I have personally reviewed:    Telemetry, personally reviewed: details…

## 2025-04-30 NOTE — PROGRESS NOTE ADULT - ASSESSMENT
79 year old male with PMH: PAF (not on AC), HTN, liver cirrhosis (dx March 2022 from agent orange?) hepatic encephalopathy, hernia x3, recent admission 04/03- 04/09 for UGIB 2/2 esophageal varies s/p banding and COVID, admitted with UGIB noted to have short frequent episodes of VT/VF in March.        Plan: VT/VF episodes on PPM interrogation   Continue telemetry monitoring  Pt. never on A/C for PAF d/t GIB  Patient is not a candidate for AAD with Amiodarone or Mexiletine given cirrhosis  TTE: Patient with normal LV function, EF 60-65%  Continue Propranolol 40mg PO q8h ( since it has additional antiarrythmic property compare to other beta blockers, transition to long acting upon discharge)  Maintain K>4, Mag >2  Patient downgraded to telemetry in CCU.  Cardiology consult appreciated.  Per Cardiology will medical manage. No ischemic w/u - last [facility] 01/30/25 pre-TAVR w/u at [facility] showing non-obstructive CAD.  Plan discussed with patient, hospitalist, CCU team and Dr. Beckman

## 2025-04-30 NOTE — PROGRESS NOTE ADULT - NS ATTEND AMEND GEN_ALL_CORE FT
Agreed with plan to continue propranolol. May transition to propranolol  mg daily upon dismissal. Follow up with his cardiologist/electrophysiologist at [facility].

## 2025-04-30 NOTE — PROGRESS NOTE ADULT - ASSESSMENT
78 yo male with a hx Afib not on AC, AS s/p TAVR 2/2025 and PPM at [facility], HTN, cirrhosis, hepatic encephalopathy, recent admission from 4/3-4/9 for UGIB due to esophageal varices brought in to ED with c/o vomiting blood.       Ventricular tachycardia  several episodes on device interrogation  seen by EP and started on propranolol - use of AAD limited by cirrhosis   per daughter pt had cath prior to TAVR in 2/2025- received records: 1/30/2025 cath showed no significant CAD   no chest pain, Hs trop negative . will cont to tx medically. no indication for cardiac cath at this time      Atrial fibrillation   not on AC in setting recurrent GI bleed   controlled on beta blocker     AS s/p TAVR   normal function on TTE       pt stable from cardiac standpoint- will sign off pleas reconsult as needed

## 2025-04-30 NOTE — PROCEDURE NOTE - NSTIMEOUT_GEN_A_CORE
Patient's first and last name, , procedure, and correct site confirmed prior to the start of procedure.
[facility]  [address]  [place] [address]  Phone: [phone]  Fax: [phone]  Follow Up Time:     [facility]  Dermatology  [address] [place] [address]  Phone: [phone]  Fax: [phone]  Follow Up Time:
Patient's first and last name, , procedure, and correct site confirmed prior to the start of procedure.

## 2025-04-30 NOTE — PROGRESS NOTE ADULT - ASSESSMENT
80yo male with PMHx of recent diagnosis of A-fib (not on AC), HTN, liver cirrhosis (dx March 2022 from agent orange?) hepatic encephalopathy, hernia x3, recent admitted April 3rd- 9th, 2025 for UGIB 2/2 esophageal varies s/p banding and COVID, now brought in by EMS to the ED with daughter c/o "vomiting blood" (last episode was 7:00-8:00am this morning) and rectal bleeding since last night after eating pepperoni during dinner. Daughter states that patient started vomiting and having maroon stools around 4am this morning, multiple episodes.     In ED /70s, HR 90s. pt had dark red BM in ED and feeling nauseous but no episodes vomiting. states he feels lightheaded with some abd pain in LLQ. CT A/P showing no active bleed but blood in colon, admitted to CCU for close monitoring and further management, s/p 4u pRBC, 1FFP, 1plt during hospital course thus far, EGD 4/26 w/ esophagitis but no bleeding. Colonoscopy 4/28 negative. Of note PPM interrogated during this admission showing multiple episodes of VT/VF. Patient was seen by cardiology and EP recommended switching coreg to propanolol 40 mg q8 hours. TTE showed EF 60-65% with left atrium severely dilated mild LVH. Patient medically stable for downgrade form CCU.     #Cirrhosis   #Esophageal bleeding   #Hepatic encephalopathy   #Ascites  Unclear cause of cirrhosis agent orange vs etoh use?   - s/p octreotide gtt   -s/p 4u pRBC, 1FFP, 1plt   -EGD 4/26 w/ esophagitis but no bleeding  - colonoscopy done 4/28 which was negative   - consider video capsule endoscopy outpatient   - GI consulted, appreciate recs   - cw protonix 40 mg daily   - advanced diet to regular   - trend H&H daily   - trend hepatic function daily   - maintain active type and screen   - resume home lasix 40 mg daily/adlactone 50 mg daily   - resume home lactulose 20 g daily rifaximin 550 mg bid for PSE     #Afib   #AS s/p TAVR  #PPM   #Multiple episode VT/VF   - not on AC for afib given GI bleed   - Echo this admission: EF 60 to 65 %. Left atrium is severely dilated.Mild left ventricular hypertrophy. A a transcatheter deployed (TAVR) is present in the aortic positionThe peak transaortic velocity is 2.59 m/s, peak transaortic gradient is 26.8 mmHg and mean transaortic gradient is 14.0 mmHg with an LVOT/aortic valve VTI ratio of 0.42.  - EP consulted appreciate recs  - Cardiology consulted appreciate recs   - pt not candidiate for AAD with amio or mexiletine given cirrhosis   - switch coreg to propanolol 40 mg po 18 hrs   - maintain K>4, Mag >2     #CKD   #BRENNAN on CKD  Patient with CKD baseline cr ~1.6 developed brennan likely prerenal 2/2 to gi bleed cr peaked at 2.04  - trend bmp function daily     #Acute hypoxemia?   Pt hypoxic today while ambulating with pt   - f/u cxr   - no clinical symptoms   - may need oxygen eval tomorrow     #Dispo: d/c home with home pt. Difficulty d/c given [facility]

## 2025-04-30 NOTE — PROVIDER CONTACT NOTE (CHANGE IN STATUS NOTIFICATION) - ASSESSMENT
Pt has had several BMs during the shift. getting back to bed pt had 8 episodes of vtach. denies any pain or palpitations.
Pt had 30+ beats of vtach, sleeping. denies any chest pain or SOB
Pt placed back in bed. Nausea and BP improved - sys BP now 111.

## 2025-05-01 ENCOUNTER — TRANSCRIPTION ENCOUNTER (OUTPATIENT)
Age: 80
End: 2025-05-01

## 2025-05-01 LAB
ANION GAP SERPL CALC-SCNC: 6 MMOL/L — SIGNIFICANT CHANGE UP (ref 5–17)
BUN SERPL-MCNC: 33 MG/DL — HIGH (ref 7–23)
CALCIUM SERPL-MCNC: 8.8 MG/DL — SIGNIFICANT CHANGE UP (ref 8.5–10.1)
CHLORIDE SERPL-SCNC: 109 MMOL/L — HIGH (ref 96–108)
CO2 SERPL-SCNC: 23 MMOL/L — SIGNIFICANT CHANGE UP (ref 22–31)
CREAT SERPL-MCNC: 1.04 MG/DL — SIGNIFICANT CHANGE UP (ref 0.5–1.3)
D DIMER BLD IA.RAPID-MCNC: 2175 NG/ML DDU — HIGH
EGFR: 73 ML/MIN/1.73M2 — SIGNIFICANT CHANGE UP
EGFR: 73 ML/MIN/1.73M2 — SIGNIFICANT CHANGE UP
GLUCOSE SERPL-MCNC: 128 MG/DL — HIGH (ref 70–99)
HCT VFR BLD CALC: 29.1 % — LOW (ref 39–50)
HGB BLD-MCNC: 9.3 G/DL — LOW (ref 13–17)
MCHC RBC-ENTMCNC: 31.1 PG — SIGNIFICANT CHANGE UP (ref 27–34)
MCHC RBC-ENTMCNC: 32 G/DL — SIGNIFICANT CHANGE UP (ref 32–36)
MCV RBC AUTO: 97.3 FL — SIGNIFICANT CHANGE UP (ref 80–100)
NRBC # BLD AUTO: 0 K/UL — SIGNIFICANT CHANGE UP (ref 0–0)
NRBC # FLD: 0 K/UL — SIGNIFICANT CHANGE UP (ref 0–0)
NRBC BLD AUTO-RTO: 0 /100 WBCS — SIGNIFICANT CHANGE UP (ref 0–0)
PLATELET # BLD AUTO: 102 K/UL — LOW (ref 150–400)
PMV BLD: 10.4 FL — SIGNIFICANT CHANGE UP (ref 7–13)
POTASSIUM SERPL-MCNC: 4.2 MMOL/L — SIGNIFICANT CHANGE UP (ref 3.5–5.3)
POTASSIUM SERPL-SCNC: 4.2 MMOL/L — SIGNIFICANT CHANGE UP (ref 3.5–5.3)
RBC # BLD: 2.99 M/UL — LOW (ref 4.2–5.8)
RBC # FLD: 22.7 % — HIGH (ref 10.3–14.5)
SODIUM SERPL-SCNC: 138 MMOL/L — SIGNIFICANT CHANGE UP (ref 135–145)
WBC # BLD: 8.79 K/UL — SIGNIFICANT CHANGE UP (ref 3.8–10.5)
WBC # FLD AUTO: 8.79 K/UL — SIGNIFICANT CHANGE UP (ref 3.8–10.5)

## 2025-05-01 PROCEDURE — 71275 CT ANGIOGRAPHY CHEST: CPT | Mod: 26

## 2025-05-01 PROCEDURE — 99233 SBSQ HOSP IP/OBS HIGH 50: CPT

## 2025-05-01 RX ORDER — CEFEPIME 2 G/20ML
2000 INJECTION, POWDER, FOR SOLUTION INTRAVENOUS EVERY 12 HOURS
Refills: 0 | Status: DISCONTINUED | OUTPATIENT
Start: 2025-05-01 | End: 2025-05-01

## 2025-05-01 RX ORDER — CEFEPIME 2 G/20ML
2000 INJECTION, POWDER, FOR SOLUTION INTRAVENOUS EVERY 12 HOURS
Refills: 0 | Status: DISCONTINUED | OUTPATIENT
Start: 2025-05-01 | End: 2025-05-02

## 2025-05-01 RX ORDER — FUROSEMIDE 10 MG/ML
20 INJECTION INTRAMUSCULAR; INTRAVENOUS ONCE
Refills: 0 | Status: COMPLETED | OUTPATIENT
Start: 2025-05-01 | End: 2025-05-01

## 2025-05-01 RX ADMIN — CEFEPIME 2000 MILLIGRAM(S): 2 INJECTION, POWDER, FOR SOLUTION INTRAVENOUS at 16:11

## 2025-05-01 RX ADMIN — MONTELUKAST SODIUM 10 MILLIGRAM(S): 10 TABLET ORAL at 21:00

## 2025-05-01 RX ADMIN — Medication 1 DOSE(S): at 20:26

## 2025-05-01 RX ADMIN — Medication 40 MILLIGRAM(S): at 09:50

## 2025-05-01 RX ADMIN — Medication 500000 UNIT(S): at 20:59

## 2025-05-01 RX ADMIN — Medication 1 DOSE(S): at 09:14

## 2025-05-01 RX ADMIN — Medication 650 MILLIGRAM(S): at 19:24

## 2025-05-01 RX ADMIN — Medication 500000 UNIT(S): at 13:32

## 2025-05-01 RX ADMIN — FUROSEMIDE 20 MILLIGRAM(S): 10 INJECTION INTRAMUSCULAR; INTRAVENOUS at 16:10

## 2025-05-01 RX ADMIN — FUROSEMIDE 40 MILLIGRAM(S): 10 INJECTION INTRAMUSCULAR; INTRAVENOUS at 09:50

## 2025-05-01 RX ADMIN — Medication 500000 UNIT(S): at 05:37

## 2025-05-01 RX ADMIN — LACTULOSE 20 GRAM(S): 10 SOLUTION ORAL at 09:50

## 2025-05-01 RX ADMIN — CEFEPIME 2000 MILLIGRAM(S): 2 INJECTION, POWDER, FOR SOLUTION INTRAVENOUS at 21:00

## 2025-05-01 RX ADMIN — Medication 50 MILLIGRAM(S): at 09:50

## 2025-05-01 RX ADMIN — Medication 650 MILLIGRAM(S): at 20:00

## 2025-05-01 NOTE — PROGRESS NOTE ADULT - ASSESSMENT
80yo male with PMHx of recent diagnosis of A-fib (not on AC), HTN, liver cirrhosis (dx March 2022 from agent orange?) hepatic encephalopathy, hernia x3, recent admitted April 3rd- 9th, 2025 for UGIB 2/2 esophageal varies s/p banding and COVID, now brought in by EMS to the ED with daughter c/o "vomiting blood" (last episode was 7:00-8:00am this morning) and rectal bleeding since last night after eating pepperoni during dinner. Daughter states that patient started vomiting and having maroon stools around 4am this morning, multiple episodes.     In ED /70s, HR 90s. pt had dark red BM in ED and feeling nauseous but no episodes vomiting. states he feels lightheaded with some abd pain in LLQ. CT A/P showing no active bleed but blood in colon, admitted to CCU for close monitoring and further management, s/p 4u pRBC, 1FFP, 1plt during hospital course thus far, EGD 4/26 w/ esophagitis but no bleeding. Colonoscopy 4/28 negative. Of note PPM interrogated during this admission showing multiple episodes of VT/VF. Patient was seen by cardiology and EP recommended switching coreg to propanolol 40 mg q8 hours. TTE showed EF 60-65% with left atrium severely dilated mild LVH. Patient medically stable for downgrade form CCU.     #Cirrhosis   #Esophageal bleeding   #Hepatic encephalopathy   #Ascites  Unclear cause of cirrhosis agent orange vs etoh use?   - s/p octreotide gtt   - s/p 4u pRBC, 1FFP, 1plt   - EGD 4/26 w/ esophagitis but no bleeding  - colonoscopy done 4/28 which was negative   - consider video capsule endoscopy outpatient   - GI consulted, appreciate recs   - cw protonix 40 mg daily   - advanced diet to regular   - trend H&H daily   - trend hepatic function daily   - maintain active type and screen   - resume home lasix 40 mg daily/adlactone 50 mg daily   - resume home lactulose 20 g daily rifaximin 550 mg bid for PSE     #chronic Afib   #AS s/p TAVR  #PPM   #Multiple episode VT/VF   - not on AC for afib given GI bleed   - Echo this admission: EF 60 to 65 %. Left atrium is severely dilated.Mild left ventricular hypertrophy. A a transcatheter deployed (TAVR) is present in the aortic positionThe peak transaortic velocity is 2.59 m/s, peak transaortic gradient is 26.8 mmHg and mean transaortic gradient is 14.0 mmHg with an LVOT/aortic valve VTI ratio of 0.42.  - EP consulted appreciate recs  - Cardiology consulted appreciate recs   - pt not candidiate for AAD with amio or mexiletine given cirrhosis   - switch coreg to propanolol 40 mg po 18 hrs   - maintain K>4, Mag >2     #CKD   #BRENNAN on CKD  Patient with CKD baseline cr ~1.6 developed brennan likely prerenal 2/2 to gi bleed cr peaked at 2.04  - trend bmp function daily     #Acute hypoxia likely multifactorial, new PNA, pleural effusions, pHTN  hypoxic yesterday while ambulating with pt   CXR yesterday clear   no clinical symptoms   SpO2 87% on ambulation on RA  -D-dimer elevated - pt hospitalized, recent covid-19, CT angio neg for PE  -CT with new Bilateral pulmonary nodular opacities measuring up to 1.5 cm, increased   from 4/26/2025 and new from 4/6/2025, and likely infectious in etiology  -Increased small b/l pleural effusions  -will give additional 20mg IV lasix   -will need home O2   -Also PSAE is 77   -pt has been on ceftriaxone -- d/c 5/29  -start cefepime, ID consult         DISPO: d/c planning for tomorrow, will need home O2

## 2025-05-01 NOTE — PROGRESS NOTE ADULT - SUBJECTIVE AND OBJECTIVE BOX
HOSPITALIST ATTENDING PROGRESS NOTE    Chart and meds reviewed.  Patient seen and examined.    Subjective: Patient seen this AM, doing well, has no complaints, breathing well on RA, SpO2 on ambulation on RA was 87%    All other systems reviewed and found to be negative with the exception of what has been described above.      MEDICATIONS  (STANDING):  cefepime  Injectable. 2000 milliGRAM(s) IV Push every 12 hours  chlorhexidine 2% Cloths 1 Application(s) Topical <User Schedule>  fluticasone propionate/ salmeterol 500-50 MICROgram(s) Diskus 1 Dose(s) Inhalation two times a day  furosemide    Tablet 40 milliGRAM(s) Oral daily  lactulose Syrup 20 Gram(s) Oral daily  montelukast 10 milliGRAM(s) Oral at bedtime  nystatin    Suspension 861244 Unit(s) Swish and Swallow four times a day  pantoprazole  Injectable 40 milliGRAM(s) IV Push daily  propranolol 40 milliGRAM(s) Oral every 8 hours  rifAXIMin 550 milliGRAM(s) Oral two times a day  spironolactone 50 milliGRAM(s) Oral daily    MEDICATIONS  (PRN):  acetaminophen     Tablet .. 650 milliGRAM(s) Oral every 6 hours PRN Moderate Pain (4 - 6)      Vital Signs Last 24 Hrs  T(C): 37 (01 May 2025 15:22), Max: 37.1 (01 May 2025 12:44)  T(F): 98.6 (01 May 2025 15:22), Max: 98.8 (01 May 2025 12:44)  HR: 91 (01 May 2025 16:00) (61 - 96)  BP: 120/89 (01 May 2025 16:00) (110/68 - 134/68)  BP(mean): 98 (01 May 2025 16:00) (76 - 98)  RR: 21 (01 May 2025 16:00) (14 - 24)  SpO2: 95% (01 May 2025 16:00) (89% - 97%)    Parameters below as of 30 Apr 2025 20:17  Patient On (Oxygen Delivery Method): nasal cannula          LABS:                          9.3    8.79  )-----------( 102      ( 01 May 2025 05:25 )             29.1     01 May 2025 05:25    138    |  109    |  33     ----------------------------<  128    4.2     |  23     |  1.04     Ca    8.8        01 May 2025 05:25  Phos  2.9       30 Apr 2025 05:56  Mg     1.7       30 Apr 2025 05:56    TPro  5.7    /  Alb  2.6    /  TBili  1.3    /  DBili  x      /  AST  30     /  ALT  17     /  AlkPhos  101    30 Apr 2025 05:56    LIVER FUNCTIONS - ( 30 Apr 2025 05:56 )  Alb: 2.6 g/dL / Pro: 5.7 gm/dL / ALK PHOS: 101 U/L / ALT: 17 U/L / AST: 30 U/L / GGT: x             CAPILLARY BLOOD GLUCOSE            RADIOLOGY:    < from: CT Angio Chest PE Protocol w/ IV Cont (05.01.25 @ 14:20) >  IMPRESSION:  No pulmonary embolism.    Bilateral pulmonary nodular opacities measuring up to 1.5 cm, increased   from 4/26/2025 and new from 4/6/2025, and likely infectious in etiology.    Increased small bilateral pleural effusions.

## 2025-05-01 NOTE — PROGRESS NOTE ADULT - TIME BILLING
I spent a total of 50 minutes on the date of this encounter coordinating the patient's care. This includes reviewing prior documentation, results and imaging in addition to completing a full history and physical examination on the patient. Further tests, medications, and procedures have been ordered as indicated. Laboratory results and the plan of care were communicated to the patient and/or their family member. Supporting documentation was completed and added to the patient's chart.
.
Patient seen labs reviewed discussed with staff on multidiscipianry rounds
.

## 2025-05-01 NOTE — DISCHARGE NOTE PROVIDER - HOSPITAL COURSE
HPI and hospital course:   78yo male with PMHx of recent diagnosis of A-fib (not on AC), HTN, liver cirrhosis (dx March 2022 from agent orange?) hepatic encephalopathy, hernia x3, recent admitted April 3rd- 9th, 2025 for UGIB 2/2 esophageal varies s/p banding and COVID, now brought in by EMS to the ED with daughter c/o "vomiting blood" (last episode was 7:00-8:00am this morning) and rectal bleeding since last night after eating pepperoni during dinner. Daughter states that patient started vomiting and having maroon stools around 4am this morning, multiple episodes.     In ED /70s, HR 90s. pt had dark red BM in ED and feeling nauseous but no episodes vomiting. states he feels lightheaded with some abd pain in LLQ. CT A/P showing no active bleed but blood in colon, admitted to CCU for close monitoring and further management, s/p 4u pRBC, 1FFP, 1plt during hospital course thus far, EGD 4/26 w/ esophagitis but no bleeding. Colonoscopy 4/28 negative. Of note PPM interrogated during this admission showing multiple episodes of VT/VF. Patient was seen by cardiology and EP recommended switching coreg to propanolol 40 mg q8 hours. TTE showed EF 60-65% with left atrium severely dilated mild LVH. Patient medically stable for downgrade form CCU. HPI and hospital course:   80yo male with PMHx of recent diagnosis of A-fib (not on AC), HTN, liver cirrhosis (dx March 2022 from agent orange?) hepatic encephalopathy, hernia x3, recent admitted April 3rd- 9th, 2025 for UGIB 2/2 esophageal varies s/p banding and COVID, now brought in by EMS to the ED with daughter c/o "vomiting blood" (last episode was 7:00-8:00am this morning) and rectal bleeding since last night after eating pepperoni during dinner. Daughter states that patient started vomiting and having maroon stools around 4am this morning, multiple episodes. In ED /70s, HR 90s. pt had dark red BM in ED and feeling nauseous but no episodes vomiting. states he feels lightheaded with some abd pain in LLQ. CT A/P showing no active bleed but blood in colon, admitted to CCU for close monitoring and further management, s/p 4u pRBC, 1FFP, 1plt during hospital course thus far, EGD 4/26 w/ esophagitis but no bleeding. Colonoscopy 4/28 negative. Of note PPM interrogated during this admission showing multiple episodes of VT/VF. Patient was seen by cardiology and EP recommended switching coreg to propanolol 40 mg q8 hours. TTE showed EF 60-65% with left atrium severely dilated mild LVH. Patient medically stable for downgrade form CCU.     #Cirrhosis   #Esophageal bleeding   #Hepatic encephalopathy   #Ascites  Unclear cause of cirrhosis agent orange vs etoh use?   - s/p octreotide gtt   -s/p 4u pRBC, 1FFP, 1plt   -EGD 4/26 w/ esophagitis but no bleeding  - colonoscopy done 4/28 which was negative   - consider video capsule endoscopy outpatient   - GI consulted, appreciate recs   - cw protonix 40 mg daily   - advanced diet to regular   - trend H&H daily   - trend hepatic function daily   - maintain active type and screen   - resume home lasix 40 mg daily/adlactone 50 mg daily   - resume home lactulose 20 g daily rifaximin 550 mg bid for PSE     #Afib   #AS s/p TAVR  #PPM   #Multiple episode VT/VF   - not on AC for afib given GI bleed   - Echo this admission: EF 60 to 65 %. Left atrium is severely dilated.Mild left ventricular hypertrophy. A a transcatheter deployed (TAVR) is present in the aortic positionThe peak transaortic velocity is 2.59 m/s, peak transaortic gradient is 26.8 mmHg and mean transaortic gradient is 14.0 mmHg with an LVOT/aortic valve VTI ratio of 0.42.  - EP consulted appreciate recs  - Cardiology consulted appreciate recs   - pt not candidiate for AAD with amio or mexiletine given cirrhosis   - switch coreg to propanolol 40 mg po 18 hrs   - maintain K>4, Mag >2     #CKD   #BRENNAN on CKD  Patient with CKD baseline cr ~1.6 developed brennan likely prerenal 2/2 to gi bleed cr peaked at 2.04  - trend bmp function daily     #Acute hypoxemia?   Pt hypoxic today while ambulating with pt   - f/u cxr   - no clinical symptoms   - may need oxygen eval tomorrow      HPI and hospital course:   80yo male with PMHx of recent diagnosis of A-fib (not on AC), HTN, liver cirrhosis (dx March 2022 from agent orange?) hepatic encephalopathy, hernia x3, recent admitted April 3rd- 9th, 2025 for UGIB 2/2 esophageal varies s/p banding and COVID, now brought in by EMS to the ED with daughter c/o "vomiting blood" (last episode was 7:00-8:00am this morning) and rectal bleeding since last night after eating pepperoni during dinner. Daughter states that patient started vomiting and having maroon stools around 4am this morning, multiple episodes. In ED /70s, HR 90s. pt had dark red BM in ED and feeling nauseous but no episodes vomiting. states he feels lightheaded with some abd pain in LLQ. CT A/P showing no active bleed but blood in colon, admitted to CCU for close monitoring and further management, s/p 4u pRBC, 1FFP, 1plt during hospital course thus far, EGD 4/26 w/ esophagitis but no bleeding. Colonoscopy 4/28 negative. Of note PPM interrogated during this admission showing multiple episodes of VT/VF. Patient was seen by cardiology and EP recommended switching coreg to propanolol 40 mg q8 hours. TTE showed EF 60-65% with left atrium severely dilated mild LVH. Patient medically stable for downgrade form CCU.     #Cirrhosis   #Esophageal bleeding   #Hepatic encephalopathy   #Ascites  Unclear cause of cirrhosis agent orange vs etoh use?   - s/p octreotide gtt   - s/p 4u pRBC, 1FFP, 1plt   - EGD 4/26 w/ esophagitis but no bleeding  - colonoscopy done 4/28 which was negative   - consider video capsule endoscopy outpatient   - GI consulted, appreciate recs   - cw protonix 40 mg daily   - advanced diet to regular   - trend H&H daily   - trend hepatic function daily   - maintain active type and screen   - resume home lasix 40 mg daily/adlactone 50 mg daily   - resume home lactulose 20 g daily rifaximin 550 mg bid for PSE     #chronic Afib   #AS s/p TAVR  #PPM   #Multiple episode VT/VF   - not on AC for afib given GI bleed   - Echo this admission: EF 60 to 65 %. Left atrium is severely dilated.Mild left ventricular hypertrophy. A a transcatheter deployed (TAVR) is present in the aortic positionThe peak transaortic velocity is 2.59 m/s, peak transaortic gradient is 26.8 mmHg and mean transaortic gradient is 14.0 mmHg with an LVOT/aortic valve VTI ratio of 0.42.  - EP consulted appreciate recs  - Cardiology consulted appreciate recs   - pt not candidiate for AAD with amio or mexiletine given cirrhosis   - switch coreg to propanolol 40 mg po 18 hrs   - maintain K>4, Mag >2     #CKD   #BRENNAN on CKD  Patient with CKD baseline cr ~1.6 developed brennan likely prerenal 2/2 to gi bleed cr peaked at 2.04  - trend bmp function daily     #Acute hypoxia likely multifactorial, new PNA, suspected GNR PNA, pleural effusions, pHTN  hypoxic yesterday while ambulating with pt   CXR yesterday clear   no clinical symptoms   SpO2 87% on ambulation on RA  -D-dimer elevated - pt hospitalized, recent covid-19, CT angio neg for PE  -CT with new Bilateral pulmonary nodular opacities measuring up to 1.5 cm, increased   from 4/26/2025 and new from 4/6/2025, and likely infectious in etiology  -Increased small b/l pleural effusions  -will give additional 20mg IV lasix   -will need home O2   -Also PSAE is 77   -pt has been on ceftriaxone -- d/c 5/29  -started cefepime, ID consult   -will d/c on vantin + doxy 7 days                 HPI and hospital course:   80yo male with PMHx of recent diagnosis of A-fib (not on AC), HTN, liver cirrhosis (dx March 2022 from agent orange?) hepatic encephalopathy, hernia x3, recent admitted April 3rd- 9th, 2025 for UGIB 2/2 esophageal varies s/p banding and COVID, now brought in by EMS to the ED with daughter c/o "vomiting blood" (last episode was 7:00-8:00am this morning) and rectal bleeding since last night after eating pepperoni during dinner. Daughter states that patient started vomiting and having maroon stools around 4am this morning, multiple episodes. In ED /70s, HR 90s. pt had dark red BM in ED and feeling nauseous but no episodes vomiting. states he feels lightheaded with some abd pain in LLQ. CT A/P showing no active bleed but blood in colon, admitted to CCU for close monitoring and further management, s/p 4u pRBC, 1FFP, 1plt during hospital course thus far, EGD 4/26 w/ esophagitis but no bleeding. Colonoscopy 4/28 negative. Of note PPM interrogated during this admission showing multiple episodes of VT/VF. Patient was seen by cardiology and EP recommended switching coreg to propanolol 40 mg q8 hours. TTE showed EF 60-65% with left atrium severely dilated mild LVH. Patient medically stable for downgrade form CCU.     #Cirrhosis   #Esophageal bleeding   #Hepatic encephalopathy   #Ascites  Unclear cause of cirrhosis agent orange vs etoh use?   - s/p octreotide gtt   - s/p 4u pRBC, 1FFP, 1plt   - EGD 4/26 w/ esophagitis but no bleeding  - colonoscopy done 4/28 which was negative   - consider video capsule endoscopy outpatient   - GI consulted, appreciate recs   - cw protonix 40 mg daily   - advanced diet to regular   - trend H&H daily   - trend hepatic function daily   - maintain active type and screen   - resume home lasix 40 mg daily/adlactone 50 mg daily   - resume home lactulose 20 g daily rifaximin 550 mg bid for PSE     #chronic Afib   #AS s/p TAVR  #PPM   #Multiple episode VT/VF   - not on AC for afib given GI bleed   - Echo this admission: EF 60 to 65 %. Left atrium is severely dilated.Mild left ventricular hypertrophy. A a transcatheter deployed (TAVR) is present in the aortic positionThe peak transaortic velocity is 2.59 m/s, peak transaortic gradient is 26.8 mmHg and mean transaortic gradient is 14.0 mmHg with an LVOT/aortic valve VTI ratio of 0.42.  - EP consulted appreciate recs  - Cardiology consulted appreciate recs   - pt not candidiate for AAD with amio or mexiletine given cirrhosis   - switch coreg to propanolol 40 mg po 18 hrs   - maintain K>4, Mag >2     #CKD   #BRENNAN on CKD  Patient with CKD baseline cr ~1.6 developed brennan likely prerenal 2/2 to gi bleed cr peaked at 2.04  - trend bmp function daily     #Acute hypoxia likely multifactorial, new PNA, suspected GNR PNA, pleural effusions, pHTN  hypoxic yesterday while ambulating with pt   CXR yesterday clear   no clinical symptoms   SpO2 87% on ambulation on RA  -D-dimer elevated - pt hospitalized, recent covid-19, CT angio neg for PE  -CT with new Bilateral pulmonary nodular opacities measuring up to 1.5 cm, increased   from 4/26/2025 and new from 4/6/2025, and likely infectious in etiology  -Increased small b/l pleural effusions  -will give additional 20mg IV lasix   -will need home O2   -Also PSAE is 77   -pt has been on ceftriaxone -- d/c 5/29  -started cefepime, ID consult   -will d/c on vantin + doxy 7 days      discharge plan d/w pt's daughter today, [facility] and will see his gastroenterologist today, [facility]ol [facility] and picked [facility]. Sent abx, daughter said the GI doc will fill them.

## 2025-05-01 NOTE — DISCHARGE NOTE PROVIDER - NSDCMRMEDTOKEN_GEN_ALL_CORE_FT
Advair Diskus 500 mcg-50 mcg/inh inhalation powder: 1 puff(s) inhaled 2 times a day  ascorbic acid 500 mg oral tablet: 1 tab(s) orally once a day (in the afternoon)  empagliflozin 25 mg oral tablet: 1 tab(s) orally once a day (in the morning)  folic acid 1 mg oral tablet: 1 tab(s) orally once a day (in the afternoon)  furosemide 40 mg oral tablet: 1 tab(s) orally once a day in the morning  loratadine 10 mg oral tablet: 1 tab(s) orally once a day (at bedtime)  magnesium oxide 400 mg oral tablet: 1 tab(s) orally once a day (at bedtime)  montelukast 10 mg oral tablet: 1 tab(s) orally once a day (at bedtime)  Multiple Vitamins with Minerals oral tablet, chewable: 1 tab(s) chewed once a day (in the morning)  ondansetron: prn as needed for  nausea  pantoprazole 40 mg oral delayed release tablet: 1 tab(s) orally 2 times a day  propranolol 40 mg oral tablet: 1 tab(s) orally every 8 hours  rifAXIMin 550 mg oral tablet: 1 tab(s) orally 2 times a day  spironolactone 50 mg oral tablet: 1 tab(s) orally once a day in the morning  Tylenol Extra Strength 500 mg oral tablet: 2 tab(s) orally 1 to 2 times a day as needed for  mild pain  Ventolin HFA 90 mcg/inh inhalation aerosol: 2 puff(s) inhaled every 4 to 6 hours as needed for  shortness of breath and/or wheezing  Vitamin B-12 1000 mcg oral tablet: 1 tab(s) orally once a day  Vitamin D3 1250 mcg (50,000 intl units) oral capsule: 1 cap(s) orally once a week on Sundays   Advair Diskus 500 mcg-50 mcg/inh inhalation powder: 1 puff(s) inhaled 2 times a day  ascorbic acid 500 mg oral tablet: 1 tab(s) orally once a day (in the afternoon)  cefpodoxime 200 mg oral tablet: 1 tab(s) orally 2 times a day  doxycycline hyclate 100 mg oral capsule: 1 cap(s) orally 2 times a day  empagliflozin 25 mg oral tablet: 1 tab(s) orally once a day (in the morning)  folic acid 1 mg oral tablet: 1 tab(s) orally once a day (in the afternoon)  furosemide 40 mg oral tablet: 1 tab(s) orally once a day in the morning  loratadine 10 mg oral tablet: 1 tab(s) orally once a day (at bedtime)  magnesium oxide 400 mg oral tablet: 1 tab(s) orally once a day (at bedtime)  montelukast 10 mg oral tablet: 1 tab(s) orally once a day (at bedtime)  Multiple Vitamins with Minerals oral tablet, chewable: 1 tab(s) chewed once a day (in the morning)  ondansetron: prn as needed for  nausea  pantoprazole 40 mg oral delayed release tablet: 1 tab(s) orally 2 times a day  propranolol 40 mg oral tablet: 1 tab(s) orally every 8 hours  rifAXIMin 550 mg oral tablet: 1 tab(s) orally 2 times a day  spironolactone 50 mg oral tablet: 1 tab(s) orally once a day in the morning  Tylenol Extra Strength 500 mg oral tablet: 2 tab(s) orally 1 to 2 times a day as needed for  mild pain  Ventolin HFA 90 mcg/inh inhalation aerosol: 2 puff(s) inhaled every 4 to 6 hours as needed for  shortness of breath and/or wheezing  Vitamin B-12 1000 mcg oral tablet: 1 tab(s) orally once a day  Vitamin D3 1250 mcg (50,000 intl units) oral capsule: 1 cap(s) orally once a week on Sundays

## 2025-05-01 NOTE — DISCHARGE NOTE PROVIDER - ATTENDING DISCHARGE PHYSICAL EXAMINATION:
Patient seen this morning, awake, alert, feels well, no overnight events    Vital Signs Last 24 Hrs  T(C): 36.1 (02 May 2025 06:17), Max: 37.1 (01 May 2025 12:44)  T(F): 97 (02 May 2025 06:17), Max: 98.8 (01 May 2025 12:44)  HR: 61 (02 May 2025 10:00) (60 - 96)  BP: 128/64 (02 May 2025 08:00) (105/53 - 133/74)  BP(mean): 83 (02 May 2025 08:00) (70 - 103)  RR: 21 (02 May 2025 08:00) (12 - 23)  SpO2: 93% (02 May 2025 10:00) (89% - 99%)    Parameters below as of 02 May 2025 04:00  Patient On (Oxygen Delivery Method): room air    PHYSICAL EXAM:  GENERAL: NAD, lying in bed comfortably  HEAD:  Atraumatic, Normocephalic  EYES: conjunctiva and sclera clear  ENT: Moist mucous membranes  NECK: Supple, No JVD  CHEST/LUNG: Clear to auscultation bilaterally; No rales, rhonchi, wheezing. Unlabored respirations  HEART: Regular rate and rhythm; No murmurs  ABDOMEN: Bowel sounds present; Soft, Nontender, Nondistended.   EXTREMITIES:  2+ Peripheral Pulses, brisk capillary refill. No clubbing, cyanosis, or edema  NERVOUS SYSTEM:  Alert & Oriented X3, speech clear. No deficits   MSK: FROM all 4 extremities, full and equal strength

## 2025-05-01 NOTE — DISCHARGE NOTE PROVIDER - NSDCCPCAREPLAN_GEN_ALL_CORE_FT
PRINCIPAL DISCHARGE DIAGNOSIS  Diagnosis: Anemia due to acute blood loss  Assessment and Plan of Treatment: Admitted for anemia due to gastrointestinal bleed, transfused 5 units of blood during hospitalization, endoscopy performed showing esophagitic, no active bleeding.      SECONDARY DISCHARGE DIAGNOSES  Diagnosis: Acute hypoxic respiratory failure  Assessment and Plan of Treatment: Found to have low oxygen levels, CT chest revealed new pneumonia, continue with vantin and doxycycline for 7 days.    Diagnosis: Pulmonary hypertension  Assessment and Plan of Treatment: Noted with pulmonary hypertension on echocardiogram, recommend consult with a pulmonologist for continued management.    Diagnosis: Hepatic encephalopathy  Assessment and Plan of Treatment: Resolved

## 2025-05-01 NOTE — PROGRESS NOTE ADULT - NUTRITIONAL ASSESSMENT
This patient has been assessed with a concern for Malnutrition and has been determined to have a diagnosis/diagnoses of Moderate protein-calorie malnutrition.    This patient is being managed with:   Diet Regular-  Entered: Apr 28 2025  2:53PM  

## 2025-05-01 NOTE — DISCHARGE NOTE PROVIDER - DETAILS OF MALNUTRITION DIAGNOSIS/DIAGNOSES
This patient has been assessed with a concern for Malnutrition and was treated during this hospitalization for the following Nutrition diagnosis/diagnoses:     -  04/28/2025: Moderate protein-calorie malnutrition

## 2025-05-01 NOTE — PROGRESS NOTE ADULT - PROVIDER SPECIALTY LIST ADULT
Critical Care
Electrophysiology
Gastroenterology
Hospitalist
Cardiology
Critical Care
Electrophysiology
Hospitalist
Hospitalist

## 2025-05-01 NOTE — DISCHARGE NOTE PROVIDER - NSDCCAREPROVSEEN_GEN_ALL_CORE_FT
Sunny, Lashon Mckinney, April Stroud, Dimple Hester, Arminda Love, Sheldon Avendano, Corby Nguyen, Rizwan Chau, Doug Lennon, Alberto Capps, Corby Dave, Conrado MUNGUIA

## 2025-05-01 NOTE — DISCHARGE NOTE NURSING/CASE MANAGEMENT/SOCIAL WORK - PATIENT PORTAL LINK FT
You can access the FollowMyHealth Patient Portal offered by [facility] by registering at the following website: [email or web address]. By joining [facility]’s FollowMyHealth portal, you will also be able to view your health information using other applications (apps) compatible with our system.

## 2025-05-01 NOTE — DISCHARGE NOTE NURSING/CASE MANAGEMENT/SOCIAL WORK - FINANCIAL ASSISTANCE
[facility] provides services at a reduced cost to those who are determined to be eligible through [facility]’s financial assistance program. Information regarding [facility]’s financial assistance program can be found by going to [email or web address] or by calling [phone].

## 2025-05-01 NOTE — DISCHARGE NOTE PROVIDER - NSDCHHCONTACT_GEN_ALL_CORE_FT
alone As certified below, I, or a nurse practitioner or physician assistant working with me, had a face-to-face encounter that meets the physician face-to-face encounter requirements.

## 2025-05-02 VITALS — TEMPERATURE: 97 F

## 2025-05-02 LAB
ANION GAP SERPL CALC-SCNC: 6 MMOL/L — SIGNIFICANT CHANGE UP (ref 5–17)
BASOPHILS # BLD AUTO: 0.02 K/UL — SIGNIFICANT CHANGE UP (ref 0–0.2)
BASOPHILS NFR BLD AUTO: 0.4 % — SIGNIFICANT CHANGE UP (ref 0–2)
BUN SERPL-MCNC: 32 MG/DL — HIGH (ref 7–23)
CALCIUM SERPL-MCNC: 9 MG/DL — SIGNIFICANT CHANGE UP (ref 8.5–10.1)
CHLORIDE SERPL-SCNC: 110 MMOL/L — HIGH (ref 96–108)
CO2 SERPL-SCNC: 23 MMOL/L — SIGNIFICANT CHANGE UP (ref 22–31)
CREAT SERPL-MCNC: 1.04 MG/DL — SIGNIFICANT CHANGE UP (ref 0.5–1.3)
EGFR: 73 ML/MIN/1.73M2 — SIGNIFICANT CHANGE UP
EGFR: 73 ML/MIN/1.73M2 — SIGNIFICANT CHANGE UP
EOSINOPHIL # BLD AUTO: 0.69 K/UL — HIGH (ref 0–0.5)
EOSINOPHIL NFR BLD AUTO: 12.3 % — HIGH (ref 0–6)
GLUCOSE SERPL-MCNC: 91 MG/DL — SIGNIFICANT CHANGE UP (ref 70–99)
HCT VFR BLD CALC: 28.5 % — LOW (ref 39–50)
HGB BLD-MCNC: 9.1 G/DL — LOW (ref 13–17)
IMM GRANULOCYTES # BLD AUTO: 0.04 K/UL — SIGNIFICANT CHANGE UP (ref 0–0.07)
IMM GRANULOCYTES NFR BLD AUTO: 0.7 % — SIGNIFICANT CHANGE UP (ref 0–0.9)
LYMPHOCYTES # BLD AUTO: 0.64 K/UL — LOW (ref 1–3.3)
LYMPHOCYTES NFR BLD AUTO: 11.4 % — LOW (ref 13–44)
MCHC RBC-ENTMCNC: 31.1 PG — SIGNIFICANT CHANGE UP (ref 27–34)
MCHC RBC-ENTMCNC: 31.9 G/DL — LOW (ref 32–36)
MCV RBC AUTO: 97.3 FL — SIGNIFICANT CHANGE UP (ref 80–100)
MONOCYTES # BLD AUTO: 0.73 K/UL — SIGNIFICANT CHANGE UP (ref 0–0.9)
MONOCYTES NFR BLD AUTO: 13.1 % — SIGNIFICANT CHANGE UP (ref 2–14)
NEUTROPHILS # BLD AUTO: 3.47 K/UL — SIGNIFICANT CHANGE UP (ref 1.8–7.4)
NEUTROPHILS NFR BLD AUTO: 62.1 % — SIGNIFICANT CHANGE UP (ref 43–77)
NRBC # BLD AUTO: 0 K/UL — SIGNIFICANT CHANGE UP (ref 0–0)
NRBC # FLD: 0 K/UL — SIGNIFICANT CHANGE UP (ref 0–0)
NRBC BLD AUTO-RTO: 0 /100 WBCS — SIGNIFICANT CHANGE UP (ref 0–0)
PLATELET # BLD AUTO: 107 K/UL — LOW (ref 150–400)
PMV BLD: 11.2 FL — SIGNIFICANT CHANGE UP (ref 7–13)
POTASSIUM SERPL-MCNC: 4 MMOL/L — SIGNIFICANT CHANGE UP (ref 3.5–5.3)
POTASSIUM SERPL-SCNC: 4 MMOL/L — SIGNIFICANT CHANGE UP (ref 3.5–5.3)
RBC # BLD: 2.93 M/UL — LOW (ref 4.2–5.8)
RBC # FLD: 22.6 % — HIGH (ref 10.3–14.5)
SODIUM SERPL-SCNC: 139 MMOL/L — SIGNIFICANT CHANGE UP (ref 135–145)
WBC # BLD: 5.59 K/UL — SIGNIFICANT CHANGE UP (ref 3.8–10.5)
WBC # FLD AUTO: 5.59 K/UL — SIGNIFICANT CHANGE UP (ref 3.8–10.5)

## 2025-05-02 PROCEDURE — 99239 HOSP IP/OBS DSCHRG MGMT >30: CPT

## 2025-05-02 RX ORDER — CEFPODOXIME PROXETIL 200 MG/1
1 TABLET, FILM COATED ORAL
Qty: 14 | Refills: 0
Start: 2025-05-02 | End: 2025-05-08

## 2025-05-02 RX ORDER — DOXYCYCLINE HYCLATE 100 MG
1 TABLET ORAL
Qty: 14 | Refills: 0
Start: 2025-05-02 | End: 2025-05-08

## 2025-05-02 RX ORDER — PROPRANOLOL HCL 60 MG
1 TABLET ORAL
Qty: 21 | Refills: 0
Start: 2025-05-02 | End: 2025-05-08

## 2025-05-02 RX ADMIN — Medication 1 DOSE(S): at 08:34

## 2025-05-02 RX ADMIN — Medication 1 APPLICATION(S): at 06:27

## 2025-05-02 RX ADMIN — Medication 500000 UNIT(S): at 06:25

## 2025-05-02 RX ADMIN — Medication 50 MILLIGRAM(S): at 10:21

## 2025-05-02 RX ADMIN — Medication 40 MILLIGRAM(S): at 10:21

## 2025-05-02 RX ADMIN — LACTULOSE 20 GRAM(S): 10 SOLUTION ORAL at 10:22

## 2025-05-02 RX ADMIN — Medication 500000 UNIT(S): at 11:42

## 2025-05-02 RX ADMIN — FUROSEMIDE 40 MILLIGRAM(S): 10 INJECTION INTRAMUSCULAR; INTRAVENOUS at 10:21

## 2025-05-02 RX ADMIN — CEFEPIME 2000 MILLIGRAM(S): 2 INJECTION, POWDER, FOR SOLUTION INTRAVENOUS at 10:22

## 2025-05-02 NOTE — CONSULT NOTE ADULT - SUBJECTIVE AND OBJECTIVE BOX
Patient is a 79y old  Male who presents with a chief complaint of GI bleed (01 May 2025 17:01)    HPI:  78yo male with PMHx of recent diagnosis of A-fib (not on AC), HTN, liver cirrhosis (dx 2022 from agent orange?) hepatic encephalopathy, hernia x3, recent admitted - 2025 for UGIB 2/2 esophageal varies s/p banding and COVID, now brought in by EMS to the ED with daughter c/o "vomiting blood" (last episode was 7:00-8:00am this morning) and rectal bleeding since last night after eating pepperoni during dinner. Daughter states that patient started vomiting and having maroon stools around 4am this morning, multiple episodes. In ED /70s, HR 90s. pt had dark red BM in ED and feeling nauseous but no episodes vomiting. states he feels lightheaded with some abd pain in LLQ. appears pale, imaging shows Bilateral pulmonary nodular opacities measuring up to 1.5 cm, increased from 2025 and new from 2025, and likely infectious in etiology. Increased small bilateral pleural effusions. Was on rocephin for possible pneumonia, noted to be hypoxic, abx changed to IV cefepime.       PMH: as above  PSH: as above    Meds: per reconciliation sheet, noted below  MEDICATIONS  (STANDING):  cefepime  Injectable. 2000 milliGRAM(s) IV Push every 12 hours  chlorhexidine 2% Cloths 1 Application(s) Topical <User Schedule>  fluticasone propionate/ salmeterol 500-50 MICROgram(s) Diskus 1 Dose(s) Inhalation two times a day  furosemide    Tablet 40 milliGRAM(s) Oral daily  lactulose Syrup 20 Gram(s) Oral daily  montelukast 10 milliGRAM(s) Oral at bedtime  nystatin    Suspension [id number] Unit(s) Swish and Swallow four times a day  pantoprazole  Injectable 40 milliGRAM(s) IV Push daily  propranolol 40 milliGRAM(s) Oral every 8 hours  rifAXIMin 550 milliGRAM(s) Oral two times a day  spironolactone 50 milliGRAM(s) Oral daily    Allergies    Mushrooms (Anaphylaxis (Severe))  penicillin (Rash)    Intolerances      Social: no smoking, no alcohol, no illegal drugs; no recent travel, no exposure to TB  FAMILY HISTORY:  FH: HTN (hypertension)       no history of premature cardiovascular disease in first degree relatives    ROS: the patient denies fever, no chills, no HA, no dizziness, no sore throat, no blurry vision, no CP, no palpitations, no SOB, no cough, no abdominal pain, no diarrhea, no N/V, no dysuria, no leg pain, no claudication, no rash, no joint aches, no rectal pain or bleeding, no night sweats    All other systems reviewed and are negative    Vital Signs Last 24 Hrs  T(C): 36.1 (02 May 2025 06:17), Max: 37.1 (01 May 2025 12:44)  T(F): 97 (02 May 2025 06:17), Max: 98.8 (01 May 2025 12:44)  HR: 64 (02 May 2025 08:00) (60 - 96)  BP: 128/64 (02 May 2025 08:00) (105/53 - 133/74)  BP(mean): 83 (02 May 2025 08:00) (70 - 103)  RR: 21 (02 May 2025 08:00) (12 - 23)  SpO2: 96% (02 May 2025 08:00) (89% - 97%)    Parameters below as of 02 May 2025 04:00  Patient On (Oxygen Delivery Method): room air      Daily     Daily Weight in k.1 (02 May 2025 06:17)    PE:  Constitutional: NAD  HEENT: NC/AT, EOMI, PERRLA, conjunctivae clear; ears and nose atraumatic; pharynx benign  Neck: supple; thyroid not palpable  Back: no tenderness  Respiratory: decreased breath sounds   Cardiovascular: S1S2 regular, no murmurs  Abdomen: soft, not tender, not distended, positive BS; liver and spleen WNL  Genitourinary: no suprapubic tenderness  Lymphatic: no LN palpable  Musculoskeletal: no muscle tenderness, no joint swelling or tenderness  Extremities: no pedal edema  Neurological/ Psychiatric: AxOx3, Judgement and insight normal;  moving all extremities  Skin: no rashes; no palpable lesions    Labs: all available labs reviewed                        9.1    5.59  )-----------( 107      ( 02 May 2025 05:28 )             28.5     05-02    139  |  110[H]  |  32[H]  ----------------------------<  91  4.0   |  23  |  1.04    Ca    9.0      02 May 2025 05:28       Urinalysis Basic - ( 02 May 2025 05:28 )    Color: x / Appearance: x / SG: x / pH: x  Gluc: 91 mg/dL / Ketone: x  / Bili: x / Urobili: x   Blood: x / Protein: x / Nitrite: x   Leuk Esterase: x / RBC: x / WBC x   Sq Epi: x / Non Sq Epi: x / Bacteria: x          Radiology: all available radiological tests reviewed    ACC: [id number] EXAM:  CT ANGIO CHEST PULM [facility]   ORDERED BY: JOVANY GALVEZ     PROCEDURE DATE:  2025          INTERPRETATION:  CLINICAL INFORMATION: Hypoxia. Recent GI bleed.    COMPARISON: CT abdomen and pelvis 2025, CT chest 2025.    CONTRAST/COMPLICATIONS:  IV Contrast: Omnipaque 350  70 cc administered   0 cc discarded  Oral Contrast: NONE  .    PROCEDURE:  CT Angiography of the Chest.  Sagittal and coronal reformats were performed as well as 3D (MIP)   reconstructions.    FINDINGS:    LUNGS AND LARGE AIRWAYS: Patent central airways. Biapical scarring.   Scattered bilateral nodular opacities, for example in the right upper   lobe measuring up to 1.5 cm (2-39), within the lingula measuring 1.4 cm   (2-83), and subcentimeter nodules within the left lower lobe, are   increased from 2025 and new from 2025. Additional scattered to   0.2 cm pulmonary nodules are unchanged. Partial atelectasis of the   bilateral lower lobes.  PLEURA: Small bilateral pleural effusions are increased from prior.  VESSELS: No pulmonary embolism. Calcified plaque within the thoracic   aorta and coronary arteries.  HEART: Pacemaker lead within the right atrium and ventricle. TAVR. Mitral   annular calcifications. Heart size is enlarged. No pericardial effusion.  MEDIASTINUM AND JUAN: No lymphadenopathy.  CHEST WALL AND LOWER NECK: Left chest wall cardiac device. Peripherally   calcified hypodense left thyroid lobe nodule. Bilateral gynecomastia.  VISUALIZED UPPER ABDOMEN: Cirrhosis. Accessory left hepatic artery from   the left gastric artery. Cholelithiasis versus sludge. Nodule adjacent to   the gastric fundus measures 1.3 cm, unchanged. Partially imaged abdominal   ascites.  BONES: Degenerative changes.    IMPRESSION:  No pulmonary embolism.    Bilateral pulmonary nodular opacities measuring up to 1.5 cm, increased   from 2025 and new from 2025, and likely infectious in etiology.    Increased small bilateral pleural effusions.        Advanced directives addressed: full resuscitation

## 2025-05-02 NOTE — CONSULT NOTE ADULT - ASSESSMENT
80yo with etoh cirrhosis presenting with gi bleed    given reports of hematemesis and known varices, concerned for rapid upper gi bleed  plan iv pantoprazole and octreotide  resuscitation with prbc  FFP    urgent EGD
78yo male with PMHx of recent diagnosis of A-fib (not on AC), HTN, liver cirrhosis (dx March 2022 from agent orange?) hepatic encephalopathy, hernia x3, recent admitted April 3rd- 9th, 2025 for UGIB 2/2 esophageal varies s/p banding and COVID, now brought in by EMS to the ED with daughter c/o "vomiting blood" (last episode was 7:00-8:00am this morning) and rectal bleeding since last night after eating pepperoni during dinner. Daughter states that patient started vomiting and having maroon stools around 4am this morning, multiple episodes. In ED /70s, HR 90s. pt had dark red BM in ED and feeling nauseous but no episodes vomiting. states he feels lightheaded with some abd pain in LLQ. appears pale, imaging shows Bilateral pulmonary nodular opacities measuring up to 1.5 cm, increased from 4/26/2025 and new from 4/6/2025, and likely infectious in etiology. Increased small bilateral pleural effusions. Was on rocephin for possible pneumonia, noted to be hypoxic, abx changed to IV cefepime.     Acute respiratory failure. Multifocal pneumonia. Pleural effusions. Cirrhosis with ascites. Hepatic encephalopathy  - imaging reviewed  - s/p rocephin x 1   - started on cefepime [id number] #2  - continue with antibiotic coverage  - on dc po vantin/doxy x 7 day course  - gi follow up noted   - monitor temps  - tolerating abx well so far; no side effects noted  - reason for abx use and side effects reviewed with patient  - supportive care  - fu cbc    Concern for infection with multi-resistant organisms was raised. Prior cultures reviewed. An epidemiologic assessment was performed. There is a significant risk for resistant microorganisms to spread to family members, and/or healthcare staff. The patient will be placed on isolation according to infection control policy. Will reconsider isolation measures based on new culture results and other clinical data as appropriate. Appropriate cultures collected and an appropriate broad spectrum antibiotic therapy will be considered.    IV to PO token is not indicated        
  80 yo male with a hx Afib not on AC, AS s/p TAVR 2/2025 and PPM at [facility], HTN, cirrhosis, hepatic encephalopathy, recent admission from 4/3-4/9 for UGIB due to esophageal varices brought in to ED with c/o vomiting blood.       Ventricular tachycardia  several episodes on device interrogation  seen by EP and started on propranolol - use of AAD limited by cirrhosis   per daughter pt had cath prior to TAVR in 2/2025- requesting records  no chest pain, Hs trop negative. if patient had unremarkable recent cardiac cath will cont to treat medically       Atrial fibrillation   not on AC in setting recurrent GI bleed   controlled on beta blocker     AS s/p TAVR   normal function on TTE     
ASSESSMENT & PLAN: 79 year old male with above history, admitted with UGIB noted to have episodes of VT/VF in March.        Patient should have cardiology/ischemia eval  Patient is not a candidate for AAD with Amiodarone or Mexiletine given cirrhosis  Resume beta blocker as BP tolerates, further recs on Propranolol vs Coreg pending results of echo  Check Echo  Maintain K>4, Mag >2  Further recs pending results of echo, cardiology eval  Management per CCU and GI  Plan discussed with CCU team and Dr. Beckman

## 2025-05-08 DIAGNOSIS — E44.0 MODERATE PROTEIN-CALORIE MALNUTRITION: ICD-10-CM

## 2025-05-08 DIAGNOSIS — D50.9 IRON DEFICIENCY ANEMIA, UNSPECIFIED: ICD-10-CM

## 2025-05-08 DIAGNOSIS — I12.9 HYPERTENSIVE CHRONIC KIDNEY DISEASE WITH STAGE 1 THROUGH STAGE 4 CHRONIC KIDNEY DISEASE, OR UNSPECIFIED CHRONIC KIDNEY DISEASE: ICD-10-CM

## 2025-05-08 DIAGNOSIS — D62 ACUTE POSTHEMORRHAGIC ANEMIA: ICD-10-CM

## 2025-05-08 DIAGNOSIS — K74.60 UNSPECIFIED CIRRHOSIS OF LIVER: ICD-10-CM

## 2025-05-08 DIAGNOSIS — N17.9 ACUTE KIDNEY FAILURE, UNSPECIFIED: ICD-10-CM

## 2025-05-08 DIAGNOSIS — B37.81 CANDIDAL ESOPHAGITIS: ICD-10-CM

## 2025-05-08 DIAGNOSIS — K76.82 HEPATIC ENCEPHALOPATHY: ICD-10-CM

## 2025-05-08 DIAGNOSIS — J15.69 PNEUMONIA DUE TO OTHER GRAM-NEGATIVE BACTERIA: ICD-10-CM

## 2025-05-08 DIAGNOSIS — K62.5 HEMORRHAGE OF ANUS AND RECTUM: ICD-10-CM

## 2025-05-08 DIAGNOSIS — I47.20 VENTRICULAR TACHYCARDIA, UNSPECIFIED: ICD-10-CM

## 2025-05-08 DIAGNOSIS — R91.8 OTHER NONSPECIFIC ABNORMAL FINDING OF LUNG FIELD: ICD-10-CM

## 2025-05-08 DIAGNOSIS — I85.01 ESOPHAGEAL VARICES WITH BLEEDING: ICD-10-CM

## 2025-05-08 DIAGNOSIS — I95.9 HYPOTENSION, UNSPECIFIED: ICD-10-CM

## 2025-05-08 DIAGNOSIS — J90 PLEURAL EFFUSION, NOT ELSEWHERE CLASSIFIED: ICD-10-CM

## 2025-05-08 DIAGNOSIS — J96.01 ACUTE RESPIRATORY FAILURE WITH HYPOXIA: ICD-10-CM

## 2025-05-08 DIAGNOSIS — Z91.018 ALLERGY TO OTHER FOODS: ICD-10-CM

## 2025-05-08 DIAGNOSIS — I51.7 CARDIOMEGALY: ICD-10-CM

## 2025-05-08 DIAGNOSIS — K57.30 DIVERTICULOSIS OF LARGE INTESTINE WITHOUT PERFORATION OR ABSCESS WITHOUT BLEEDING: ICD-10-CM

## 2025-05-08 DIAGNOSIS — Z88.0 ALLERGY STATUS TO PENICILLIN: ICD-10-CM

## 2025-05-08 DIAGNOSIS — I48.20 CHRONIC ATRIAL FIBRILLATION, UNSPECIFIED: ICD-10-CM

## 2025-05-08 DIAGNOSIS — I35.0 NONRHEUMATIC AORTIC (VALVE) STENOSIS: ICD-10-CM

## 2025-05-08 DIAGNOSIS — R18.8 OTHER ASCITES: ICD-10-CM

## 2025-05-08 DIAGNOSIS — I27.20 PULMONARY HYPERTENSION, UNSPECIFIED: ICD-10-CM
